# Patient Record
Sex: MALE | Race: WHITE | Employment: OTHER | URBAN - NONMETROPOLITAN AREA
[De-identification: names, ages, dates, MRNs, and addresses within clinical notes are randomized per-mention and may not be internally consistent; named-entity substitution may affect disease eponyms.]

---

## 2018-04-24 ENCOUNTER — HOSPITAL ENCOUNTER (OUTPATIENT)
Age: 83
Discharge: HOME | End: 2018-04-24
Payer: MEDICARE

## 2018-04-24 LAB
ABSOLUTE EOS #: 0.1 K/UL (ref 0–0.4)
ABSOLUTE IMMATURE GRANULOCYTE: NORMAL K/UL (ref 0–0.3)
ABSOLUTE LYMPH #: 1.5 K/UL (ref 1–4.8)
ABSOLUTE MONO #: 0.7 K/UL (ref 0–1)
ANION GAP SERPL CALCULATED.3IONS-SCNC: 13 MMOL/L (ref 9–17)
BASOPHILS # BLD: 0 % (ref 0–2)
BASOPHILS ABSOLUTE: 0 K/UL (ref 0–0.2)
BUN BLDV-MCNC: 26 MG/DL (ref 8–23)
BUN/CREAT BLD: 21 (ref 9–20)
CALCIUM SERPL-MCNC: 9.5 MG/DL (ref 8.6–10.4)
CHLORIDE BLD-SCNC: 100 MMOL/L (ref 98–107)
CO2: 30 MMOL/L (ref 20–31)
CREAT SERPL-MCNC: 1.24 MG/DL (ref 0.7–1.2)
DIFFERENTIAL TYPE: NORMAL
EOSINOPHILS RELATIVE PERCENT: 2 % (ref 0–5)
GFR AFRICAN AMERICAN: >60 ML/MIN
GFR NON-AFRICAN AMERICAN: 55 ML/MIN
GFR SERPL CREATININE-BSD FRML MDRD: ABNORMAL ML/MIN/{1.73_M2}
GFR SERPL CREATININE-BSD FRML MDRD: ABNORMAL ML/MIN/{1.73_M2}
GLUCOSE BLD-MCNC: 105 MG/DL (ref 70–99)
HCT VFR BLD CALC: 42.1 % (ref 41–53)
HEMOGLOBIN: 14 G/DL (ref 13.5–17.5)
IMMATURE GRANULOCYTES: NORMAL %
LYMPHOCYTES # BLD: 19 % (ref 13–44)
MCH RBC QN AUTO: 30.7 PG (ref 26–34)
MCHC RBC AUTO-ENTMCNC: 33.3 G/DL (ref 31–37)
MCV RBC AUTO: 92.3 FL (ref 80–100)
MONOCYTES # BLD: 9 % (ref 5–9)
NRBC AUTOMATED: NORMAL PER 100 WBC
PDW BLD-RTO: 14 % (ref 12.1–15.2)
PLATELET # BLD: 233 K/UL (ref 140–450)
PLATELET ESTIMATE: NORMAL
PMV BLD AUTO: NORMAL FL (ref 6–12)
POTASSIUM SERPL-SCNC: 3.7 MMOL/L (ref 3.7–5.3)
RBC # BLD: 4.56 M/UL (ref 4.5–5.9)
RBC # BLD: NORMAL 10*6/UL
SEG NEUTROPHILS: 70 % (ref 39–75)
SEGMENTED NEUTROPHILS ABSOLUTE COUNT: 5.6 K/UL (ref 2.1–6.5)
SODIUM BLD-SCNC: 143 MMOL/L (ref 135–144)
WBC # BLD: 8 K/UL (ref 3.5–11)
WBC # BLD: NORMAL 10*3/UL

## 2018-04-24 PROCEDURE — 36415 COLL VENOUS BLD VENIPUNCTURE: CPT

## 2018-04-24 PROCEDURE — 80048 BASIC METABOLIC PNL TOTAL CA: CPT

## 2018-04-24 PROCEDURE — 85025 COMPLETE CBC W/AUTO DIFF WBC: CPT

## 2018-06-18 ENCOUNTER — APPOINTMENT (OUTPATIENT)
Dept: GENERAL RADIOLOGY | Age: 83
End: 2018-06-18
Payer: MEDICARE

## 2018-06-18 ENCOUNTER — HOSPITAL ENCOUNTER (EMERGENCY)
Age: 83
Discharge: ANOTHER HOSPITAL | End: 2018-06-18
Attending: FAMILY MEDICINE
Payer: MEDICARE

## 2018-06-18 VITALS — DIASTOLIC BLOOD PRESSURE: 70 MMHG | OXYGEN SATURATION: 92 % | SYSTOLIC BLOOD PRESSURE: 114 MMHG

## 2018-06-18 DIAGNOSIS — L03.116 CELLULITIS OF LEFT FOOT: Primary | ICD-10-CM

## 2018-06-18 DIAGNOSIS — I99.8 ISCHEMIA OF FOOT: ICD-10-CM

## 2018-06-18 LAB
-: NORMAL
ABSOLUTE EOS #: 0.1 K/UL (ref 0–0.4)
ABSOLUTE IMMATURE GRANULOCYTE: ABNORMAL K/UL (ref 0–0.3)
ABSOLUTE LYMPH #: 0.9 K/UL (ref 1–4.8)
ABSOLUTE MONO #: 0.9 K/UL (ref 0–1)
ALBUMIN SERPL-MCNC: 3.2 G/DL (ref 3.5–5.2)
ALBUMIN/GLOBULIN RATIO: ABNORMAL (ref 1–2.5)
ALP BLD-CCNC: 85 U/L (ref 40–129)
ALT SERPL-CCNC: 18 U/L (ref 5–41)
AMORPHOUS: NORMAL
ANION GAP SERPL CALCULATED.3IONS-SCNC: 11 MMOL/L (ref 9–17)
AST SERPL-CCNC: 26 U/L
BACTERIA: NORMAL
BASOPHILS # BLD: 0 % (ref 0–2)
BASOPHILS ABSOLUTE: 0 K/UL (ref 0–0.2)
BILIRUB SERPL-MCNC: 0.34 MG/DL (ref 0.3–1.2)
BILIRUBIN DIRECT: <0.08 MG/DL
BILIRUBIN URINE: NEGATIVE
BILIRUBIN, INDIRECT: ABNORMAL MG/DL (ref 0–1)
BUN BLDV-MCNC: 51 MG/DL (ref 8–23)
BUN/CREAT BLD: 28 (ref 9–20)
CALCIUM SERPL-MCNC: 9.2 MG/DL (ref 8.6–10.4)
CASTS UA: NORMAL /LPF
CHLORIDE BLD-SCNC: 99 MMOL/L (ref 98–107)
CO2: 24 MMOL/L (ref 20–31)
COLOR: YELLOW
COMMENT UA: ABNORMAL
CREAT SERPL-MCNC: 1.79 MG/DL (ref 0.7–1.2)
CRYSTALS, UA: NORMAL /HPF
DIFFERENTIAL TYPE: YES
EKG ATRIAL RATE: 79 BPM
EKG P AXIS: 6 DEGREES
EKG P-R INTERVAL: 232 MS
EKG Q-T INTERVAL: 394 MS
EKG QRS DURATION: 58 MS
EKG QTC CALCULATION (BAZETT): 451 MS
EKG R AXIS: -33 DEGREES
EKG T AXIS: 33 DEGREES
EKG VENTRICULAR RATE: 79 BPM
EOSINOPHILS RELATIVE PERCENT: 2 % (ref 0–5)
EPITHELIAL CELLS UA: NORMAL /HPF
GFR AFRICAN AMERICAN: 44 ML/MIN
GFR NON-AFRICAN AMERICAN: 36 ML/MIN
GFR SERPL CREATININE-BSD FRML MDRD: ABNORMAL ML/MIN/{1.73_M2}
GFR SERPL CREATININE-BSD FRML MDRD: ABNORMAL ML/MIN/{1.73_M2}
GLOBULIN: ABNORMAL G/DL (ref 1.5–3.8)
GLUCOSE BLD-MCNC: 109 MG/DL (ref 70–99)
GLUCOSE URINE: NEGATIVE
HCT VFR BLD CALC: 40.1 % (ref 41–53)
HEMOGLOBIN: 13.3 G/DL (ref 13.5–17.5)
IMMATURE GRANULOCYTES: ABNORMAL %
KETONES, URINE: NEGATIVE
LACTIC ACID: 1.5 MMOL/L (ref 0.5–2.2)
LEUKOCYTE ESTERASE, URINE: ABNORMAL
LYMPHOCYTES # BLD: 12 % (ref 13–44)
MCH RBC QN AUTO: 30.2 PG (ref 26–34)
MCHC RBC AUTO-ENTMCNC: 33.2 G/DL (ref 31–37)
MCV RBC AUTO: 91.1 FL (ref 80–100)
MONOCYTES # BLD: 11 % (ref 5–9)
MUCUS: NORMAL
NITRITE, URINE: NEGATIVE
NRBC AUTOMATED: ABNORMAL PER 100 WBC
OTHER OBSERVATIONS UA: NORMAL
PDW BLD-RTO: 14 % (ref 12.1–15.2)
PH UA: 6 (ref 5–8)
PLATELET # BLD: 237 K/UL (ref 140–450)
PLATELET ESTIMATE: ABNORMAL
PMV BLD AUTO: ABNORMAL FL (ref 6–12)
POTASSIUM SERPL-SCNC: 5.3 MMOL/L (ref 3.7–5.3)
PROTEIN UA: ABNORMAL
RBC # BLD: 4.4 M/UL (ref 4.5–5.9)
RBC # BLD: ABNORMAL 10*6/UL
RBC UA: NORMAL /HPF (ref 0–2)
RENAL EPITHELIAL, UA: NORMAL /HPF
SEG NEUTROPHILS: 75 % (ref 39–75)
SEGMENTED NEUTROPHILS ABSOLUTE COUNT: 6.2 K/UL (ref 2.1–6.5)
SODIUM BLD-SCNC: 134 MMOL/L (ref 135–144)
SPECIFIC GRAVITY UA: 1.01 (ref 1–1.03)
TOTAL PROTEIN: 7.1 G/DL (ref 6.4–8.3)
TRICHOMONAS: NORMAL
TURBIDITY: CLEAR
URINE HGB: NEGATIVE
UROBILINOGEN, URINE: NORMAL
WBC # BLD: 8.2 K/UL (ref 3.5–11)
WBC # BLD: ABNORMAL 10*3/UL
WBC UA: NORMAL /HPF
YEAST: NORMAL

## 2018-06-18 PROCEDURE — 6370000000 HC RX 637 (ALT 250 FOR IP): Performed by: EMERGENCY MEDICINE

## 2018-06-18 PROCEDURE — 81001 URINALYSIS AUTO W/SCOPE: CPT

## 2018-06-18 PROCEDURE — 2580000003 HC RX 258: Performed by: FAMILY MEDICINE

## 2018-06-18 PROCEDURE — 96366 THER/PROPH/DIAG IV INF ADDON: CPT

## 2018-06-18 PROCEDURE — 80076 HEPATIC FUNCTION PANEL: CPT

## 2018-06-18 PROCEDURE — 73620 X-RAY EXAM OF FOOT: CPT

## 2018-06-18 PROCEDURE — 83605 ASSAY OF LACTIC ACID: CPT

## 2018-06-18 PROCEDURE — 96375 TX/PRO/DX INJ NEW DRUG ADDON: CPT

## 2018-06-18 PROCEDURE — 80048 BASIC METABOLIC PNL TOTAL CA: CPT

## 2018-06-18 PROCEDURE — 6360000002 HC RX W HCPCS: Performed by: FAMILY MEDICINE

## 2018-06-18 PROCEDURE — 85025 COMPLETE CBC W/AUTO DIFF WBC: CPT

## 2018-06-18 PROCEDURE — 86403 PARTICLE AGGLUT ANTBDY SCRN: CPT

## 2018-06-18 PROCEDURE — 99284 EMERGENCY DEPT VISIT MOD MDM: CPT

## 2018-06-18 PROCEDURE — 87205 SMEAR GRAM STAIN: CPT

## 2018-06-18 PROCEDURE — 96365 THER/PROPH/DIAG IV INF INIT: CPT

## 2018-06-18 PROCEDURE — 87070 CULTURE OTHR SPECIMN AEROBIC: CPT

## 2018-06-18 PROCEDURE — 87086 URINE CULTURE/COLONY COUNT: CPT

## 2018-06-18 PROCEDURE — 93005 ELECTROCARDIOGRAM TRACING: CPT

## 2018-06-18 RX ORDER — IBUPROFEN 200 MG
400 TABLET ORAL EVERY 6 HOURS PRN
COMMUNITY

## 2018-06-18 RX ORDER — HYDROMORPHONE HCL 110MG/55ML
1 PATIENT CONTROLLED ANALGESIA SYRINGE INTRAVENOUS ONCE
Status: COMPLETED | OUTPATIENT
Start: 2018-06-18 | End: 2018-06-18

## 2018-06-18 RX ORDER — IBUPROFEN 200 MG
400 TABLET ORAL ONCE
Status: COMPLETED | OUTPATIENT
Start: 2018-06-18 | End: 2018-06-18

## 2018-06-18 RX ORDER — ONDANSETRON 2 MG/ML
4 INJECTION INTRAMUSCULAR; INTRAVENOUS ONCE
Status: COMPLETED | OUTPATIENT
Start: 2018-06-18 | End: 2018-06-18

## 2018-06-18 RX ORDER — MORPHINE SULFATE 4 MG/ML
2 INJECTION, SOLUTION INTRAMUSCULAR; INTRAVENOUS ONCE
Status: COMPLETED | OUTPATIENT
Start: 2018-06-18 | End: 2018-06-18

## 2018-06-18 RX ORDER — MAGNESIUM 30 MG
30 TABLET ORAL DAILY
Status: ON HOLD | COMMUNITY
End: 2018-06-23 | Stop reason: CLARIF

## 2018-06-18 RX ADMIN — MORPHINE SULFATE 2 MG: 4 INJECTION INTRAVENOUS at 19:17

## 2018-06-18 RX ADMIN — PIPERACILLIN SODIUM,TAZOBACTAM SODIUM 3.38 G: 3; .375 INJECTION, POWDER, FOR SOLUTION INTRAVENOUS at 19:34

## 2018-06-18 RX ADMIN — IBUPROFEN 400 MG: 200 TABLET, FILM COATED ORAL at 21:47

## 2018-06-18 RX ADMIN — ONDANSETRON 4 MG: 2 INJECTION INTRAMUSCULAR; INTRAVENOUS at 19:34

## 2018-06-18 RX ADMIN — HYDROMORPHONE HYDROCHLORIDE 1 MG: 2 INJECTION INTRAMUSCULAR; INTRAVENOUS; SUBCUTANEOUS at 20:47

## 2018-06-18 ASSESSMENT — PAIN DESCRIPTION - PAIN TYPE
TYPE: ACUTE PAIN

## 2018-06-18 ASSESSMENT — PAIN SCALES - GENERAL
PAINLEVEL_OUTOF10: 8
PAINLEVEL_OUTOF10: 6
PAINLEVEL_OUTOF10: 10
PAINLEVEL_OUTOF10: 10
PAINLEVEL_OUTOF10: 8

## 2018-06-18 ASSESSMENT — PAIN DESCRIPTION - DIRECTION: RADIATING_TOWARDS: KNEE

## 2018-06-18 ASSESSMENT — PAIN DESCRIPTION - FREQUENCY
FREQUENCY: CONTINUOUS
FREQUENCY: CONTINUOUS

## 2018-06-18 ASSESSMENT — PAIN DESCRIPTION - LOCATION: LOCATION: FOOT;LEG

## 2018-06-18 ASSESSMENT — PAIN DESCRIPTION - ORIENTATION
ORIENTATION: LEFT

## 2018-06-18 ASSESSMENT — PAIN DESCRIPTION - DESCRIPTORS
DESCRIPTORS: BURNING
DESCRIPTORS: SHOOTING

## 2018-06-19 LAB
CULTURE: NORMAL
Lab: NORMAL
SPECIMEN DESCRIPTION: NORMAL
STATUS: NORMAL

## 2018-06-20 LAB
CULTURE: ABNORMAL
DIRECT EXAM: ABNORMAL
Lab: ABNORMAL
SPECIMEN DESCRIPTION: ABNORMAL
STATUS: ABNORMAL

## 2018-06-22 ENCOUNTER — HOSPITAL ENCOUNTER (INPATIENT)
Age: 83
LOS: 15 days | Discharge: HOME | DRG: 949 | End: 2018-07-07
Attending: SURGERY | Admitting: SURGERY
Payer: MEDICARE

## 2018-06-22 PROBLEM — Z98.62 S/P PERIPHERAL ARTERY ANGIOPLASTY: Status: ACTIVE | Noted: 2018-06-22

## 2018-06-22 PROBLEM — I99.8 ISCHEMIA OF EXTREMITY: Status: ACTIVE | Noted: 2018-06-22

## 2018-06-22 PROCEDURE — 6370000000 HC RX 637 (ALT 250 FOR IP): Performed by: SURGERY

## 2018-06-22 PROCEDURE — 1200000002 HC SEMI PRIVATE SWING BED

## 2018-06-22 RX ORDER — CIPROFLOXACIN 2 MG/ML
400 INJECTION, SOLUTION INTRAVENOUS EVERY 12 HOURS
Status: DISCONTINUED | OUTPATIENT
Start: 2018-06-22 | End: 2018-07-06

## 2018-06-22 RX ORDER — TAMSULOSIN HYDROCHLORIDE 0.4 MG/1
0.4 CAPSULE ORAL DAILY
Status: DISCONTINUED | OUTPATIENT
Start: 2018-06-23 | End: 2018-06-23

## 2018-06-22 RX ORDER — HYDROCODONE BITARTRATE AND ACETAMINOPHEN 5; 325 MG/1; MG/1
1 TABLET ORAL EVERY 6 HOURS PRN
Status: DISCONTINUED | OUTPATIENT
Start: 2018-06-22 | End: 2018-07-07 | Stop reason: HOSPADM

## 2018-06-22 RX ORDER — DOCUSATE SODIUM 100 MG/1
100 CAPSULE, LIQUID FILLED ORAL DAILY
Status: DISCONTINUED | OUTPATIENT
Start: 2018-06-23 | End: 2018-07-07 | Stop reason: HOSPADM

## 2018-06-22 RX ORDER — CLOPIDOGREL BISULFATE 75 MG/1
75 TABLET ORAL DAILY
Status: DISCONTINUED | OUTPATIENT
Start: 2018-06-23 | End: 2018-07-07 | Stop reason: HOSPADM

## 2018-06-22 RX ADMIN — HYDROCODONE BITARTRATE AND ACETAMINOPHEN 1 TABLET: 5; 325 TABLET ORAL at 23:15

## 2018-06-22 ASSESSMENT — PAIN DESCRIPTION - PROGRESSION: CLINICAL_PROGRESSION: NOT CHANGED

## 2018-06-22 ASSESSMENT — PAIN DESCRIPTION - ONSET: ONSET: ON-GOING

## 2018-06-22 ASSESSMENT — PAIN DESCRIPTION - FREQUENCY: FREQUENCY: INTERMITTENT

## 2018-06-22 ASSESSMENT — PAIN DESCRIPTION - DESCRIPTORS: DESCRIPTORS: BURNING;SHARP

## 2018-06-22 ASSESSMENT — PAIN SCALES - GENERAL
PAINLEVEL_OUTOF10: 5
PAINLEVEL_OUTOF10: 5

## 2018-06-22 ASSESSMENT — PAIN DESCRIPTION - ORIENTATION: ORIENTATION: LEFT;ANTERIOR;POSTERIOR

## 2018-06-22 ASSESSMENT — PAIN DESCRIPTION - LOCATION: LOCATION: LEG

## 2018-06-22 ASSESSMENT — PAIN DESCRIPTION - PAIN TYPE: TYPE: ACUTE PAIN

## 2018-06-22 NOTE — H&P
History and Physical      Santy Shearer is a 80 y.o. male  YOB: 1930      Chief complaint: left leg crisis of combined ischemia and polymicrobial  Infection . Present Illness: Patient presents with resting pain and swollen draining left lower extremity. He is sent to er here for arrangements for vascular intervention and soft tissue management. This is effected through services at [facility] vascular, infectious disease. Patient also in need of pt ot. Cultures return staph aureus and pseudomonas with multiple other gram negatives. Family History  Diabetes No  Heart Disease Yes  Tuberculosis No  Cancer No  Other:        Family History: cardiovascular diseas at an advanced age. Past History  Asthma No  Chronic Bronchitis Yes  Emphysema Yes  Tuberculosis No  Smokes Yes( pipe 70 years)  Head Injury No  Epilepsy No  Kidney Disease No  Liver Disease/Hepatitis No  Yellow Jaundice No  Diabetes No  Thyroid Disease No  Heart Disease Yes  High Blood Pressure Yes  Angina No  Rheumatic Fever No  Cortisone Rx No  Alcohol Excess No  Pregnancy No  Glaucoma No  Loose Teeth Yes and No  Tranquilizers No  Other: peripheral vascular disease. 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 Authorizing Provider   ibuprofen (ADVIL;MOTRIN) 200 MG tablet Take 400 mg by mouth every 6 hours as needed for Pain    Historical Provider, MD   magnesium 30 MG tablet Take 30 mg by mouth daily    Historical Provider, MD   POTASSIUM GLUCONATE PO Take by mouth    Historical Provider, MD   Albuterol Sulfate (PROAIR HFA IN) Inhale into the lungs 2 puffs every 6 hours as needed.     Historical Provider, MD   traMADol [place] [facility]) 50 MG tablet Take 50 mg by mouth every 6 hours as needed for Pain    Historical Provider, MD   hydrOXYzine (VISTARIL) 50 MG capsule Take 50 mg by mouth 3 times daily as needed for Itching    Historical Provider, MD   furosemide (LASIX) 20 MG tablet Take 20 mg by mouth 2 times daily     Historical Provider, MD   tamsulosin (FLOMAX) 0.4 MG capsule Take 0.4 mg by mouth 2 times daily    Historical Provider, MD   lisinopril (PRINIVIL;ZESTRIL) 5 MG tablet Take 5 mg by mouth daily    Historical Provider, MD   , No current facility-administered medications for this encounter. Allergies: Patient has no known allergies. Previous Surgery: percutaneous cath with angioplasty left leg 6/18, t and a, herniorraphy       Physical Examination    Constitutional: well nourished     Neurological: oriented times three cn 2-12 intact  Conductive hearing deficit     Eyes: perrla eom intact conj pink non icteric     Lymphatic wnl     Neck/Ear/Nose/Throat: no carotid bruits no masses slight cervical khyphosis    Respiratory: increased ap diameter chest slow exiration no rales no rhonchi     Cardiovascular: rrr     Abdomen/GI: soft nor masses no tenderness. Musculoskeletal: left lower leg swollen unaboot present     G/U female: na                         G/U male: grossly wnl male age appropriate     Chest/Breasts: negative     Skin/Integumentary: solar keratosis and elastosis *    Psychological: droll sense of humor     Other:           Conclusion: recent invasive vascular arterial procedure left leg for ischemia and polymicrobial infection left lower leg . Multiple comorbidities inclue physical deconditioning unstable gaite copd, essential hypertension cardiomegally and prostatic hypertrophy with urinary outlet obstruction. Planned course of Action: admit to swing bed status, pt ot gait stabilization wound care bacterial control and other measures to treat lungs heart and deal with urinary tract limitations.

## 2018-06-23 LAB
ABSOLUTE EOS #: 0.29 K/UL (ref 0–0.4)
ABSOLUTE IMMATURE GRANULOCYTE: ABNORMAL K/UL (ref 0–0.3)
ABSOLUTE LYMPH #: 1.15 K/UL (ref 1–4.8)
ABSOLUTE MONO #: 0.79 K/UL (ref 0–1)
ANION GAP SERPL CALCULATED.3IONS-SCNC: 9 MMOL/L (ref 9–17)
BASOPHILS # BLD: ABNORMAL % (ref 0–2)
BASOPHILS ABSOLUTE: ABNORMAL K/UL (ref 0–0.2)
BUN BLDV-MCNC: 16 MG/DL (ref 8–23)
BUN/CREAT BLD: 15 (ref 9–20)
CALCIUM SERPL-MCNC: 8.8 MG/DL (ref 8.6–10.4)
CHLORIDE BLD-SCNC: 106 MMOL/L (ref 98–107)
CO2: 24 MMOL/L (ref 20–31)
CREAT SERPL-MCNC: 1.09 MG/DL (ref 0.7–1.2)
EOSINOPHILS RELATIVE PERCENT: 4 % (ref 0–5)
GFR AFRICAN AMERICAN: >60 ML/MIN
GFR NON-AFRICAN AMERICAN: >60 ML/MIN
GFR SERPL CREATININE-BSD FRML MDRD: ABNORMAL ML/MIN/{1.73_M2}
GFR SERPL CREATININE-BSD FRML MDRD: ABNORMAL ML/MIN/{1.73_M2}
GLUCOSE BLD-MCNC: 115 MG/DL (ref 70–99)
HCT VFR BLD CALC: 34.1 % (ref 41–53)
HEMOGLOBIN: 11.4 G/DL (ref 13.5–17.5)
IMMATURE GRANULOCYTES: ABNORMAL %
LYMPHOCYTES # BLD: 16 % (ref 13–44)
MCH RBC QN AUTO: 30.5 PG (ref 26–34)
MCHC RBC AUTO-ENTMCNC: 33.4 G/DL (ref 31–37)
MCV RBC AUTO: 91.2 FL (ref 80–100)
MONOCYTES # BLD: 11 % (ref 5–9)
MORPHOLOGY: ABNORMAL
NRBC AUTOMATED: ABNORMAL PER 100 WBC
PDW BLD-RTO: 14.1 % (ref 12.1–15.2)
PLATELET # BLD: 240 K/UL (ref 140–450)
PLATELET ESTIMATE: ABNORMAL
PMV BLD AUTO: ABNORMAL FL (ref 6–12)
POTASSIUM SERPL-SCNC: 4.4 MMOL/L (ref 3.7–5.3)
RBC # BLD: 3.74 M/UL (ref 4.5–5.9)
RBC # BLD: ABNORMAL 10*6/UL
SEG NEUTROPHILS: 69 % (ref 39–75)
SEGMENTED NEUTROPHILS ABSOLUTE COUNT: 4.97 K/UL (ref 2.1–6.5)
SODIUM BLD-SCNC: 139 MMOL/L (ref 135–144)
WBC # BLD: 7.2 K/UL (ref 3.5–11)
WBC # BLD: ABNORMAL 10*3/UL

## 2018-06-23 PROCEDURE — 80048 BASIC METABOLIC PNL TOTAL CA: CPT

## 2018-06-23 PROCEDURE — 85007 BL SMEAR W/DIFF WBC COUNT: CPT

## 2018-06-23 PROCEDURE — 2580000003 HC RX 258

## 2018-06-23 PROCEDURE — 85027 COMPLETE CBC AUTOMATED: CPT

## 2018-06-23 PROCEDURE — 6370000000 HC RX 637 (ALT 250 FOR IP): Performed by: SURGERY

## 2018-06-23 PROCEDURE — 6360000002 HC RX W HCPCS: Performed by: SURGERY

## 2018-06-23 PROCEDURE — 97163 PT EVAL HIGH COMPLEX 45 MIN: CPT

## 2018-06-23 PROCEDURE — 2500000003 HC RX 250 WO HCPCS

## 2018-06-23 PROCEDURE — 36415 COLL VENOUS BLD VENIPUNCTURE: CPT

## 2018-06-23 PROCEDURE — 2580000003 HC RX 258: Performed by: SURGERY

## 2018-06-23 PROCEDURE — 1200000002 HC SEMI PRIVATE SWING BED

## 2018-06-23 RX ORDER — NEOMYCIN AND POLYMYXIN B SULFATES 40; 200000 MG/ML; [USP'U]/ML
SOLUTION IRRIGATION
Status: COMPLETED
Start: 2018-06-23 | End: 2018-06-23

## 2018-06-23 RX ORDER — BACLOFEN 20 MG
1 TABLET ORAL DAILY
Status: ON HOLD | COMMUNITY
End: 2018-10-19 | Stop reason: CLARIF

## 2018-06-23 RX ORDER — HYDROXYZINE PAMOATE 25 MG/1
25 CAPSULE ORAL 3 TIMES DAILY PRN
Status: DISCONTINUED | OUTPATIENT
Start: 2018-06-23 | End: 2018-07-07 | Stop reason: HOSPADM

## 2018-06-23 RX ORDER — SODIUM CHLORIDE 0.9 % (FLUSH) 0.9 %
10 SYRINGE (ML) INJECTION 2 TIMES DAILY
Status: DISCONTINUED | OUTPATIENT
Start: 2018-06-23 | End: 2018-07-07 | Stop reason: HOSPADM

## 2018-06-23 RX ORDER — HYDROMORPHONE HCL 110MG/55ML
0.5 PATIENT CONTROLLED ANALGESIA SYRINGE INTRAVENOUS ONCE
Status: COMPLETED | OUTPATIENT
Start: 2018-06-23 | End: 2018-06-23

## 2018-06-23 RX ORDER — SODIUM CHLORIDE 0.9 % (FLUSH) 0.9 %
SYRINGE (ML) INJECTION
Status: COMPLETED
Start: 2018-06-23 | End: 2018-06-23

## 2018-06-23 RX ORDER — SODIUM CHLORIDE 0.9 % (FLUSH) 0.9 %
10 SYRINGE (ML) INJECTION PRN
Status: DISCONTINUED | OUTPATIENT
Start: 2018-06-23 | End: 2018-07-07 | Stop reason: HOSPADM

## 2018-06-23 RX ORDER — THIAMINE HCL 100 MG
500 TABLET ORAL DAILY
Status: DISCONTINUED | OUTPATIENT
Start: 2018-06-23 | End: 2018-07-07 | Stop reason: HOSPADM

## 2018-06-23 RX ORDER — TAMSULOSIN HYDROCHLORIDE 0.4 MG/1
0.4 CAPSULE ORAL 2 TIMES DAILY
Status: DISCONTINUED | OUTPATIENT
Start: 2018-06-23 | End: 2018-07-07 | Stop reason: HOSPADM

## 2018-06-23 RX ADMIN — CIPROFLOXACIN 400 MG: 2 INJECTION, SOLUTION INTRAVENOUS at 11:10

## 2018-06-23 RX ADMIN — Medication 500 MG: at 15:29

## 2018-06-23 RX ADMIN — Medication 10 ML: at 02:18

## 2018-06-23 RX ADMIN — HYDROCODONE BITARTRATE AND ACETAMINOPHEN 1 TABLET: 5; 325 TABLET ORAL at 05:49

## 2018-06-23 RX ADMIN — CLOPIDOGREL BISULFATE 75 MG: 75 TABLET ORAL at 08:59

## 2018-06-23 RX ADMIN — TAMSULOSIN HYDROCHLORIDE 0.4 MG: 0.4 CAPSULE ORAL at 08:59

## 2018-06-23 RX ADMIN — HYDROCODONE BITARTRATE AND ACETAMINOPHEN 1 TABLET: 5; 325 TABLET ORAL at 23:10

## 2018-06-23 RX ADMIN — TAMSULOSIN HYDROCHLORIDE 0.4 MG: 0.4 CAPSULE ORAL at 23:10

## 2018-06-23 RX ADMIN — CIPROFLOXACIN 400 MG: 2 INJECTION, SOLUTION INTRAVENOUS at 00:18

## 2018-06-23 RX ADMIN — DOCUSATE SODIUM 100 MG: 100 CAPSULE, LIQUID FILLED ORAL at 08:59

## 2018-06-23 RX ADMIN — HYDROCODONE BITARTRATE AND ACETAMINOPHEN 1 TABLET: 5; 325 TABLET ORAL at 11:12

## 2018-06-23 RX ADMIN — Medication 10 ML: at 20:23

## 2018-06-23 RX ADMIN — HYDROMORPHONE HYDROCHLORIDE 0.5 MG: 2 INJECTION INTRAMUSCULAR; INTRAVENOUS; SUBCUTANEOUS at 02:18

## 2018-06-23 RX ADMIN — CIPROFLOXACIN 400 MG: 2 INJECTION, SOLUTION INTRAVENOUS at 23:10

## 2018-06-23 RX ADMIN — Medication 10 ML: at 23:10

## 2018-06-23 RX ADMIN — NEOMYCIN AND POLYMYXIN B SULFATES 1 ML: 40; 200000 IRRIGANT IRRIGATION at 02:18

## 2018-06-23 RX ADMIN — HYDROXYZINE PAMOATE 25 MG: 25 CAPSULE ORAL at 14:22

## 2018-06-23 ASSESSMENT — PAIN DESCRIPTION - PROGRESSION
CLINICAL_PROGRESSION: NOT CHANGED

## 2018-06-23 ASSESSMENT — PAIN SCALES - GENERAL
PAINLEVEL_OUTOF10: 8
PAINLEVEL_OUTOF10: 4
PAINLEVEL_OUTOF10: 0
PAINLEVEL_OUTOF10: 3

## 2018-06-23 NOTE — PROGRESS NOTES
Subjective: Patient has undergone emergency angioplasty for restoration of circulation to his left lower leg. He has considerable skin loss for a cellulitis of a polymicrobial nature in combination with ischemia. His wound care needs are complex. Objective: chest clear to ascultation slow expiration occ mucoid cough. Card rrr . Dressings taken down to skin level and photographed wounds cleansed and redressed. Tolerance excellent. Assessment: polymicrobial  Soft tissue infection left lower leg with initially ischemic left lower extremity. Plan: continue present regimen  Iv antibiotics and intensive round the clock wound care. All of the patient's questions answered.       Zainab Skaggs MD

## 2018-06-23 NOTE — PROGRESS NOTES
Wound care to left lower leg provided as ordered. Skin remains very dry, flaky with some areas of bleeding. Overall pink/brenden with vascular discoloration. Pt tolerates dressing change fairly well. Remains up in chair.

## 2018-06-23 NOTE — PROGRESS NOTES
assistance  Quality of Gait: uneven stride, favors left leg  Distance: 75 ftx2  Balance  Sitting - Static: Good  Sitting - Dynamic: Good  Standing - Static: Fair  Standing - Dynamic: Poor  Tandem Stance R Le  Tandem Stance L Le  Single Leg Stance R Le  Single Leg Stance L Le    Assessment      Body structures, Functions, Activity limitations: Decreased functional mobility , Decreased strength, Decreased balance  Chart Reviewed: Yes  Assessment: Patient transfered with min assist.  He ambulated 75 ftx 2 with w.walker to and from therapy room. He ambulated up and down 5 stair steps non-alternating feet with CGA and B handrails. He went to toilet  with min assist for transfers. He stood at sink and washed hands with CGA for balance. Patient return to bed at end of session with min assist to lift left leg into bed.   Prognosis: Good           Plan  Times per week: daily  Times per day:  (1-2 a day)  Current Treatment Recommendations: Strengthening, Balance Training, Functional Mobility Training, Transfer Training, Gait Training, Stair training, Neuromuscular Re-education    Goals  Short term goals  Time Frame for Short term goals: 1 week  Short term goal 1: Patient to have increase strength left knee extension 4+/5 to transfer sit to stand modified independent  Short term goal 2: Patient to walk 100 ftx2 with wheeled walker and SBA  Short term goal 3: Patient to have increase strength left hip flexion 4/5 to transfer sit to supine independently    Long term goals  Time Frame for Long term goals : 2 weeks  Long term goal 1: Patient to have improved dynamic balance to good in order to complete ADLs safely  Long term goal 2: Patient to walk with least restrictive device 150 ft x2 modified independent  Long term goal 3: Patient to ambulate up and down 3 stair steps with handrail independently       Time In: 10:10  Time Out: 10:40  Timed Coded Minutes: 0  Total Treatment Time: [address] [place], PT 6/23/2018

## 2018-06-23 NOTE — PROGRESS NOTES
Up to room 270 per cart and EMS. Ambulates to BR with wheeled walker and assist of one. A & O. Denies pain at this time.

## 2018-06-23 NOTE — PROGRESS NOTES
Subjective: Patient reports leg feels much better . He complains of intense itching in his foot and ankle. He has taken vistaril for this in the past and asks for it now. Objective: chest slow expiration clear to ascultation no rales no rhonchi card rrr abdomen soft. Bowels have moved. Dressings left lower leg and foot removed to skin level. Leg cleansed with peroxide and much dead skin is debrided bluntly. Leg cleansed dried and new medicated dampened dressings applied and secured with kerlex  Skin of toes display warm and good capillary filling. Oedema continues to recede. Assessment: polymicrobial cellulitis left lower leg and foot circumferentially, acute arterial ischemia relieved through emergency angioplasty. Multiple co morbidities. Plan: continue present regimen of care. All of the patient's questions answered.   Simone Martin MD

## 2018-06-23 NOTE — PROGRESS NOTES
SWING BED PROGRAM PRE-ADMISSION ASSESSMENT  (TRADITIONAL MEDICARE PATIENTS)  Patient Name: Sukumar Del Real      : 1930  (80 y.o.) Gender: male   Room: [address] [place] [address] MRN: [id number]      Inpatient Admission Date: 2018 Date & Time of Referral: 2018     Referred By: Jewelene Osler, MD Referred from:  [] [facility]    [x] Other: [address]     # of Skilled Care Days Used in Last 60 days: 0 Insurance: []  Medicare                      []  Secondary  Type:     Present Condition/Diagnosis:    arterial vascular insuff  left foot pain      Previous Medical History:   Past Medical History:   Diagnosis Date    Arthritis     Dependent edema     Hypertension     Psoriasis         ADL Performance Last Seven (7) Days (Please Score)   Patients performance over all shifts during last seven (7) days     0 = Independent  No help or oversight  1 = Supervision  Oversight, encouragement or cueing provided  2 = Limited Assist -Highly involved in activity; assistance in guided maneuvering of limbs or other non-weight-bearing assistance  3 = Extensive Assist  Receives physical help in guided maneuvering of limbs or other non-weight bearing assistance  4 = Total Dependence  Full staff performance of activity   7 = Activity occurred only once or twice  8 = Activity did not occur - Activity did not occur or family and/or non- facility staff provided care 100% of the time for that activity over a 7-day period. SCORE   BED MOBILITY  How client moves to and from lying position, turns side to side, and positions body while in bed 3   TRANSFER  How resident moves between surfaces  to/from: bed, chair, wheelchair, standing position (EXCLUDE to/from bath/toilet) 3   TOILET USE  How client uses the toilet room (or commode, bedpan, urinal);transfers on/off toilet, cleanses, changes pad, manages ostomy or catheter, adjusts clothes 3   EATING  How client eats and drinks (regardless of skill).  Includes intake of nourishment by other means (e.g., tube feeding, total parenteral nutrition) 0   Estimated Pre-Admission ADL Value: 9     PATIENT WILL RECEIVE THE FOLLOWING SKILLED SERVICES AS A SWING BED PATIENT:       REHABILITATION (PT/OT/SP)    [] ULTRA HIGH 720 or more minutes minimum per week of at least two (2) disciplines  1st for at least five (5) days and 2nd for at least three (3) days   [] VERY HIGH 500 or more minutes minimum per week of at least one (1) discipline for at least five (5) days   [] HIGH 325 or more minutes minimum per week of at least one (1) discipline for at least five (5) days   [] MEDIUM 150 or more minutes minimum per week at least five (5) days of any combination with three (3) therapies   [] LOW Restorative nursing at least six (6) days, two (2) activities, or therapies for at least three (3) days at least forty-five (45) minute per week minimum services. EXTENSIVE SERVICES   [] Tracheostomy Care   [] Ventilator/Respirator   [] Infection Isolation   SPECIAL CARE HIGH   [] MS with ADL greater than or equal to 10  [] Quadriplegic with ADL greater than or equal to 5  [] emphysema/COPD and shortness of breath when lying flat  [] Fever w/at least one (1) of the following: [] dehydration [] pneumonia [] vomiting [] weight loss  [] feeding tube  [] Septicemia  [] Coma (not awake & completely dependent in ADL)  [] Diabetes and injections seven (7) days and Dr. order change two (2) or more days.   [] Parenteral/IV feeding  [] respiratory therapy for seven (7) days   SPECIAL CARE LOW:   [] Respiratory Therapy  [] Ulcers (2+sites all stages) w/treatment  [] Multiple Sclerosis  [] Cerebral Palsy  [] Parkinsons Disease  [] Oxygen Therapy  [] Extensive care services w/ADL less than 6  [] Fever w/at least one (1) of the following: [] dehydration [] pneumonia [] vomiting [] weight loss  [] Foot Infection or open lesions on the foot with treatment  [] tube-feeding  calories greater than or equal to 51% or tube feeding with total calories greater than or equal to 26% and fluid parental or enteral intake of greater than or equal to 50 ml per day   CLINICALLY COMPLEX   CURRENTLY:  [] Pneumonia  [] Hemiplegics with ADL with ADL Sum greater than or equal to 10  [] IV medications delivered post admission in the SNF  [x] Surgical wounds or open lesions w/treatment      EVALUATORS SIGNATURE:Brenda Sherman DATE: 6/22/2018       [x] ACCEPTED FOR ADMISSION ON:  06/22/2018                              ELOS:  [x] 1-2wks  [] 2-3wks  [] 3-4wks   [] ADMISSION DENIED BASED ON:    [] [address] [facility] Jose COORDINATOR

## 2018-06-24 PROCEDURE — 97110 THERAPEUTIC EXERCISES: CPT

## 2018-06-24 PROCEDURE — 2580000003 HC RX 258: Performed by: SURGERY

## 2018-06-24 PROCEDURE — 97116 GAIT TRAINING THERAPY: CPT

## 2018-06-24 PROCEDURE — 6370000000 HC RX 637 (ALT 250 FOR IP): Performed by: SURGERY

## 2018-06-24 PROCEDURE — 1200000002 HC SEMI PRIVATE SWING BED

## 2018-06-24 PROCEDURE — 6360000002 HC RX W HCPCS: Performed by: SURGERY

## 2018-06-24 RX ADMIN — Medication 10 ML: at 20:30

## 2018-06-24 RX ADMIN — DOCUSATE SODIUM 100 MG: 100 CAPSULE, LIQUID FILLED ORAL at 09:00

## 2018-06-24 RX ADMIN — HYDROXYZINE PAMOATE 25 MG: 25 CAPSULE ORAL at 09:00

## 2018-06-24 RX ADMIN — Medication 500 MG: at 09:01

## 2018-06-24 RX ADMIN — TAMSULOSIN HYDROCHLORIDE 0.4 MG: 0.4 CAPSULE ORAL at 20:30

## 2018-06-24 RX ADMIN — CLOPIDOGREL BISULFATE 75 MG: 75 TABLET ORAL at 09:00

## 2018-06-24 RX ADMIN — CIPROFLOXACIN 400 MG: 2 INJECTION, SOLUTION INTRAVENOUS at 11:12

## 2018-06-24 RX ADMIN — Medication 10 ML: at 11:12

## 2018-06-24 RX ADMIN — HYDROCODONE BITARTRATE AND ACETAMINOPHEN 1 TABLET: 5; 325 TABLET ORAL at 15:07

## 2018-06-24 RX ADMIN — Medication 10 ML: at 09:00

## 2018-06-24 RX ADMIN — TAMSULOSIN HYDROCHLORIDE 0.4 MG: 0.4 CAPSULE ORAL at 09:00

## 2018-06-24 RX ADMIN — Medication 10 ML: at 23:29

## 2018-06-24 RX ADMIN — CIPROFLOXACIN 400 MG: 2 INJECTION, SOLUTION INTRAVENOUS at 23:29

## 2018-06-24 RX ADMIN — HYDROCODONE BITARTRATE AND ACETAMINOPHEN 1 TABLET: 5; 325 TABLET ORAL at 09:00

## 2018-06-24 RX ADMIN — HYDROCODONE BITARTRATE AND ACETAMINOPHEN 1 TABLET: 5; 325 TABLET ORAL at 23:29

## 2018-06-24 ASSESSMENT — PAIN DESCRIPTION - PROGRESSION
CLINICAL_PROGRESSION: GRADUALLY IMPROVING

## 2018-06-24 ASSESSMENT — PAIN DESCRIPTION - PAIN TYPE
TYPE: ACUTE PAIN
TYPE: ACUTE PAIN

## 2018-06-24 ASSESSMENT — PAIN SCALES - GENERAL
PAINLEVEL_OUTOF10: 0
PAINLEVEL_OUTOF10: 2
PAINLEVEL_OUTOF10: 4
PAINLEVEL_OUTOF10: 0
PAINLEVEL_OUTOF10: 4
PAINLEVEL_OUTOF10: 6
PAINLEVEL_OUTOF10: 0

## 2018-06-24 ASSESSMENT — PAIN DESCRIPTION - ORIENTATION: ORIENTATION: LEFT

## 2018-06-24 ASSESSMENT — PAIN DESCRIPTION - ONSET: ONSET: ON-GOING

## 2018-06-24 ASSESSMENT — PAIN DESCRIPTION - LOCATION
LOCATION: LEG
LOCATION: LEG

## 2018-06-24 NOTE — PROGRESS NOTES
Subjective: Patient's daughter and son in law are at the bedside. Patient reports he is doing ok so far. The have questions all of which are answered. Objective: chest slow expiration no rales no rhonchi card rrr abdomen soft he says he does not feel full and is emptying ok. Toes left foot warm with rapid capillary fill. Debridement continues  With each dressing change. Oedema less. Patient denies rest pain in the leg. Assessment: polymicrobial infection soft tissues left leg during arertial ischemia due to stenosis of supf. Fem art and trifurcation in popliteal fossa. Plan: continue present regimen. Patient responding . All of their questions answered.       Simone Martin MD

## 2018-06-24 NOTE — PROGRESS NOTES
Wound care to LLE completed as ordered. Remains with dry flaky skin but less than yesterday. Few areas of bleeding but less than yesterday. Area on upper outer shin remains tender.

## 2018-06-24 NOTE — PROGRESS NOTES
Dressing change completed as ordered; pt tolerated well. Denies further needs at present. Call light and bedside table within reach.

## 2018-06-25 PROBLEM — E44.1 MILD MALNUTRITION (HCC): Status: ACTIVE | Noted: 2018-06-25

## 2018-06-25 PROCEDURE — 6370000000 HC RX 637 (ALT 250 FOR IP): Performed by: SURGERY

## 2018-06-25 PROCEDURE — 1200000002 HC SEMI PRIVATE SWING BED

## 2018-06-25 PROCEDURE — 94761 N-INVAS EAR/PLS OXIMETRY MLT: CPT

## 2018-06-25 PROCEDURE — 6360000002 HC RX W HCPCS: Performed by: SURGERY

## 2018-06-25 PROCEDURE — 2580000003 HC RX 258: Performed by: SURGERY

## 2018-06-25 PROCEDURE — 97530 THERAPEUTIC ACTIVITIES: CPT

## 2018-06-25 PROCEDURE — 97110 THERAPEUTIC EXERCISES: CPT

## 2018-06-25 PROCEDURE — 97116 GAIT TRAINING THERAPY: CPT

## 2018-06-25 PROCEDURE — 2500000003 HC RX 250 WO HCPCS: Performed by: SURGERY

## 2018-06-25 PROCEDURE — 97166 OT EVAL MOD COMPLEX 45 MIN: CPT

## 2018-06-25 RX ORDER — NEOMYCIN AND POLYMYXIN B SULFATES 40; 200000 MG/ML; [USP'U]/ML
SOLUTION IRRIGATION EVERY 6 HOURS SCHEDULED
Status: DISCONTINUED | OUTPATIENT
Start: 2018-06-25 | End: 2018-06-25 | Stop reason: SDUPTHER

## 2018-06-25 RX ADMIN — CIPROFLOXACIN 400 MG: 2 INJECTION, SOLUTION INTRAVENOUS at 22:51

## 2018-06-25 RX ADMIN — Medication 10 ML: at 00:37

## 2018-06-25 RX ADMIN — NEOMYCIN AND POLYMYXIN B SULFATES: 40; 200000 IRRIGANT IRRIGATION at 13:00

## 2018-06-25 RX ADMIN — NEOMYCIN AND POLYMYXIN B SULFATES: 40; 200000 IRRIGANT IRRIGATION at 19:00

## 2018-06-25 RX ADMIN — CIPROFLOXACIN 400 MG: 2 INJECTION, SOLUTION INTRAVENOUS at 11:29

## 2018-06-25 RX ADMIN — DOCUSATE SODIUM 100 MG: 100 CAPSULE, LIQUID FILLED ORAL at 10:04

## 2018-06-25 RX ADMIN — Medication 10 ML: at 10:04

## 2018-06-25 RX ADMIN — TAMSULOSIN HYDROCHLORIDE 0.4 MG: 0.4 CAPSULE ORAL at 21:25

## 2018-06-25 RX ADMIN — CLOPIDOGREL BISULFATE 75 MG: 75 TABLET ORAL at 10:04

## 2018-06-25 RX ADMIN — HYDROCODONE BITARTRATE AND ACETAMINOPHEN 1 TABLET: 5; 325 TABLET ORAL at 11:29

## 2018-06-25 RX ADMIN — Medication 500 MG: at 14:57

## 2018-06-25 RX ADMIN — TAMSULOSIN HYDROCHLORIDE 0.4 MG: 0.4 CAPSULE ORAL at 10:04

## 2018-06-25 RX ADMIN — HYDROCODONE BITARTRATE AND ACETAMINOPHEN 1 TABLET: 5; 325 TABLET ORAL at 22:51

## 2018-06-25 RX ADMIN — Medication 10 ML: at 21:24

## 2018-06-25 RX ADMIN — HYDROXYZINE PAMOATE 25 MG: 25 CAPSULE ORAL at 14:57

## 2018-06-25 ASSESSMENT — PAIN SCALES - GENERAL
PAINLEVEL_OUTOF10: 0
PAINLEVEL_OUTOF10: 0
PAINLEVEL_OUTOF10: 6
PAINLEVEL_OUTOF10: 6
PAINLEVEL_OUTOF10: 0

## 2018-06-25 ASSESSMENT — PAIN DESCRIPTION - PROGRESSION

## 2018-06-25 NOTE — PROGRESS NOTES
Phone: [place]  Date: 2018  Fax: [phone]      Physical Therapy    Daily Note    Patient Name: Dominique Briseno      : 1930  (80 y.o.)  MRN: [id number]     [x] Patient requires additional Physical Therapy    [] Anticipate Physical Therapy Discharge Soon     Assessment        Supine to Sit: Supervision    Sit to Stand: Stand by assistance  Stand to sit: Stand by assistance  Bed to Chair: Minimal assistance             Ambulation 1  Surface: level tile  Device: Rolling Walker  Assistance: Contact guard assistance, Stand by assistance  Quality of Gait: uneven stride, favors left leg  Distance: 75 ft x1        Stairs  # Steps : 2 (x2 laps)  Stairs Height: 6\"  Rails: Bilateral  Assistance: Contact guard assistance, Stand by assistance    Assessment: Patient in bed upon entry to room. Supine to sit is supervision. Ambulates with wheele walker to therapy room. Completes exercise per doc flowsheet. Able to stand and complete activity for ~10 minutes. No LOB noted during standing activity. Remains in therapy room following to work with ANISH.              Time In:1454  Time Out: 1526  Timed Coded Minutes: 32  Total Treatment Time:32    Exercises:  See Flowsheets    Plan  Cont Per Plan Of Care    Goals  Short Term Goals  Time Frame for Short term goals: 1 week  Short term goal 1: Patient to have increase strength left knee extension 4+/5 to transfer sit to stand modified independent  Short term goal 2: Patient to walk 100 ftx2 with wheeled walker and SBA  Short term goal 3: Patient to have increase strength left hip flexion 4/5 to transfer sit to supine independently          Long Term Goals  Time Frame for Long term goals : 2 weeks  Long term goal 1: Patient to have improved dynamic balance to good in order to complete ADLs safely  Long term goal 2: Patient to walk with least restrictive device 150 ft x2 modified independent  Long term goal 3: Patient to ambulate up and down 3 stair

## 2018-06-25 NOTE — PROGRESS NOTES
Phone: [place]  Date: 2018  Fax: [phone]      Physical Therapy    Daily Note    Patient Name: Isabell Jesus      : 1930  (80 y.o.)  MRN: [id number]     [x] Patient requires additional Physical Therapy    [] Anticipate Physical Therapy Discharge Soon     Assessment           Sit to Supine: Minimal assistance  Scooting: Minimal assistance    Sit to Stand: Minimal Assistance, Contact guard assistance  Stand to sit: Minimal Assistance, Contact guard assistance             Ambulation 1  Surface: level tile  Device: Rolling Walker  Assistance: Contact guard assistance  Quality of Gait: uneven stride, favors left leg  Distance: 75 ftx2        Stairs  # Steps : 2 (x2 laps)  Stairs Height: 6\"  Rails: Bilateral  Assistance: Contact guard assistance, Stand by assistance    Assessment: Patient in chair upon arrival to room. Sit to stand from chair is SBA/CGA. Ambulates with wheeled walker into bathroom. On/off commode with SBA/CGA. Requires some assistance to make sure his bottom is clean. Good balance to wash his hands  He is then able to ambulate to therapy room. No LOB noted with gait. Completes exercise per doc flowsheet. Up/down 6\" steps with  B handrails and CGA/SBA. When finished, he is able to return to room to lie down. Sit to supine is mod I.  Nurse in room to work with patient. Call light in reach.      Safety Devices  Type of devices: Call light within reach, Gait belt, Left in bed, Nurse notified          Time In:   Time Out: 1131  Timed Coded Minutes:32  Total Treatment Time: 32    Exercises:  See Flowsheets    Plan  Cont Per Plan Of Care    Goals  Short Term Goals  Time Frame for Short term goals: 1 week  Short term goal 1: Patient to have increase strength left knee extension 4+/5 to transfer sit to stand modified independent  Short term goal 2: Patient to walk 100 ftx2 with wheeled walker and SBA  Short term goal 3: Patient to have increase strength left

## 2018-06-25 NOTE — PROGRESS NOTES
Dressing change completed to LLE as ordered, pt tolerated well. Pt medicated with PRN Norco 1 tab as ordered; see eMAR as ordered. Pt takes sips of water and denies any discomfort in his bladder, relates he is voiding without issue. Denies further needs, call light and bedside table within reach.

## 2018-06-25 NOTE — PROGRESS NOTES
[facility]  [facility]  Date: 2018  Patient Name: Del Vieira        MRN: [id number]    : 1930  (80 y.o.)  Gender: male   Referring Practitioner: Dr. Kera Maldonado   Diagnosis: cellulitis L foot, arterial stenosis s/p angioplasty   Additional Pertinent Hx: Patient is admitted on 18 to swingbed program from [facility] due to cellulitis with polymicrobial infection left lower leg with critical arterial stenosis sp angioplasty.     Past Medical History:   Diagnosis Date    Arthritis     Dependent edema     Hypertension     Psoriasis      Past Surgical History:   Procedure Laterality Date    HERNIA REPAIR      TONSILLECTOMY         Restrictions/Precautions: Fall Risk        Subjective  Subjective: Patient is seated in bedside chair upon arrival.   Pain Level: 6  Pain Location: Leg  Pain Orientation: Left  Orientation  Overall Orientation Status: Within Normal Limits  Vision  Vision: Within Functional Limits  Hearing  Hearing: Within functional limits  Social/Functional History  Lives With: Daughter  Type of Home: House  Home Layout: One level  Home Access: Stairs to enter with rails  Entrance Stairs - Number of Steps: 3  Entrance Stairs - Rails: Left  Bathroom Shower/Tub: Tub/Shower unit  Bathroom Toilet: Standard  Bathroom Equipment: Toilet raiser  ADL Assistance: Independent  Homemaking Assistance: Independent  Homemaking Responsibilities: Yes  Meal Prep Responsibility: Primary (states he does his own cooking)  Laundry Responsibility: Primary  Cleaning Responsibility: Primary (states he does most of the cooking at home )  Ambulation Assistance: Independent (no device)  Transfer Assistance: Independent  Active : No  Prior Function  Lives With: Daughter  ADL Assistance: Independent  Homemaking Assistance: Independent  Ambulation Assistance: Independent (no device)  Transfer Assistance: Independent    Objective                     Gross LUE Strength: Mehul White RUE Strength: WFL  ADL  Feeding: Independent  Grooming: Independent  UE Bathing: Setup  LE Bathing: Minimal assistance  UE Dressing: Setup  LE Dressing: Minimal assistance  Toileting: Contact guard assistance           Balance  Sitting Balance: Independent  Standing Balance: Contact guard assistance   Standing Balance  Sit to stand: Minimal assistance  Stand to sit: Minimal assistance             Assessment: Patient is seated in bedside chair upon arrival.  Demonstrated good balance with ambulation. Noted mild SOB during evaluation with all activity. Patient states he is more fatigued than usual at this time. Reported mild pain in foot with ambulation. Patient states nursing has been assisting with bathing and dressing since entering the hospital.  Will progres with ADL and IADL activities while patient is in the swingbed program.      Goals  Short term goals  Time Frame for Short term goals: 1 week (7-2-18)  Short term goal 1: Patient to tolerate static standing x 5 minutes in order to complete self care activities. Short term goal 2: Patient to complete LB bathing and dressing with set up only   Short term goal 3: Patient to complete toileting task and hygiene with supervision. Long term goals  Time Frame for Long term goals : 2 weeks (7-9-18)  Long term goal 1: Patient to complete UB/LB bathing independently. Long term goal 2: Patient to complete UB/LB dressing independently. Long term goal 3: Patient to tolerate static stnading x7 minutes in order to complete laundry tasks. Long term goal 4: Patient to complete all toileting tasks and hygiene independently. Long term goal 5: Patient to demonstrate independence with simple meal prep activity in order to return home independently.      Plan       Times per week: 5x  Times per day: Daily (1-2x day)            Time In: 1020  Time Out: 1045  Timed Coded Minutes: 0  Total Treatment Time: [place] 21, OTR/L  6/25/2018

## 2018-06-25 NOTE — PROGRESS NOTES
Dressing on left leg changed. Several spots of bleeding noted after removing old dressing. Leg irrigated with H2O2 then pressure applied to multiple areas of bleeding. Xerofoam dressing cut and applied to all bleeding areas.  dampened gauze applied to leg, then wrapped in kerlix and net stocking. Pt has some pain with dressing change.

## 2018-06-25 NOTE — PROGRESS NOTES
Long term goal 4: Patient to complete all toileting tasks and hygiene independently. Long term goal 5: Patient to demonstrate independence with simple meal prep activity in order to return home independently.          Time In: 1526  Time Out: 1558  Timed Coded Minutes: 32  Total Treatment Time: [place], CAT/L    Date: 6/25/2018

## 2018-06-26 PROCEDURE — 97110 THERAPEUTIC EXERCISES: CPT

## 2018-06-26 PROCEDURE — 6360000002 HC RX W HCPCS: Performed by: SURGERY

## 2018-06-26 PROCEDURE — 2500000003 HC RX 250 WO HCPCS: Performed by: SURGERY

## 2018-06-26 PROCEDURE — 97116 GAIT TRAINING THERAPY: CPT

## 2018-06-26 PROCEDURE — 94761 N-INVAS EAR/PLS OXIMETRY MLT: CPT

## 2018-06-26 PROCEDURE — 97530 THERAPEUTIC ACTIVITIES: CPT

## 2018-06-26 PROCEDURE — 1200000002 HC SEMI PRIVATE SWING BED

## 2018-06-26 PROCEDURE — 6370000000 HC RX 637 (ALT 250 FOR IP): Performed by: SURGERY

## 2018-06-26 PROCEDURE — 2580000003 HC RX 258: Performed by: SURGERY

## 2018-06-26 RX ORDER — FUROSEMIDE 20 MG/1
20 TABLET ORAL ONCE
Status: COMPLETED | OUTPATIENT
Start: 2018-06-26 | End: 2018-06-26

## 2018-06-26 RX ADMIN — HYDROCODONE BITARTRATE AND ACETAMINOPHEN 1 TABLET: 5; 325 TABLET ORAL at 11:15

## 2018-06-26 RX ADMIN — HYDROCODONE BITARTRATE AND ACETAMINOPHEN 1 TABLET: 5; 325 TABLET ORAL at 05:11

## 2018-06-26 RX ADMIN — CIPROFLOXACIN 400 MG: 2 INJECTION, SOLUTION INTRAVENOUS at 22:15

## 2018-06-26 RX ADMIN — Medication 10 ML: at 11:09

## 2018-06-26 RX ADMIN — Medication 500 MG: at 09:21

## 2018-06-26 RX ADMIN — HYDROXYZINE PAMOATE 25 MG: 25 CAPSULE ORAL at 05:11

## 2018-06-26 RX ADMIN — NEOMYCIN AND POLYMYXIN B SULFATES: 40; 200000 IRRIGANT IRRIGATION at 18:13

## 2018-06-26 RX ADMIN — FUROSEMIDE 20 MG: 20 TABLET ORAL at 12:27

## 2018-06-26 RX ADMIN — DOCUSATE SODIUM 100 MG: 100 CAPSULE, LIQUID FILLED ORAL at 09:21

## 2018-06-26 RX ADMIN — TAMSULOSIN HYDROCHLORIDE 0.4 MG: 0.4 CAPSULE ORAL at 09:21

## 2018-06-26 RX ADMIN — NEOMYCIN AND POLYMYXIN B SULFATES: 40; 200000 IRRIGANT IRRIGATION at 06:20

## 2018-06-26 RX ADMIN — HYDROXYZINE PAMOATE 25 MG: 25 CAPSULE ORAL at 18:13

## 2018-06-26 RX ADMIN — NEOMYCIN AND POLYMYXIN B SULFATES: 40; 200000 IRRIGANT IRRIGATION at 11:18

## 2018-06-26 RX ADMIN — NEOMYCIN AND POLYMYXIN B SULFATES: 40; 200000 IRRIGANT IRRIGATION at 00:10

## 2018-06-26 RX ADMIN — Medication 10 ML: at 22:15

## 2018-06-26 RX ADMIN — TAMSULOSIN HYDROCHLORIDE 0.4 MG: 0.4 CAPSULE ORAL at 20:49

## 2018-06-26 RX ADMIN — HYDROCODONE BITARTRATE AND ACETAMINOPHEN 1 TABLET: 5; 325 TABLET ORAL at 18:13

## 2018-06-26 RX ADMIN — Medication 10 ML: at 09:21

## 2018-06-26 RX ADMIN — CLOPIDOGREL BISULFATE 75 MG: 75 TABLET ORAL at 09:21

## 2018-06-26 RX ADMIN — CIPROFLOXACIN 400 MG: 2 INJECTION, SOLUTION INTRAVENOUS at 11:08

## 2018-06-26 ASSESSMENT — PAIN DESCRIPTION - PROGRESSION
CLINICAL_PROGRESSION: GRADUALLY IMPROVING
CLINICAL_PROGRESSION: NOT CHANGED
CLINICAL_PROGRESSION: GRADUALLY IMPROVING
CLINICAL_PROGRESSION: GRADUALLY IMPROVING

## 2018-06-26 ASSESSMENT — PAIN SCALES - GENERAL
PAINLEVEL_OUTOF10: 0
PAINLEVEL_OUTOF10: 4
PAINLEVEL_OUTOF10: 6
PAINLEVEL_OUTOF10: 4
PAINLEVEL_OUTOF10: 3
PAINLEVEL_OUTOF10: 4
PAINLEVEL_OUTOF10: 6
PAINLEVEL_OUTOF10: 3

## 2018-06-26 ASSESSMENT — PAIN DESCRIPTION - ONSET
ONSET: ON-GOING
ONSET: ON-GOING

## 2018-06-26 ASSESSMENT — PAIN DESCRIPTION - ORIENTATION
ORIENTATION: LEFT
ORIENTATION: LEFT

## 2018-06-26 ASSESSMENT — PAIN DESCRIPTION - FREQUENCY
FREQUENCY: INTERMITTENT
FREQUENCY: INTERMITTENT

## 2018-06-26 ASSESSMENT — PAIN DESCRIPTION - LOCATION
LOCATION: LEG
LOCATION: LEG

## 2018-06-26 ASSESSMENT — PAIN DESCRIPTION - DESCRIPTORS
DESCRIPTORS: ITCHING
DESCRIPTORS: BURNING

## 2018-06-26 ASSESSMENT — PAIN DESCRIPTION - PAIN TYPE
TYPE: ACUTE PAIN
TYPE: ACUTE PAIN

## 2018-06-26 NOTE — PROGRESS NOTES
[facility]  Occupational Therapy  Daily Note  Date: 2018  Patient Name: Santy Shearer        MRN: [id number]    : 1930  (80 y.o.)    Subjective: In bed upon arrival    Objective  ADL     Feeding: Independent  Grooming: Independent  UE Bathing: Setup  LE Bathing: Minimal assistance  UE Dressing: Setup  LE Dressing: Minimal assistance  Toileting: Contact guard assistance  Toilet Transfer: Contact guard assistance           Sit to Supine: Contact guard assistance     Sit to stand: Contact guard assistance  Stand to sit: Contact guard assistance                     Balance  Sitting Balance: Independent  Standing Balance: Contact guard assistance  Standing Balance  Time: ~10 min  Activity: UE task, ring tree  Sit to stand: Contact guard assistance  Stand to sit: Contact guard assistance    Assessment  Assessment: Pt in bed upon arrival. Pt & RN state pt has already completed ADL session this am. Pt agreeable to working w/ OTR in therapy room. Completes bed moblity Mod I and ambulates to therapy room w/ FWW & CGA. Completes REJI UB exercises to increase UB strength & endurance, good tolerance noted as pt required less rest breaks and no SOB. Concluded w/ kitchen item retrieval task, requires min verbal reminders to use walker properly when navigating kitchen area. Returns to room w/ FWW & CGA and returns to supine in bed. Call light in reach and all needs met. Prognosis: Good  Discharge Recommendations: Continue to assess pending progress                Exercises:  See Flowsheets    Goals  Short Term Goals  Time Frame for Short term goals: 1 week (7218)  Short term goal 1: Patient to tolerate static standing x 5 minutes in order to complete self care activities. Short term goal 2: Patient to complete LB bathing and dressing with set up only   Short term goal 3: Patient to complete toileting task and hygiene with supervision.          Long Term Goals  Time Frame for Long term goals : 2 weeks (7-9-18)  Long term goal 1: Patient to complete UB/LB bathing independently. Long term goal 2: Patient to complete UB/LB dressing independently. Long term goal 3: Patient to tolerate static stnading x7 minutes in order to complete laundry tasks. Long term goal 4: Patient to complete all toileting tasks and hygiene independently. Long term goal 5: Patient to demonstrate independence with simple meal prep activity in order to return home independently.      Timed Code Treatment Minutes: 33 Minutes     Time In: 1020  Time Out: 0976  Timed Coded Minutes: 33  Total Treatment Time: [address]   MOT OTR/L  Date: 6/26/2018

## 2018-06-26 NOTE — PROGRESS NOTES
Phone: [place]  Date: 2018  Fax: [phone]      Physical Therapy    Daily Note    Patient Name: Luisito Elaine      : 1930  (80 y.o.)  MRN: [id number]     [x] Patient requires additional Physical Therapy    [] Anticipate Physical Therapy Discharge Soon     Assessment        Sit to Supine: Minimal assistance    Sit to Stand: Stand by assistance  Stand to sit: Stand by assistance  Bed to Chair: Minimal assistance             Ambulation 1  Surface: level tile  Device: Rolling Walker  Assistance: Stand by assistance, Supervision  Quality of Gait: uneven stride, favors left leg  Distance: 75 ftx2        Stairs  # Steps : 5 (x2 laps)  Stairs Height: 6\" (4\")  Rails: Bilateral  Assistance: Contact guard assistance, Stand by assistance    Assessment: Patient in bathroom upon arrival finishing with his bath. Requests need from PTA to wash his back. When finished with this, he ambulates with wheeled walker to therapy room. No LOB noted with gait. Completes exercise per do flowsheet. Up/down steps with B handrails and SBA. Returns to room following to lie down. Sit to supine is supervision/mod I. Call light in reach.      Safety Devices  Type of devices: Call light within reach, Gait belt, Left in bed          Time In: 0935  Time Out: 1010  Timed Coded Minutes:35  Total Treatment Time:35    Exercises:  See Flowsheets    Plan  Cont Per Plan Of Care    Goals  Short Term Goals  Time Frame for Short term goals: 1 week  Short term goal 1: Patient to have increase strength left knee extension 4+/5 to transfer sit to stand modified independent  Short term goal 2: Patient to walk 100 ftx2 with wheeled walker and SBA  Short term goal 3: Patient to have increase strength left hip flexion 4/5 to transfer sit to supine independently          Long Term Goals  Time Frame for Long term goals : 2 weeks  Long term goal 1: Patient to have improved dynamic balance to good in order to complete ADLs

## 2018-06-27 PROCEDURE — 1200000002 HC SEMI PRIVATE SWING BED

## 2018-06-27 PROCEDURE — 2580000003 HC RX 258: Performed by: SURGERY

## 2018-06-27 PROCEDURE — 97535 SELF CARE MNGMENT TRAINING: CPT

## 2018-06-27 PROCEDURE — 6360000002 HC RX W HCPCS: Performed by: SURGERY

## 2018-06-27 PROCEDURE — 97116 GAIT TRAINING THERAPY: CPT

## 2018-06-27 PROCEDURE — 6370000000 HC RX 637 (ALT 250 FOR IP): Performed by: SURGERY

## 2018-06-27 PROCEDURE — 2500000003 HC RX 250 WO HCPCS: Performed by: SURGERY

## 2018-06-27 PROCEDURE — 51798 US URINE CAPACITY MEASURE: CPT

## 2018-06-27 PROCEDURE — 97530 THERAPEUTIC ACTIVITIES: CPT

## 2018-06-27 PROCEDURE — 97110 THERAPEUTIC EXERCISES: CPT

## 2018-06-27 RX ADMIN — HYDROXYZINE PAMOATE 25 MG: 25 CAPSULE ORAL at 00:57

## 2018-06-27 RX ADMIN — TAMSULOSIN HYDROCHLORIDE 0.4 MG: 0.4 CAPSULE ORAL at 22:30

## 2018-06-27 RX ADMIN — CLOPIDOGREL BISULFATE 75 MG: 75 TABLET ORAL at 07:58

## 2018-06-27 RX ADMIN — NEOMYCIN AND POLYMYXIN B SULFATES: 40; 200000 IRRIGANT IRRIGATION at 18:24

## 2018-06-27 RX ADMIN — Medication 10 ML: at 22:30

## 2018-06-27 RX ADMIN — Medication 500 MG: at 11:08

## 2018-06-27 RX ADMIN — HYDROCODONE BITARTRATE AND ACETAMINOPHEN 1 TABLET: 5; 325 TABLET ORAL at 23:10

## 2018-06-27 RX ADMIN — NEOMYCIN AND POLYMYXIN B SULFATES: 40; 200000 IRRIGANT IRRIGATION at 11:45

## 2018-06-27 RX ADMIN — DOCUSATE SODIUM 100 MG: 100 CAPSULE, LIQUID FILLED ORAL at 07:58

## 2018-06-27 RX ADMIN — CIPROFLOXACIN 400 MG: 2 INJECTION, SOLUTION INTRAVENOUS at 11:10

## 2018-06-27 RX ADMIN — Medication 10 ML: at 11:09

## 2018-06-27 RX ADMIN — NEOMYCIN AND POLYMYXIN B SULFATES: 40; 200000 IRRIGANT IRRIGATION at 00:57

## 2018-06-27 RX ADMIN — HYDROXYZINE PAMOATE 25 MG: 25 CAPSULE ORAL at 23:12

## 2018-06-27 RX ADMIN — CIPROFLOXACIN 400 MG: 2 INJECTION, SOLUTION INTRAVENOUS at 22:31

## 2018-06-27 RX ADMIN — HYDROCODONE BITARTRATE AND ACETAMINOPHEN 1 TABLET: 5; 325 TABLET ORAL at 15:09

## 2018-06-27 RX ADMIN — HYDROCODONE BITARTRATE AND ACETAMINOPHEN 1 TABLET: 5; 325 TABLET ORAL at 07:58

## 2018-06-27 RX ADMIN — NEOMYCIN AND POLYMYXIN B SULFATES: 40; 200000 IRRIGANT IRRIGATION at 06:40

## 2018-06-27 RX ADMIN — TAMSULOSIN HYDROCHLORIDE 0.4 MG: 0.4 CAPSULE ORAL at 07:58

## 2018-06-27 ASSESSMENT — PAIN SCALES - GENERAL
PAINLEVEL_OUTOF10: 5
PAINLEVEL_OUTOF10: 4
PAINLEVEL_OUTOF10: 5

## 2018-06-27 ASSESSMENT — PAIN DESCRIPTION - LOCATION: LOCATION: HEAD

## 2018-06-27 ASSESSMENT — PAIN DESCRIPTION - ONSET: ONSET: ON-GOING

## 2018-06-27 ASSESSMENT — PAIN DESCRIPTION - PAIN TYPE: TYPE: ACUTE PAIN

## 2018-06-27 NOTE — PROGRESS NOTES
HOSP GENERAL [facility]  Swing Bed Interdisciplinary Care Plan Conference Report   Recertification for Continued Skilled Care. Patients name:Alden Justin  Date of Conference: 6/27/2018    The Following Information was discussed and agreed upon with the patient and/or Caregivers as listed below.     Names of Team Members and Caregivers present for meeting:  :   Nursing:   Therapy: Squire Prader, PT; Opal Short, L/OTR  Dietician: GRACIE Dunbar  Activities: Yara Allen  Pastoral Care: Sr Josey Wei  Caregivers:    [] Spouse  [] Child/Children [] Significant Other   [] Other:     Nutrition:  Current Body Weight:   Wt Readings from Last 1 Encounters:   06/22/18 213 lb 6.4 oz (96.8 kg)     Weight Change: Weight Change: 5.6% loss,  recently, and intentional r/t fluid retention decreases  Current Diet order:DIET GENERAL;  Dietary Nutrition Supplements: Frozen Oral Supplement  Intakes: %  Diet Education Provided: Pt encouraged to eat protein foods    Spiritual:  Muslim needs met: [x] Yes  [] No  [] N/A  Notified Vicente Louise or Brijesh of admission: [] Yes  [] No  [x] N/A  Patient added to Communion List: [] Yes  [] No  [x] N/A  Any other concerns or comments:     Occupational Therapy:  Current ADL Status: ADL  Feeding: Independent  Grooming: Independent  UE Bathing: Setup  LE Bathing: Setup, Stand by assistance  UE Dressing: Setup  LE Dressing: Contact guard assistance (for donning underwear)  Toileting: Contact guard assistance  Safety: Good  Recommendations for adaptive equipment:   [] Long handled sponge   [] Long Handled Shoe Horn  [] Extended Tub Bench    Physical Therapy:  Transfers:   Transfers  Sit to Stand: Stand by assistance, Supervision  Stand to sit: Stand by assistance, Supervision  Bed to Chair: Minimal assistance  Mobility/Ambulation:   Ambulation 1  Surface: level tile  Device: Rolling Walker  Assistance: Supervision, Modified Independent  Quality of Gait: steady  Distance: 75 ftx2  Comments:

## 2018-06-27 NOTE — PROGRESS NOTES
[facility]  [facility]upational Therapy  Daily Note  Date: 2018  Patient Name: Luisito Elaine        MRN: [id number]    : 1930  (80 y.o.)    Subjective: Pt in chair upon arrival    Objective  ADL     Feeding: Independent  Grooming: Independent  UE Bathing: Setup  LE Bathing: Setup, Stand by assistance  UE Dressing: Setup  LE Dressing: Contact guard assistance (for donning underwear)  Toileting: Contact guard assistance  Toilet Transfer: Contact guard assistance           Sit to Supine: Stand by assistance     Sit to stand: Stand by assistance  Stand to sit: Stand by assistance                     Balance  Sitting Balance: Independent  Standing Balance: Stand by assistance  Standing Balance  Time: ~10 min  Activity: UE task, ring tree  Sit to stand: Stand by assistance  Stand to sit: Stand by assistance    Assessment  Assessment: Pt in chair upon arrival and agreeable to sponge bath sink side this am. Pt completes majority of bathing tasks while standing, only sits to complete washing LEs. Completes bathing/dressing as documented above. Pt does require some assist for cleanliness. Pt agreeable to donning underwear this date after much encouragement. Does so w/ s/u only & no assist from therapist. completes STS transfers this date w/ SBA. Completes oral hygiene at sink w/ SBA. Returns to bed w/ SBA for bed mobility and CGA for ambulation w/ FWW. Remains in bed w/ call light in reach and all needs met. Prognosis: Good  Discharge Recommendations: Continue to assess pending progress         Goals  Short Term Goals  Time Frame for Short term goals: 1 week (7-2-18)  Short term goal 1: Patient to tolerate static standing x 5 minutes in order to complete self care activities. Short term goal 2: Patient to complete LB bathing and dressing with set up only   Short term goal 3: Patient to complete toileting task and hygiene with supervision.          Long Term Goals  Time Frame for Long term goals : 2 weeks

## 2018-06-27 NOTE — PROGRESS NOTES
Dr. Concetta Serrano in and he changes dressing of left leg. Large amount of old dead skin noted and removed per Dr. Concetta Serrano. Lower left leg remains slightly reddened with open areas.  irrigant replaced on leg then wrapped in kerlix and net webbing. Pt keeps urinal in place, states that he feels like he is emptying his bladder fully.

## 2018-06-27 NOTE — PROGRESS NOTES
Phone: [place]  Date: 2018  Fax: [phone]      Physical Therapy    Daily Note    Patient Name: Jeanie Hampton      : 1930  (80 y.o.)  MRN: [id number]     [x] Patient requires additional Physical Therapy    [] Anticipate Physical Therapy Discharge Soon     Assessment        Supine to Sit: Supervision    Sit to Stand: Supervision, Modified independent  Stand to sit: Supervision, Modified independent             Ambulation 1  Surface: level tile  Device: Rolling Walker  Assistance: Modified Independent  Quality of Gait: steady  Distance: 75 ft x1  Comments: patient room to therapy room        Stairs  # Steps : 5 (x2 laps)  Stairs Height: 4\" (and 6\")  Rails: Bilateral  Assistance: Stand by assistance, Supervision    Assessment: Patient in bed upon arrival to room. Supine to sit is supervision/mod I. Sit to stand from bed is mod I.  Ambulates with wheeled walker to therapy room and complete exercise per doc flowsheet. Up/down steps with B handrails and supervision. No LOB noted with gait. Remains in therapy room following to work with ANISH.      Safety Devices  Type of devices: Call light within reach, Gait belt, Left in chair, Nurse notified          Time In: 1331  Time Out: 1416  Timed Coded Minutes:45  Total Treatment Time: 45    Exercises:  See Flowsheets    Plan  Cont Per Plan Of Care    Goals  Short Term Goals  Time Frame for Short term goals: 1 week  Short term goal 1: Patient to have increase strength left knee extension 4+/5 to transfer sit to stand modified independent-MET  Short term goal 2: Patient to walk 100 ftx2 with wheeled walker and SBA-MET  Short term goal 3: Patient to have increase strength left hip flexion 4/5 to transfer sit to supine independently-MET          Long Term Goals  Time Frame for Long term goals : 2 weeks  Long term goal 1: Patient to have improved dynamic balance to good in order to complete ADLs safely  Long term goal 2: Patient to walk with least restrictive device 150 ft x2 modified independent  Long term goal 3: Patient to ambulate up and down 3 stair steps with handrail independently          [address] Number: PTA    Date: 6/27/2018

## 2018-06-27 NOTE — PROGRESS NOTES
Nutrition Assessment    Type and Reason for Visit: Reassess    Nutrition Recommendations:   1. Continue current diet. 2. Continue current supplements. 3. Recommendation in place for addition of MVI with minerals and vitamin C.   4. Encourage protein foods for wound healing. Nutrition Assessment:  · Subjective Assessment: OT therapist Danelle Dewey states Pt c/o of \"burping up ensure. \" Pt would like to try the magic cup-vanilla. States he takes a MVI with minerals at home. Pt encouraged to eat protein foods. He wants to be home by [birth year]. · Nutrition-Focused Physical Findings: orbital and temporal wasting  · Wound Type: Open Wounds  · Current Nutrition Therapies:  · Oral Diet Orders: General   · Oral Diet intake: %  · Oral Nutrition Supplement (ONS) Orders: Standard High Calorie Oral Supplement  · ONS intake: %  · Anthropometric Measures:  · Ht: 5' 9\" (175.3 cm)   · Current Body Wt: 213 lb 6.4 oz (96.8 kg)  · Admission Body Wt: 213 lb 6.4 oz (96.8 kg)  · Usual Body Wt: 226 lb (102.5 kg)  · % Weight Change: 5.6% loss,  recently, and intentional r/t fluid retention decreases  · Ideal Body Wt: 160 lb (72.6 kg), % Ideal Body 133%  · Adjusted Body Wt: 173 lb 11.6 oz (78.8 kg), body weight adjusted for Obesity  · BMI Classification: BMI 30.0 - 34.9 Obese Class I  Wt Readings from Last 3 Encounters:   06/22/18 213 lb 6.4 oz (96.8 kg)   08/25/16 198 lb (89.8 kg)     No results for input(s): NA, K, CL, CO2, BUN, CREATININE, GLUCOSE, ALT, ALB, ALKPHOS, GFR in the last 72 hours. Invalid input(s): CA,  AST,  BILITOT, OSMO   Lab Results   Component Value Date    LABALBU 3.2 06/18/2018      Estimated Intake vs Estimated Needs: Intake Meets Needs    Nutrition Risk Level: Moderate, High  PO is good of meals and supplements. MVI with minerals and vitamin C have not been added to medication regimen at this time. No new weight. F/u as needed.    Nutrition Interventions:   Continue current diet, Modify current diet (d/c ensure, start magic cup-vanilla)  Continued Inpatient Monitoring, Coordination of Care, Education Not Indicated    Nutrition Evaluation:   · Evaluation: Progressing toward goals   · Goals: PO>75% meals and supplements    · Monitoring: Meal Intake, Supplement Intake, Pertinent Labs, Weight, Ascites/Edema, Wound Healing      Electronically signed by Camille Willis RD, LD on 6/27/18 at 11:50 AM    Contact Number: [id number]

## 2018-06-28 PROCEDURE — 97110 THERAPEUTIC EXERCISES: CPT

## 2018-06-28 PROCEDURE — 2500000003 HC RX 250 WO HCPCS: Performed by: SURGERY

## 2018-06-28 PROCEDURE — 6360000002 HC RX W HCPCS: Performed by: SURGERY

## 2018-06-28 PROCEDURE — 2580000003 HC RX 258: Performed by: SURGERY

## 2018-06-28 PROCEDURE — 6370000000 HC RX 637 (ALT 250 FOR IP): Performed by: SURGERY

## 2018-06-28 PROCEDURE — 97530 THERAPEUTIC ACTIVITIES: CPT

## 2018-06-28 PROCEDURE — 97116 GAIT TRAINING THERAPY: CPT

## 2018-06-28 PROCEDURE — 97535 SELF CARE MNGMENT TRAINING: CPT

## 2018-06-28 PROCEDURE — 1200000002 HC SEMI PRIVATE SWING BED

## 2018-06-28 RX ORDER — FUROSEMIDE 20 MG/1
20 TABLET ORAL ONCE
Status: COMPLETED | OUTPATIENT
Start: 2018-06-28 | End: 2018-06-28

## 2018-06-28 RX ADMIN — FUROSEMIDE 20 MG: 20 TABLET ORAL at 07:00

## 2018-06-28 RX ADMIN — NEOMYCIN AND POLYMYXIN B SULFATES: 40; 200000 IRRIGANT IRRIGATION at 06:47

## 2018-06-28 RX ADMIN — CIPROFLOXACIN 400 MG: 2 INJECTION, SOLUTION INTRAVENOUS at 23:25

## 2018-06-28 RX ADMIN — NEOMYCIN AND POLYMYXIN B SULFATES: 40; 200000 IRRIGANT IRRIGATION at 12:28

## 2018-06-28 RX ADMIN — HYDROCODONE BITARTRATE AND ACETAMINOPHEN 1 TABLET: 5; 325 TABLET ORAL at 23:25

## 2018-06-28 RX ADMIN — DOCUSATE SODIUM 100 MG: 100 CAPSULE, LIQUID FILLED ORAL at 08:53

## 2018-06-28 RX ADMIN — HYDROXYZINE PAMOATE 25 MG: 25 CAPSULE ORAL at 07:00

## 2018-06-28 RX ADMIN — TAMSULOSIN HYDROCHLORIDE 0.4 MG: 0.4 CAPSULE ORAL at 21:17

## 2018-06-28 RX ADMIN — Medication 500 MG: at 08:57

## 2018-06-28 RX ADMIN — TAMSULOSIN HYDROCHLORIDE 0.4 MG: 0.4 CAPSULE ORAL at 08:53

## 2018-06-28 RX ADMIN — Medication 10 ML: at 08:54

## 2018-06-28 RX ADMIN — HYDROXYZINE PAMOATE 25 MG: 25 CAPSULE ORAL at 23:26

## 2018-06-28 RX ADMIN — HYDROCODONE BITARTRATE AND ACETAMINOPHEN 1 TABLET: 5; 325 TABLET ORAL at 15:26

## 2018-06-28 RX ADMIN — Medication 10 ML: at 21:17

## 2018-06-28 RX ADMIN — CLOPIDOGREL BISULFATE 75 MG: 75 TABLET ORAL at 08:53

## 2018-06-28 RX ADMIN — HYDROXYZINE PAMOATE 25 MG: 25 CAPSULE ORAL at 15:26

## 2018-06-28 RX ADMIN — Medication 10 ML: at 11:30

## 2018-06-28 RX ADMIN — CIPROFLOXACIN 400 MG: 2 INJECTION, SOLUTION INTRAVENOUS at 11:30

## 2018-06-28 RX ADMIN — Medication 10 ML: at 23:26

## 2018-06-28 ASSESSMENT — PAIN SCALES - GENERAL
PAINLEVEL_OUTOF10: 5
PAINLEVEL_OUTOF10: 6
PAINLEVEL_OUTOF10: 3
PAINLEVEL_OUTOF10: 0
PAINLEVEL_OUTOF10: 3

## 2018-06-28 NOTE — PROGRESS NOTES
[facility]  Occupational Therapy  Daily Note  Date: 2018  Patient Name: Geeta Saavedra        MRN: [id number]    : 1930  (80 y.o.)    Subjective: Pt in bed upon arrival    Assessment  Assessment: Pt in bed upon arrival sleeping. Pt is incontinent of urine, agreeable to transfer to BR. Pt completes supine to sit w/ SBA and ambulates into BR to toilet w/ FWW & SBA. Pt has BM and requires Min A for cleanliness for buttocks. Stands sink side to wash private area d/t urine incontinence. Agreeable to don depends this afternoon, does so w/ s/u only. Ambulates to therapy room w/ FWW & CGA, completes REJI UB exercises w/ good tolerance this afternoon. Remains in therapy room w/ PTA . Prognosis: Good  Discharge Recommendations: Continue to assess pending progress         Goals  Short Term Goals  Time Frame for Short term goals: 1 week (18)  Short term goal 1: Patient to tolerate static standing x 5 minutes in order to complete self care activities. -MET  Short term goal 2: Patient to complete LB bathing and dressing with set up only   Short term goal 3: Patient to complete toileting task and hygiene with supervision. Long Term Goals  Time Frame for Long term goals : 2 weeks (18)  Long term goal 1: Patient to complete UB/LB bathing independently. Long term goal 2: Patient to complete UB/LB dressing independently. Long term goal 3: Patient to tolerate static stnading x7 minutes in order to complete laundry tasks. -MET  Long term goal 4: Patient to complete all toileting tasks and hygiene independently. Long term goal 5: Patient to demonstrate independence with simple meal prep activity in order to return home independently.         Time In: 1405  Time Out: 1447  Timed Coded Minutes: 42  Total Treatment Time: [address] [place]   MOT, OTR/L  Date: 2018

## 2018-06-28 NOTE — PROGRESS NOTES
[facility]  Occupational Therapy  Daily Note  Date: 2018  Patient Name: Elza Marie        MRN: [id number]    : 1930  (80 y.o.)    Subjective: Pt in therapy room w/ PTA upon arrival    Objective  ADL     Feeding: Independent  Grooming: Independent  UE Bathing: Setup  LE Bathing: Setup, Stand by assistance  UE Dressing: Setup  LE Dressing: Contact guard assistance (for donning underwear)  Toileting: Contact guard assistance  Toilet Transfer: Contact guard assistance           Sit to Supine: Stand by assistance     Sit to stand: Stand by assistance  Stand to sit: Stand by assistance                     Balance  Sitting Balance: Independent  Standing Balance: Stand by assistance  Standing Balance  Time: ~10 min  Activity: UE task, ring tree  Sit to stand: Stand by assistance  Stand to sit: Stand by assistance    Assessment  Assessment: Pt in therapy room w/ PTA upon arrival. Agreeable to OT tx. Completes REJI UB exercises 2x15 w/ 2# wts, demo's fair activity tolerance but does demo min SOB w/ activity. Requires short rest breaks. Completes LB dressing task, pt demo's to OTR  donning/doffing R sock. Pt demo's ability to don/doff sock independently w/o assist for OTR or use of AE. Pt does not demo donning/doffing L sock d/t wound dressing. Concluded w/ static standing activity x 7 min 53 seconds to improve standing tolerance needed to complete meal prep tasks & laundry tasks. Pt stood w/o LOB and w/ SBA, no increase in SOB and no rest breaks. Requests to return to room as pt is fatigued & states L lower leg is beginning to ache. Returns to room via [address] [place] [address], remains in chair w/ call light in reach and all needs met. Prognosis: Good  Discharge Recommendations: Continue to assess pending progress    Goals  Short Term Goals  Time Frame for Short term goals: 1 week (7-2-18)  Short term goal 1: Patient to tolerate static standing x 5 minutes in order to complete self care activities.  -MET  Short term goal 2: Patient to complete LB bathing and dressing with set up only   Short term goal 3: Patient to complete toileting task and hygiene with supervision. Long Term Goals  Time Frame for Long term goals : 2 weeks (7-9-18)  Long term goal 1: Patient to complete UB/LB bathing independently. Long term goal 2: Patient to complete UB/LB dressing independently. Long term goal 3: Patient to tolerate static stnading x7 minutes in order to complete laundry tasks. -MET  Long term goal 4: Patient to complete all toileting tasks and hygiene independently. Long term goal 5: Patient to demonstrate independence with simple meal prep activity in order to return home independently.      Time In: 1005  Time Out: 1042  Timed Coded Minutes: 37  Total Treatment Time: JULIET Atwood/KENYON  Date: 6/28/2018

## 2018-06-28 NOTE — PROGRESS NOTES
Patient awake in bed. Alert and oriented. Eating breakfast.  Denies pain at this time. Does complain of itching in left leg. Vistaril given at 0700. Agrees to getting up to chair after breakfast.  Gait steady with walker. Call light in reach. Denies needs.

## 2018-06-28 NOTE — PROGRESS NOTES
Phone: [place]  Date: 2018  Fax: [phone]      Physical Therapy    Daily Note    Patient Name: Radha Alcaraz      : 1930  (80 y.o.)  MRN: [id number]     [x] Patient requires additional Physical Therapy    [] Anticipate Physical Therapy Discharge Soon     Assessment       Sit to Stand: Supervision, Modified independent  Stand to sit: Supervision, Modified independent  Bed to Chair: Minimal assistance             Ambulation 1  Surface: level tile  Device: Rolling Walker  Assistance: Modified Independent  Quality of Gait: steady  Distance: 75 ft x1  Comments: patient room to therapy room        Stairs  # Steps : 5 (x2 laps)  Stairs Height: 4\" (and 6\")  Rails: Bilateral  Assistance: Supervision, Modified independent     Assessment: Patient in chair upon entry to room. Sit to stand from chair is supervision/mod I.  Ambulates with wheeled walker to therapy room without LOB. Completes exercise per doc flowsheet. Up/down steps with B handrails and supervision/mod I.  Remains in therapy room following to work with OT.      Safety Devices  Type of devices: Call light within reach, Gait belt, Left in chair, Nurse notified          Time In: 926  Time Out: 1005    Timed Coded Minutes: 39  Total Treatment Time: 39      Exercises:  See Flowsheets    Plan  Cont Per Plan Of Care    Goals  Short Term Goals  Time Frame for Short term goals: 1 week  Short term goal 1: Patient to have increase strength left knee extension 4+/5 to transfer sit to stand modified independent-MET  Short term goal 2: Patient to walk 100 ftx2 with wheeled walker and SBA-MET  Short term goal 3: Patient to have increase strength left hip flexion 4/5 to transfer sit to supine independently-MET          Long Term Goals  Time Frame for Long term goals : 2 weeks  Long term goal 1: Patient to have improved dynamic balance to good in order to complete ADLs safely  Long term goal 2: Patient to walk with least restrictive device 150 ft x2 modified independent  Long term goal 3: Patient to ambulate up and down 3 stair steps with handrail independently          [address] Number: PTA    Date: 6/28/2018

## 2018-06-28 NOTE — CONSULTS
Smokeless tobacco: Not on file    Alcohol use No    Drug use: Unknown    Sexual activity: Not on file     Other Topics Concern    Not on file     Social History Narrative    No narrative on file       Family History:    No family history on file. REVIEW OF SYSTEMS:  Constitutional: Negative for fever, chills and unexpected weight change. Respiratory: Negative for shortness of breath and wheezing. Cardiovascular: Negative for chest pain and palpitations. Gastrointestinal: Positive for constipation. Negative for nausea or vomiting. Endocrine: Negative for polydipsia and polyuria. Genitourinary: Positive for frequency, nocturia, dysuria, difficulty with urination. Negative for gross hematuria or flank pain. Musculoskeletal: Negative for myalgias and joint swelling. Skin: Positive for rash and wound. Neurological: Negative for dizziness and headaches. Hematological: Negative for adenopathy. Does not bruise/bleed easily. PHYSICAL EXAM:  Patient Vitals for the past 24 hrs:   BP Temp Temp src Pulse Resp SpO2 Weight   06/29/18 0945 104/63 98.3 °F (36.8 °C) Oral 78 18 97 % -   06/29/18 0630 - - - - - - 208 lb 8.9 oz (94.6 kg)   06/29/18 0615 123/75 98 °F (36.7 °C) Oral 82 18 97 % -   06/29/18 0015 119/78 97.9 °F (36.6 °C) Oral 79 20 98 % -   06/28/18 2140 123/74 98 °F (36.7 °C) Oral 88 22 97 % -   06/28/18 1545 128/86 97.8 °F (36.6 °C) Oral 95 20 99 % -     Constitutional: Patient resting comfortably, in no acute distress. Neuro: Alert and oriented to person place and time. Cranial nerves grossly intact. Psych: Mood and affect normal.  Skin: Warm, dry, non-diaphoretic  HEENT: normocephalic, atraumatic  Lymphatics: No palpable lymphadenopathy  Lungs: Respiratory effort normal, unlabored  Cardiovascular:  Normal peripheral pulses  Abdomen: Soft, non-tender, non-distended with no organomegaly or palpable masses. : No CVA tenderness bilat.  Bladder non-tender and not RADHA Posey - CNP on 6/29/2018 at 12:50 PM

## 2018-06-28 NOTE — PROGRESS NOTES
Pod # 8      Subjective: Patient complains of itching . Pain is not so much a problem he reports. He denies chest pains and denies shortness of breath. Objective: chest clear slow exp. Card rrr I do dressing change and debridement. Wounds cleansed dried and new fresh medicated gauze applied. Patient tolerates well. Area beneath xeroform gauze develops florid exudate in 24 hours. Will therefore stop xeroform. Assessment: polymicrobial infection left lower leg with superimposed arterial ischemia. Plan: continue present regimen. All questions answered.     Sawyer Baca MD

## 2018-06-29 PROBLEM — R33.9 URINARY RETENTION: Status: ACTIVE | Noted: 2018-06-29

## 2018-06-29 PROBLEM — R35.0 FREQUENCY OF MICTURITION: Status: ACTIVE | Noted: 2018-06-29

## 2018-06-29 PROBLEM — R30.0 DYSURIA: Status: ACTIVE | Noted: 2018-06-29

## 2018-06-29 PROBLEM — N40.1 BPH WITH OBSTRUCTION/LOWER URINARY TRACT SYMPTOMS: Status: ACTIVE | Noted: 2018-06-29

## 2018-06-29 PROBLEM — N13.8 BPH WITH OBSTRUCTION/LOWER URINARY TRACT SYMPTOMS: Status: ACTIVE | Noted: 2018-06-29

## 2018-06-29 PROCEDURE — 2580000003 HC RX 258: Performed by: SURGERY

## 2018-06-29 PROCEDURE — 97116 GAIT TRAINING THERAPY: CPT

## 2018-06-29 PROCEDURE — 6360000002 HC RX W HCPCS: Performed by: SURGERY

## 2018-06-29 PROCEDURE — 2500000003 HC RX 250 WO HCPCS: Performed by: SURGERY

## 2018-06-29 PROCEDURE — 6370000000 HC RX 637 (ALT 250 FOR IP): Performed by: SURGERY

## 2018-06-29 PROCEDURE — 97530 THERAPEUTIC ACTIVITIES: CPT

## 2018-06-29 PROCEDURE — 97110 THERAPEUTIC EXERCISES: CPT

## 2018-06-29 PROCEDURE — 1200000002 HC SEMI PRIVATE SWING BED

## 2018-06-29 RX ADMIN — NEOMYCIN AND POLYMYXIN B SULFATES: 40; 200000 IRRIGANT IRRIGATION at 18:13

## 2018-06-29 RX ADMIN — CIPROFLOXACIN 400 MG: 2 INJECTION, SOLUTION INTRAVENOUS at 12:42

## 2018-06-29 RX ADMIN — NEOMYCIN AND POLYMYXIN B SULFATES: 40; 200000 IRRIGANT IRRIGATION at 05:33

## 2018-06-29 RX ADMIN — NEOMYCIN AND POLYMYXIN B SULFATES: 40; 200000 IRRIGANT IRRIGATION at 23:15

## 2018-06-29 RX ADMIN — Medication 500 MG: at 07:48

## 2018-06-29 RX ADMIN — HYDROXYZINE PAMOATE 25 MG: 25 CAPSULE ORAL at 18:33

## 2018-06-29 RX ADMIN — TAMSULOSIN HYDROCHLORIDE 0.4 MG: 0.4 CAPSULE ORAL at 07:46

## 2018-06-29 RX ADMIN — NEOMYCIN AND POLYMYXIN B SULFATES: 40; 200000 IRRIGANT IRRIGATION at 13:59

## 2018-06-29 RX ADMIN — Medication 10 ML: at 21:16

## 2018-06-29 RX ADMIN — NEOMYCIN AND POLYMYXIN B SULFATES: 40; 200000 IRRIGANT IRRIGATION at 01:00

## 2018-06-29 RX ADMIN — Medication 10 ML: at 21:19

## 2018-06-29 RX ADMIN — DOCUSATE SODIUM 100 MG: 100 CAPSULE, LIQUID FILLED ORAL at 07:47

## 2018-06-29 RX ADMIN — CLOPIDOGREL BISULFATE 75 MG: 75 TABLET ORAL at 07:46

## 2018-06-29 RX ADMIN — HYDROXYZINE PAMOATE 25 MG: 25 CAPSULE ORAL at 07:46

## 2018-06-29 RX ADMIN — HYDROCODONE BITARTRATE AND ACETAMINOPHEN 1 TABLET: 5; 325 TABLET ORAL at 05:32

## 2018-06-29 RX ADMIN — HYDROCODONE BITARTRATE AND ACETAMINOPHEN 1 TABLET: 5; 325 TABLET ORAL at 23:15

## 2018-06-29 RX ADMIN — TAMSULOSIN HYDROCHLORIDE 0.4 MG: 0.4 CAPSULE ORAL at 21:16

## 2018-06-29 RX ADMIN — CIPROFLOXACIN 400 MG: 2 INJECTION, SOLUTION INTRAVENOUS at 23:15

## 2018-06-29 RX ADMIN — Medication 10 ML: at 07:49

## 2018-06-29 ASSESSMENT — PAIN SCALES - GENERAL
PAINLEVEL_OUTOF10: 6
PAINLEVEL_OUTOF10: 0
PAINLEVEL_OUTOF10: 4
PAINLEVEL_OUTOF10: 0

## 2018-06-29 NOTE — PROGRESS NOTES
Phone: [place]  Date: 2018  Fax: [phone]      Physical Therapy    Daily Note    Patient Name: Radha Alcaraz      : 1930  (80 y.o.)  MRN: [id number]     [x] Patient requires additional Physical Therapy    [] Anticipate Physical Therapy Discharge Soon     Assessment          Sit to Stand: Supervision, Modified independent  Stand to sit: Supervision, Modified independent  Bed to Chair: Minimal assistance             Ambulation 1  Surface: level tile  Device: Rolling Walker  Assistance: Modified Independent  Quality of Gait: steady  Distance: 75 ft x1  Comments: patient room to therapy room        Stairs  # Steps : 5 (x2 laps)  Stairs Height: 4\" (and 6\")  Rails: Bilateral  Assistance: Modified independent     Assessment: Patinet in chair visiting with his son. Sit to stand from chair is mod I.  Ambulates with wheeled walker to therapy room. No LOB noted with gait. Completes exercise and steps per doc flowsheet. No LOB noted during standing activities. Remainsin therapy room to work with OT following session.   Son present for full session,     Safety Devices  Type of devices: Call light within reach, Gait belt, Left in bed, Nurse notified          Time CL:7643  Time ICC:8404  Timed Coded Minutes: 38  Total Treatment Time: 38    Exercises:  See Flowsheets    Plan  Cont Per Plan Of Care    Goals  Short Term Goals  Time Frame for Short term goals: 1 week  Short term goal 1: Patient to have increase strength left knee extension 4+/5 to transfer sit to stand modified independent-MET  Short term goal 2: Patient to walk 100 ftx2 with wheeled walker and SBA-MET  Short term goal 3: Patient to have increase strength left hip flexion 4/5 to transfer sit to supine independently-MET          Long Term Goals  Time Frame for Long term goals : 2 weeks  Long term goal 1: Patient to have improved dynamic balance to good in order to complete ADLs safely  Long term goal 2: Patient to walk with least restrictive device 150 ft x2 modified independent  Long term goal 3: Patient to ambulate up and down 3 stair steps with handrail independently          [address] Number: PTA    Date: 6/29/2018

## 2018-06-29 NOTE — PROGRESS NOTES
Pod #9      Subjective: Patient reports occasionally anxious and vistaril helps him a lot in that matter. He reports itching of toes and forefoot. He denies chest pains denies shortness of breath. Objective: chest slow expiration no rales no rhonchi card rrr. Abdomen soft normal bowel sounds. No calf compression tenderness. I do the dressing change. All dressings taken down to the skin level. Wounds completely cleansed and debrided. Photographs taken. New medicated dressings applied. Assessment: polymicrobial infection left lower leg with superimposed arteria ischemia. Plan: all of the patients questions answered. Tomorrow am one dose lasix  Continue current round the clock intensive wound care.       Ginger Bradley MD

## 2018-06-29 NOTE — PROGRESS NOTES
[facility]  Occupational Therapy  Daily Note  Date: 2018  Patient Name: Vianca Mckeon        MRN: [id number]    : 1930  (80 y.o.)    Subjective: Pt in bed upon arrival    Objective           Sit to Supine: Stand by assistance     Sit to stand: Stand by assistance  Stand to sit: Stand by assistance          Assessment  Assessment: Patient is in treatment room upon arrival.  Tolerates exercises well, increased weights and reps without difficulty. Noted mild SOB with standing activitiy. Patient ambulated back to room with supervision completing bed mobility with supervision as well. Plan to D/C patient soon as he is progressing well with OT. Prognosis: Good  Discharge Recommendations: Continue to assess pending progress                Exercises:  See Flowsheets    Goals  Short Term Goals  Time Frame for Short term goals: 1 week (18)  Short term goal 1: Patient to tolerate static standing x 5 minutes in order to complete self care activities. -MET  Short term goal 2: Patient to complete LB bathing and dressing with set up only   Short term goal 3: Patient to complete toileting task and hygiene with supervision. Long Term Goals  Time Frame for Long term goals : 2 weeks (18)  Long term goal 1: Patient to complete UB/LB bathing independently. Long term goal 2: Patient to complete UB/LB dressing independently. Long term goal 3: Patient to tolerate static stnading x7 minutes in order to complete laundry tasks. -MET  Long term goal 4: Patient to complete all toileting tasks and hygiene independently. Long term goal 5: Patient to demonstrate independence with simple meal prep activity in order to return home independently.      Timed Code Treatment Minutes: 41 Minutes     Time In: 5402  Time Out: 1016  Timed Coded Minutes: 41  Total Treatment Time: Danial 18  OTR/L  Date: 2018

## 2018-06-30 PROCEDURE — 6370000000 HC RX 637 (ALT 250 FOR IP): Performed by: SURGERY

## 2018-06-30 PROCEDURE — 97110 THERAPEUTIC EXERCISES: CPT

## 2018-06-30 PROCEDURE — 97116 GAIT TRAINING THERAPY: CPT

## 2018-06-30 PROCEDURE — 2500000003 HC RX 250 WO HCPCS: Performed by: SURGERY

## 2018-06-30 PROCEDURE — 1200000002 HC SEMI PRIVATE SWING BED

## 2018-06-30 PROCEDURE — 97535 SELF CARE MNGMENT TRAINING: CPT

## 2018-06-30 PROCEDURE — 2580000003 HC RX 258: Performed by: SURGERY

## 2018-06-30 PROCEDURE — 6360000002 HC RX W HCPCS: Performed by: SURGERY

## 2018-06-30 RX ORDER — FUROSEMIDE 20 MG/1
20 TABLET ORAL ONCE
Status: COMPLETED | OUTPATIENT
Start: 2018-06-30 | End: 2018-06-30

## 2018-06-30 RX ORDER — NEOMYCIN AND POLYMYXIN B SULFATES 40; 200000 MG/ML; [USP'U]/ML
SOLUTION IRRIGATION ONCE
Status: COMPLETED | OUTPATIENT
Start: 2018-06-30 | End: 2018-06-30

## 2018-06-30 RX ADMIN — Medication 10 ML: at 22:00

## 2018-06-30 RX ADMIN — CIPROFLOXACIN 400 MG: 2 INJECTION, SOLUTION INTRAVENOUS at 22:00

## 2018-06-30 RX ADMIN — DOCUSATE SODIUM 100 MG: 100 CAPSULE, LIQUID FILLED ORAL at 10:22

## 2018-06-30 RX ADMIN — CLOPIDOGREL BISULFATE 75 MG: 75 TABLET ORAL at 10:22

## 2018-06-30 RX ADMIN — NEOMYCIN AND POLYMYXIN B SULFATES: 40; 200000 IRRIGANT IRRIGATION at 06:42

## 2018-06-30 RX ADMIN — Medication 10 ML: at 11:56

## 2018-06-30 RX ADMIN — TAMSULOSIN HYDROCHLORIDE 0.4 MG: 0.4 CAPSULE ORAL at 10:22

## 2018-06-30 RX ADMIN — NEOMYCIN AND POLYMYXIN B SULFATES: 40; 200000 IRRIGANT IRRIGATION at 18:27

## 2018-06-30 RX ADMIN — HYDROXYZINE PAMOATE 25 MG: 25 CAPSULE ORAL at 00:41

## 2018-06-30 RX ADMIN — MICONAZOLE NITRATE: 2 POWDER TOPICAL at 11:36

## 2018-06-30 RX ADMIN — NEOMYCIN AND POLYMYXIN B SULFATES: 40; 200000 IRRIGANT IRRIGATION at 11:36

## 2018-06-30 RX ADMIN — MICONAZOLE NITRATE: 2 POWDER TOPICAL at 22:01

## 2018-06-30 RX ADMIN — TAMSULOSIN HYDROCHLORIDE 0.4 MG: 0.4 CAPSULE ORAL at 21:59

## 2018-06-30 RX ADMIN — HYDROXYZINE PAMOATE 25 MG: 25 CAPSULE ORAL at 21:59

## 2018-06-30 RX ADMIN — NEOMYCIN AND POLYMYXIN B SULFATES: 40; 200000 IRRIGANT IRRIGATION at 23:34

## 2018-06-30 RX ADMIN — HYDROXYZINE PAMOATE 25 MG: 25 CAPSULE ORAL at 11:56

## 2018-06-30 RX ADMIN — Medication 10 ML: at 10:22

## 2018-06-30 RX ADMIN — Medication 500 MG: at 10:25

## 2018-06-30 RX ADMIN — CIPROFLOXACIN 400 MG: 2 INJECTION, SOLUTION INTRAVENOUS at 10:25

## 2018-06-30 RX ADMIN — FUROSEMIDE 20 MG: 20 TABLET ORAL at 11:36

## 2018-06-30 RX ADMIN — HYDROCODONE BITARTRATE AND ACETAMINOPHEN 1 TABLET: 5; 325 TABLET ORAL at 22:00

## 2018-06-30 ASSESSMENT — PAIN SCALES - GENERAL
PAINLEVEL_OUTOF10: 5
PAINLEVEL_OUTOF10: 0
PAINLEVEL_OUTOF10: 0
PAINLEVEL_OUTOF10: 4

## 2018-06-30 NOTE — PROGRESS NOTES
[facility]  Occupational Therapy  Daily Note  Date: 2018  Patient Name: Shankar Ba        MRN: [id number]    : 1930  (80 y.o.)    Subjective: Patient is supine in bed upon arrival.     Objective  ADL     Feeding: Independent  Grooming: Independent  UE Bathing: Setup  LE Bathing: Setup, Stand by assistance  UE Dressing: Setup     Supine to Sit: Supervision  Sit to Supine: Supervision     Sit to stand: Stand by assistance  Stand to sit: Stand by assistance       Assessment  Assessment: Patient completed seated/standing sponge bath this am with set up only. Patient demonstrated good balance and safety awareness with ADL session. Ambulated to treatment room with supervison and use of w. walker. Patient completes BUE exercises with good tolerance. Short rest break between each exercise. Patient reports he is fatigued this am due to not sleeping well the previous night. Patient remains in treatment room to continue with PT. Prognosis: Good  Discharge Recommendations: Continue to assess pending progress                Exercises:  See Flowsheets    Goals  Short Term Goals  Time Frame for Short term goals: 1 week (18)  Short term goal 1: Patient to tolerate static standing x 5 minutes in order to complete self care activities. -MET  Short term goal 2: Patient to complete LB bathing and dressing with set up only - MET   Short term goal 3: Patient to complete toileting task and hygiene with supervision. - MET        Long Term Goals  Time Frame for Long term goals : 2 weeks (18)  Long term goal 1: Patient to complete UB/LB bathing independently. Long term goal 2: Patient to complete UB/LB dressing independently. Long term goal 3: Patient to tolerate static stnading x7 minutes in order to complete laundry tasks. -MET  Long term goal 4: Patient to complete all toileting tasks and hygiene independently.    Long term goal 5: Patient to demonstrate independence with simple meal prep activity in order to return home independently.      Timed Code Treatment Minutes: 44 Minutes     Time In: 0845  Time Out: 4106  Timed Coded Minutes: 44  Total Treatment Time: [address]   OTR/L Date: 6/30/2018

## 2018-06-30 NOTE — PROGRESS NOTES
Pod#10        Subjective: Patient denies severe pain denies chest pain. He reports good bowel movement. He reports still able to void. Objective: chest clear slow expiration no rales no rhonchi. Card. rrr. Abdomen soft. Mild oedema ankles. Today is lasix day. Small amount of crural candida. Afebrile vs stable. Ativan managed anxiety last evening. Capillary filling in toes less than 2 seconds and sensation and warmth present both legs. Assessment: polymicrobial infection and cellulitis left lower leg superimposed on ischemic left lower leg (arterial) now sp angioplasty. Plan: nystatin powder crural area  Lasix  Continue round the clock  Intensive wound care. Photographs tomorrow. All of the patients questions answered.         Geraldine Dao MD

## 2018-06-30 NOTE — PROGRESS NOTES
Nurse encourages pt to get up in recliner. Pt refuses and states \"I don't really like that chair. It's about as good as sitting on a park bench. \" States he is comfortable in bed. No additional needs. Call light in reach.

## 2018-06-30 NOTE — PROGRESS NOTES
goals : 2 weeks  Long term goal 1: Patient to have improved dynamic balance to good in order to complete ADLs safely  Long term goal 2: Patient to walk with least restrictive device 150 ft x2 modified independent  Long term goal 3: Patient to ambulate up and down 3 stair steps with handrail independently          [address] Number: PTA    Date: 6/30/2018

## 2018-07-01 PROCEDURE — 97110 THERAPEUTIC EXERCISES: CPT

## 2018-07-01 PROCEDURE — 1200000002 HC SEMI PRIVATE SWING BED

## 2018-07-01 PROCEDURE — 2500000003 HC RX 250 WO HCPCS: Performed by: SURGERY

## 2018-07-01 PROCEDURE — 97116 GAIT TRAINING THERAPY: CPT

## 2018-07-01 PROCEDURE — 97535 SELF CARE MNGMENT TRAINING: CPT

## 2018-07-01 PROCEDURE — 6360000002 HC RX W HCPCS: Performed by: SURGERY

## 2018-07-01 PROCEDURE — 6370000000 HC RX 637 (ALT 250 FOR IP): Performed by: SURGERY

## 2018-07-01 PROCEDURE — 2580000003 HC RX 258: Performed by: SURGERY

## 2018-07-01 RX ORDER — HYDROMORPHONE HCL 110MG/55ML
0.5 PATIENT CONTROLLED ANALGESIA SYRINGE INTRAVENOUS ONCE
Status: COMPLETED | OUTPATIENT
Start: 2018-07-01 | End: 2018-07-01

## 2018-07-01 RX ORDER — FUROSEMIDE 20 MG/1
20 TABLET ORAL ONCE
Status: COMPLETED | OUTPATIENT
Start: 2018-07-01 | End: 2018-07-01

## 2018-07-01 RX ADMIN — HYDROXYZINE PAMOATE 25 MG: 25 CAPSULE ORAL at 20:21

## 2018-07-01 RX ADMIN — HYDROCODONE BITARTRATE AND ACETAMINOPHEN 1 TABLET: 5; 325 TABLET ORAL at 06:22

## 2018-07-01 RX ADMIN — TAMSULOSIN HYDROCHLORIDE 0.4 MG: 0.4 CAPSULE ORAL at 08:56

## 2018-07-01 RX ADMIN — HYDROMORPHONE HYDROCHLORIDE 0.5 MG: 2 INJECTION INTRAMUSCULAR; INTRAVENOUS; SUBCUTANEOUS at 23:29

## 2018-07-01 RX ADMIN — FUROSEMIDE 20 MG: 20 TABLET ORAL at 18:02

## 2018-07-01 RX ADMIN — NEOMYCIN AND POLYMYXIN B SULFATES: 40; 200000 IRRIGANT IRRIGATION at 06:22

## 2018-07-01 RX ADMIN — HYDROCODONE BITARTRATE AND ACETAMINOPHEN 1 TABLET: 5; 325 TABLET ORAL at 18:02

## 2018-07-01 RX ADMIN — Medication 500 MG: at 11:22

## 2018-07-01 RX ADMIN — CIPROFLOXACIN 400 MG: 2 INJECTION, SOLUTION INTRAVENOUS at 22:12

## 2018-07-01 RX ADMIN — NEOMYCIN AND POLYMYXIN B SULFATES: 40; 200000 IRRIGANT IRRIGATION at 15:00

## 2018-07-01 RX ADMIN — DOCUSATE SODIUM 100 MG: 100 CAPSULE, LIQUID FILLED ORAL at 08:56

## 2018-07-01 RX ADMIN — TAMSULOSIN HYDROCHLORIDE 0.4 MG: 0.4 CAPSULE ORAL at 20:21

## 2018-07-01 RX ADMIN — Medication: at 23:30

## 2018-07-01 RX ADMIN — CIPROFLOXACIN 400 MG: 2 INJECTION, SOLUTION INTRAVENOUS at 11:21

## 2018-07-01 RX ADMIN — HYDROXYZINE PAMOATE 25 MG: 25 CAPSULE ORAL at 06:22

## 2018-07-01 RX ADMIN — MICONAZOLE NITRATE: 2 POWDER TOPICAL at 20:22

## 2018-07-01 RX ADMIN — MICONAZOLE NITRATE: 2 POWDER TOPICAL at 08:58

## 2018-07-01 RX ADMIN — Medication 10 ML: at 11:22

## 2018-07-01 RX ADMIN — CLOPIDOGREL BISULFATE 75 MG: 75 TABLET ORAL at 08:56

## 2018-07-01 RX ADMIN — Medication 10 ML: at 08:56

## 2018-07-01 RX ADMIN — Medication 500 MG: at 08:57

## 2018-07-01 RX ADMIN — NEOMYCIN AND POLYMYXIN B SULFATES: 40; 200000 IRRIGANT IRRIGATION at 23:30

## 2018-07-01 RX ADMIN — Medication 10 ML: at 20:23

## 2018-07-01 ASSESSMENT — PAIN SCALES - GENERAL
PAINLEVEL_OUTOF10: 2
PAINLEVEL_OUTOF10: 0
PAINLEVEL_OUTOF10: 0
PAINLEVEL_OUTOF10: 2
PAINLEVEL_OUTOF10: 5
PAINLEVEL_OUTOF10: 5
PAINLEVEL_OUTOF10: 8

## 2018-07-01 NOTE — PROGRESS NOTES
During physical therapy session pt c/o legs feeling \"crampy\" during exercises. States when this occurs at home he normally takes an additional magnesium pill.  Nurse to speak with Dr. Neva Saunders regarding pt request.

## 2018-07-01 NOTE — PROGRESS NOTES
Dr. Gerhard Bagley calls in for update on pt. Update on leg cramps and verbal order to administer extra dose of magnesium. See MAR. Dr. Gerhard Bagley instructs nurse to hold the 1200 dressing change to LLE for when he rounds.

## 2018-07-01 NOTE — PROGRESS NOTES
Phone: [place]  Date: 2018  Fax: [phone]      Physical Therapy    Daily Note    Patient Name: Jani Krabbe      : 1930  (80 y.o.)  MRN: [id number]     [] Patient requires additional Physical Therapy    [x] Anticipate Physical Therapy Discharge Soon     Assessment    Sit to Stand: Modified independent  Stand to sit: Modified independent  Bed to Chair: Modified independent             Ambulation 1  Surface: level tile  Device: Rolling Walker  Assistance: Modified Independent  Quality of Gait: steady  Distance: 75 ft x1  Comments: therapy room to patient room        Stairs  # Steps : 5 (x2 laps)  Stairs Height: 4\" (and 6\")  Rails: Bilateral  Assistance: Modified independent   Comment: ---    Assessment: Patient in therapy room after working [employer] OT. Agrees to stay and work with PTA following. Completes seated exercise per doc flowsheet. Able to ascend steps 4\" and 6\" in height with foot over foot pattern. Descends steps of same height with step to pattern. He has no difficulty today ascending 6' steps due to knee pain as he did yesterday. Session shortened slightly due to patient needing to have a BM. Returns to room and into bathroom to sit on commode. Call light in reach and nurse notified.      Safety Devices  Type of devices: Call light within reach, Gait belt, Nurse notified          Time In: 0935  Time Out: 1016  Timed Coded Minutes: 41  Total Treatment Time: 41  Exercises:  See Flowsheets    Plan  Cont Per Plan Of Care    Goals  Short Term Goals  Time Frame for Short term goals: 1 week  Short term goal 1: Patient to have increase strength left knee extension 4+/5 to transfer sit to stand modified independent-MET  Short term goal 2: Patient to walk 100 ftx2 with wheeled walker and SBA-MET  Short term goal 3: Patient to have increase strength left hip flexion 4/5 to transfer sit to supine independently-MET          Long Term Goals  Time Frame for Long term goals : 2 weeks  Long term goal 1: Patient to have improved dynamic balance to good in order to complete ADLs safely  Long term goal 2: Patient to walk with least restrictive device 150 ft x2 modified independent  Long term goal 3: Patient to ambulate up and down 3 stair steps with handrail independently          [address] Number: PTA    Date: 7/1/2018

## 2018-07-01 NOTE — PROGRESS NOTES
[facility]  [facility] Therapy  Daily Note  Date: 2018  Patient Name: Anabela Hernandez        MRN: [id number]    : 1930  (80 y.o.)    Subjective: Patient is supine in bed upon arrival.     Objective  ADL     Feeding: Independent  Grooming: Independent  UE Bathing: Setup  LE Bathing: Setup  UE Dressing: Setup  LE Dressing: Setup     Supine to Sit: Supervision  Sit to Supine: Supervision     Sit to stand: Stand by assistance  Stand to sit: Stand by assistance       Assessment  Assessment: Tolerated treatment session well on this date. Completed standing sponge bath with set up only, demonstrated good balance/tolerance. Ambulates to treatment room to continue with exercies. Noted slight SOB with exercises, required short rest breaks between each exercise. Remains in treatment room to continue with PT. Prognosis: Good  Discharge Recommendations: Continue to assess pending progress                Exercises:  See Flowsheets    Goals  Short Term Goals  Time Frame for Short term goals: 1 week (18)  Short term goal 1: Patient to tolerate static standing x 5 minutes in order to complete self care activities. -MET  Short term goal 2: Patient to complete LB bathing and dressing with set up only - MET  Short term goal 3: Patient to complete toileting task and hygiene with supervision. - MET        Long Term Goals  Time Frame for Long term goals : 2 weeks (-)  Long term goal 1: Patient to complete UB/LB bathing independently. Long term goal 2: Patient to complete UB/LB dressing independently. Long term goal 3: Patient to tolerate static stnading x7 minutes in order to complete laundry tasks. -MET  Long term goal 4: Patient to complete all toileting tasks and hygiene independently. Long term goal 5: Patient to demonstrate independence with simple meal prep activity in order to return home independently.      Timed Code Treatment Minutes: 40 Minutes     Time In: 9249  Time Out: 0935  Timed

## 2018-07-01 NOTE — PROGRESS NOTES
Pod #11          Subjective: Patient reports increased itching in toes and bottom of feet. Pain, however , he reports is slightly less. Reports good bowel movement this day and no serious worsening of voiding. Objective: Chest slow expiration no rales no rhonchi card rrr weight up slightly from yesterday. I change dressing and do wound care. Dressings taken down to skin level and wound debrided bluntly of debris and dead skin. Interval photographs taken. Wounds plantar surface foot and between toes and along sides up to two finger breads up on all sides from plantar surface treated with corn huskers lotion. Rest of leg treated with medicated soaked gauze held in place by Kerlix then flex net. Patient eating well and bowels have worked. Denies chest pains denies shortness of breath. Rash in groin reduced somewhat. Assessment: polymicrobial cellulitis left lower leg with circumferential partial thickness skin loss and ischemia left lower leg with PTCA intervention. Plan: all of the patient's questions answered. Will continue successful round the clock intensive wound care.           Jose Nobles MD

## 2018-07-02 PROCEDURE — 97535 SELF CARE MNGMENT TRAINING: CPT

## 2018-07-02 PROCEDURE — 97110 THERAPEUTIC EXERCISES: CPT

## 2018-07-02 PROCEDURE — 2500000003 HC RX 250 WO HCPCS: Performed by: SURGERY

## 2018-07-02 PROCEDURE — 6370000000 HC RX 637 (ALT 250 FOR IP): Performed by: SURGERY

## 2018-07-02 PROCEDURE — 2580000003 HC RX 258: Performed by: SURGERY

## 2018-07-02 PROCEDURE — 1200000002 HC SEMI PRIVATE SWING BED

## 2018-07-02 PROCEDURE — 6360000002 HC RX W HCPCS: Performed by: SURGERY

## 2018-07-02 PROCEDURE — 97530 THERAPEUTIC ACTIVITIES: CPT

## 2018-07-02 RX ADMIN — Medication 500 MG: at 11:47

## 2018-07-02 RX ADMIN — NEOMYCIN AND POLYMYXIN B SULFATES: 40; 200000 IRRIGANT IRRIGATION at 11:44

## 2018-07-02 RX ADMIN — Medication 10 ML: at 08:39

## 2018-07-02 RX ADMIN — HYDROXYZINE PAMOATE 25 MG: 25 CAPSULE ORAL at 11:43

## 2018-07-02 RX ADMIN — CIPROFLOXACIN 400 MG: 2 INJECTION, SOLUTION INTRAVENOUS at 23:30

## 2018-07-02 RX ADMIN — DOCUSATE SODIUM 100 MG: 100 CAPSULE, LIQUID FILLED ORAL at 08:39

## 2018-07-02 RX ADMIN — HYDROXYZINE PAMOATE 25 MG: 25 CAPSULE ORAL at 19:53

## 2018-07-02 RX ADMIN — MICONAZOLE NITRATE: 2 POWDER TOPICAL at 08:39

## 2018-07-02 RX ADMIN — Medication: at 17:47

## 2018-07-02 RX ADMIN — TAMSULOSIN HYDROCHLORIDE 0.4 MG: 0.4 CAPSULE ORAL at 08:39

## 2018-07-02 RX ADMIN — CLOPIDOGREL BISULFATE 75 MG: 75 TABLET ORAL at 08:39

## 2018-07-02 RX ADMIN — CIPROFLOXACIN 400 MG: 2 INJECTION, SOLUTION INTRAVENOUS at 11:43

## 2018-07-02 RX ADMIN — Medication: at 11:44

## 2018-07-02 RX ADMIN — NEOMYCIN AND POLYMYXIN B SULFATES: 40; 200000 IRRIGANT IRRIGATION at 23:46

## 2018-07-02 RX ADMIN — Medication: at 23:46

## 2018-07-02 RX ADMIN — Medication 10 ML: at 21:51

## 2018-07-02 RX ADMIN — HYDROCODONE BITARTRATE AND ACETAMINOPHEN 1 TABLET: 5; 325 TABLET ORAL at 19:54

## 2018-07-02 RX ADMIN — TAMSULOSIN HYDROCHLORIDE 0.4 MG: 0.4 CAPSULE ORAL at 21:51

## 2018-07-02 RX ADMIN — HYDROXYZINE PAMOATE 25 MG: 25 CAPSULE ORAL at 07:44

## 2018-07-02 RX ADMIN — MICONAZOLE NITRATE: 2 POWDER TOPICAL at 21:52

## 2018-07-02 RX ADMIN — Medication: at 06:46

## 2018-07-02 RX ADMIN — NEOMYCIN AND POLYMYXIN B SULFATES: 40; 200000 IRRIGANT IRRIGATION at 06:46

## 2018-07-02 RX ADMIN — Medication 10 ML: at 11:44

## 2018-07-02 RX ADMIN — NEOMYCIN AND POLYMYXIN B SULFATES: 40; 200000 IRRIGANT IRRIGATION at 17:47

## 2018-07-02 ASSESSMENT — PAIN SCALES - GENERAL
PAINLEVEL_OUTOF10: 0
PAINLEVEL_OUTOF10: 5

## 2018-07-02 NOTE — PROGRESS NOTES
Dr. Quique Anderson notified of 8/10 pain to LLE unrelieved by norco. Pedal pulses present. Positive cap refill. States he will be in to evaluate patient himself.

## 2018-07-02 NOTE — PROGRESS NOTES
Pod #12          Subjective: Patient reports pain is way down today. His only complaint is itching. He notes vistaril is good at relieving his itching. He has not showered since admission and we will arrange for that. He reports he has a walker at home. Objective: chest slow expiration no rales no rhonchi card rrr abdomen soft normal bowel sounds no peritoneal signs. Bowels are working no diarrhea. I do dressing change. Dressings taken down to skin level. Leg debrided and cleansed. Foot covered with corn huskers lotion. Leg is dressed with antibiotic dampened gauze. New Kerlix placed and fishnet placed. Capillary filling in toes brisk at less than 2 seconds and all of toes and foot and lower leg is warm. Patient tolerance excellent. Assessment: polymicrobial cellulitis with multiple areas of extensive skin breakdown (staph aureus, proteus, pseudomonas) superimposed on arterial ischemic leg now sp angioplasty. Plan: continue pt ot  Continue currently successful intensive round the clock wound care. All of the patient's questions answered.     Gerda Tello MD

## 2018-07-02 NOTE — PROGRESS NOTES
goal 3: Patient to have increase strength left hip flexion 4/5 to transfer sit to supine independently-MET          Long Term Goals  Time Frame for Long term goals : 2 weeks  Long term goal 1: Patient to have improved dynamic balance to good in order to complete ADLs safely  Long term goal 2: Patient to walk with least restrictive device 150 ft x2 modified independent  Long term goal 3: Patient to ambulate up and down 3 stair steps with handrail independently          [address] Number: PTA    Date: 7/2/2018

## 2018-07-02 NOTE — PROGRESS NOTES
Subjective: Called to see patient for 8 out of ten pain in left leg. Objective: Dressings taken down to skin level. Point of pain is mid anterior-anterolateral shin. Doppler examination with external speaker on reveals arterial signals dp and pt and popliteal and in femoral adductor canal.  Capillary filling in toes is less than 2 seconds and toes are all warm. Patient says\" this is not like when before I went to [place],\" in answer to my question as to if this is like when you went to [facility]. Dressing I taken down was the dressing I put on. It has dried and replacement medicinally dampened dressing soothes some of the pain. Good venous souffle on testing with respiratory inhibition and venous augmentation to compression of calf. Assessment: pain in mid anterior and anterolateral shin excepting oral pain med. Plan: one time dose of parenteral med.

## 2018-07-03 PROCEDURE — 97530 THERAPEUTIC ACTIVITIES: CPT

## 2018-07-03 PROCEDURE — 6370000000 HC RX 637 (ALT 250 FOR IP): Performed by: SURGERY

## 2018-07-03 PROCEDURE — 97116 GAIT TRAINING THERAPY: CPT

## 2018-07-03 PROCEDURE — 97110 THERAPEUTIC EXERCISES: CPT

## 2018-07-03 PROCEDURE — 1200000002 HC SEMI PRIVATE SWING BED

## 2018-07-03 PROCEDURE — 2580000003 HC RX 258: Performed by: SURGERY

## 2018-07-03 PROCEDURE — 97535 SELF CARE MNGMENT TRAINING: CPT

## 2018-07-03 PROCEDURE — 6360000002 HC RX W HCPCS: Performed by: SURGERY

## 2018-07-03 PROCEDURE — 2500000003 HC RX 250 WO HCPCS: Performed by: SURGERY

## 2018-07-03 RX ADMIN — DOCUSATE SODIUM 100 MG: 100 CAPSULE, LIQUID FILLED ORAL at 08:41

## 2018-07-03 RX ADMIN — Medication: at 17:49

## 2018-07-03 RX ADMIN — Medication 10 ML: at 11:20

## 2018-07-03 RX ADMIN — CLOPIDOGREL BISULFATE 75 MG: 75 TABLET ORAL at 08:41

## 2018-07-03 RX ADMIN — HYDROXYZINE PAMOATE 25 MG: 25 CAPSULE ORAL at 12:35

## 2018-07-03 RX ADMIN — NEOMYCIN AND POLYMYXIN B SULFATES: 40; 200000 IRRIGANT IRRIGATION at 23:35

## 2018-07-03 RX ADMIN — TAMSULOSIN HYDROCHLORIDE 0.4 MG: 0.4 CAPSULE ORAL at 20:06

## 2018-07-03 RX ADMIN — HYDROCODONE BITARTRATE AND ACETAMINOPHEN 1 TABLET: 5; 325 TABLET ORAL at 04:16

## 2018-07-03 RX ADMIN — Medication 500 MG: at 08:43

## 2018-07-03 RX ADMIN — HYDROCODONE BITARTRATE AND ACETAMINOPHEN 1 TABLET: 5; 325 TABLET ORAL at 14:43

## 2018-07-03 RX ADMIN — Medication 10 ML: at 20:06

## 2018-07-03 RX ADMIN — Medication: at 23:35

## 2018-07-03 RX ADMIN — CIPROFLOXACIN 400 MG: 2 INJECTION, SOLUTION INTRAVENOUS at 23:35

## 2018-07-03 RX ADMIN — NEOMYCIN AND POLYMYXIN B SULFATES: 40; 200000 IRRIGANT IRRIGATION at 12:00

## 2018-07-03 RX ADMIN — HYDROCODONE BITARTRATE AND ACETAMINOPHEN 1 TABLET: 5; 325 TABLET ORAL at 22:12

## 2018-07-03 RX ADMIN — TAMSULOSIN HYDROCHLORIDE 0.4 MG: 0.4 CAPSULE ORAL at 08:41

## 2018-07-03 RX ADMIN — Medication: at 05:57

## 2018-07-03 RX ADMIN — NEOMYCIN AND POLYMYXIN B SULFATES: 40; 200000 IRRIGANT IRRIGATION at 05:57

## 2018-07-03 RX ADMIN — MICONAZOLE NITRATE: 2 POWDER TOPICAL at 20:11

## 2018-07-03 RX ADMIN — HYDROXYZINE PAMOATE 25 MG: 25 CAPSULE ORAL at 04:16

## 2018-07-03 RX ADMIN — Medication: at 12:00

## 2018-07-03 RX ADMIN — NEOMYCIN AND POLYMYXIN B SULFATES: 40; 200000 IRRIGANT IRRIGATION at 17:50

## 2018-07-03 RX ADMIN — Medication 10 ML: at 12:35

## 2018-07-03 RX ADMIN — MICONAZOLE NITRATE: 2 POWDER TOPICAL at 08:42

## 2018-07-03 RX ADMIN — CIPROFLOXACIN 400 MG: 2 INJECTION, SOLUTION INTRAVENOUS at 11:20

## 2018-07-03 ASSESSMENT — PAIN DESCRIPTION - PAIN TYPE: TYPE: CHRONIC PAIN

## 2018-07-03 ASSESSMENT — PAIN DESCRIPTION - ORIENTATION: ORIENTATION: LEFT

## 2018-07-03 ASSESSMENT — PAIN DESCRIPTION - LOCATION: LOCATION: LEG

## 2018-07-03 ASSESSMENT — PAIN SCALES - GENERAL
PAINLEVEL_OUTOF10: 5
PAINLEVEL_OUTOF10: 3
PAINLEVEL_OUTOF10: 2
PAINLEVEL_OUTOF10: 4
PAINLEVEL_OUTOF10: 0
PAINLEVEL_OUTOF10: 5
PAINLEVEL_OUTOF10: 2

## 2018-07-03 NOTE — PROGRESS NOTES
HOSP GENERAL [facility]  Swing Bed Interdisciplinary Care Plan Conference Report   Recertification for Continued Skilled Care. Patients name:Alden Justin  Date of Conference: 7/3/2018    The Following Information was discussed and agreed upon with the patient and/or Caregivers as listed below.     Names of Team Members and Caregivers present for meeting:  : AUGIE Moses  Nursing:   Therapy: , PT; KENYON John/kelsea OTR  Dietician:   Activities: Eliazar Baum  Pastoral Care: Sr Raphael Lozada  Caregivers:    [] Spouse  [] Child/Children [] Significant Other   [] Other:     Nutrition:  Current Body Weight:   Wt Readings from Last 1 Encounters:   07/03/18 213 lb 11.2 oz (96.9 kg)     Weight Change: Stable  Current Diet order:DIET GENERAL;  Dietary Nutrition Supplements: Frozen Oral Supplement  Intakes: %  Diet Education Provided: none    Spiritual:  Orthodoxy needs met: [x] Yes  [] No  [] N/A  Notified Jaime Sevilla or Brijesh of admission: [] Yes  [] No  [x] N/A  Patient added to Communion List: [] Yes  [] No  [x] N/A  Any other concerns or comments:     Occupational Therapy:  Current ADL Status: ADL  Feeding: Independent  Grooming: Independent  UE Bathing: Setup  LE Bathing: Setup  UE Dressing: Setup  LE Dressing: Setup  Toileting: Modified independent   Safety: Good  Recommendations for adaptive equipment:   [] Long handled sponge   [] Long Handled Shoe [place]  [] Extended Tub Bench    Physical Therapy:  Transfers:   Transfers  Sit to Stand: Modified independent  Stand to sit: Modified independent  Bed to Chair: Modified independent  Mobility/Ambulation:   Ambulation 1  Surface: level tile  Device: Rolling Walker  Assistance: Modified Independent  Quality of Gait: steady  Distance: 75 ft x1  Comments: therapy room to patient room  Equipment:   [x] Rolling Walker   [] Straight [place]  [] Standard Walker  Safety: Good    Speech Therapy:     Respiratory Therapy:     Nursing:  Skin/Wound/Incisions:  Skin Color/Condition  Skin Color: Appropriate for ethnicity  Skin Condition/Temp: Warm, Dry  Skin Integrity  Skin Integrity: Other (Comment) (wound)  Location: LLE  Preventative Dressing: Yes  Date Applied: 07/03/18  Assessed this shift?: Yes  Skin Fold Management: Yes  Dressing Site:  (LLE)  Treatment: Pharmaceutical  Multiple Skin Integrity Sites: Yes  Skin Integrity Site 2  Skin Integrity Location 2: Bruising  Location 2: right hip/thigh  Skin Integrity Site 3  Skin Integrity Location 3: Redness (dry, flaky)   Location 3: BLE     Pain Control: norco  New Medications since admission to Swing Bed:   Anticoagulants:     Activities: Activity as tolerated, reads, computer, vistors    :  Plan for Discharge: Possible D/C home at the end of the week. Follow Up/Services needed: possible home care for wound care    Continued skilled needs: PT/OT IV meds, wound care  Skilled Services are for the ongoing condition for which the individual received inpatient care in a hospital.    Physician signature certifies patient continued need for SNF inpatient care.

## 2018-07-03 NOTE — PROGRESS NOTES
Patient remains awake, states he's \"just anxious\" and that he wants his \"pipe and Vivian Floor. \" Chews entire pack of gum in two hours. This nurse offers to contact Dr. Bobo Cobos d/t ongoing anxiety , patient declines.

## 2018-07-03 NOTE — PROGRESS NOTES
Phone: [place]  Date: 7/3/2018  Fax: [phone]      Physical Therapy    Daily Note    Patient Name: Sukumar Del Real      : 1930  (80 y.o.)  MRN: [id number]     [x] Patient requires additional Physical Therapy    [] Anticipate Physical Therapy Discharge Soon     Assessment    Sit to Stand: Modified independent  Stand to sit: Modified independent  Bed to Chair: Modified independent             Ambulation 1  Surface: level tile  Device: Rolling Walker  Assistance: Modified Independent  Quality of Gait: steady  Distance: 75 ft x2  Comments: patient room to therapy room back to patient room        Stairs  # Steps : 5 (x2 laps)  Stairs Height: 4\" (and 6\")  Rails: Bilateral  Assistance: Modified independent   Comment: ---    Assessment: Patient in chair upon arrival.  Sit to stand from chair is mod I.  Ambulates to therapy  room with wheeled walker without LOB. Completes exercise in sitting and in standing to work on impoving strength and balance. Up/down steps with B handrails and supervision/mod I. He is able to ascend steps with foot over foot pattern but when he descends steps he does so with foot to foot pattern. Returns to room following session to sit up in chair. Call light in reach.      Safety Devices  Type of devices: Call light within reach, Left in chair, Nurse notified          Time In: 923  Time Out: 1003  Timed Coded Minutes: 40  Total Treatment Time: 40    Exercises:  See Flowsheets    Plan  Cont Per Plan Of Care    Goals  Short Term Goals  Time Frame for Short term goals: 1 week  Short term goal 1: Patient to have increase strength left knee extension 4+/5 to transfer sit to stand modified independent-MET  Short term goal 2: Patient to walk 100 ftx2 with wheeled walker and SBA-MET  Short term goal 3: Patient to have increase strength left hip flexion 4/5 to transfer sit to supine independently-MET          Long Term Goals  Time Frame for Long term goals : 2 weeks  Long term goal 1: Patient to have improved dynamic balance to good in order to complete ADLs safely  Long term goal 2: Patient to walk with least restrictive device 150 ft x2 modified independent  Long term goal 3: Patient to ambulate up and down 3 stair steps with handrail independently          [address] Number: PTA    Date: 7/3/2018

## 2018-07-03 NOTE — PROGRESS NOTES
HOSP GENERAL [facility]  Occupational Therapy  Daily Note  Date: 7/3/2018  Patient Name: Anthony Singh        MRN: [id number]    : 1930  (80 y.o.)    Subjective: Pt in chair upon arrival      Assessment  Assessment: Pt in chair upon arrival. Agreeable to OT this afternoon. Ambulates to nurses station kitchen to retrieve items for meal preparation ([employer] sandwich). Pt retrieves items w/ Mod I and good walker safety, returns to therapy room and makes self sandwich w/ Mod I. Washes dishes Mod I. Completes REJI UB exercises & activity w/o SOB and good tolerance. Completes standing activivty w/o LOB and good safety awareness. Returns to room w/ FWW & Mod I, sit in chair & remains there w/ call light in reach and all needs met. Anticipate d/c OT soon. Prognosis: Good  Discharge Recommendations: Continue to assess pending progress      Goals  Short Term Goals  Time Frame for Short term goals: 1 week (18)  Short term goal 1: Patient to tolerate static standing x 5 minutes in order to complete self care activities. -MET  Short term goal 2: Patient to complete LB bathing and dressing with set up only - MET  Short term goal 3: Patient to complete toileting task and hygiene with supervision. - MET        Long Term Goals  Time Frame for Long term goals : 2 weeks (18)  Long term goal 1: Patient to complete UB/LB bathing independently. Long term goal 2: Patient to complete UB/LB dressing independently. Long term goal 3: Patient to tolerate static stnading x7 minutes in order to complete laundry tasks. -MET  Long term goal 4: Patient to complete all toileting tasks and hygiene independently. - MET  Long term goal 5: Patient to demonstrate independence with simple meal prep activity in order to return home independently.  -MET       Time In: 2730  Time Out: 1350  Timed Coded Minutes: 51  Total Treatment Time: [address] [facility]   JOSEMANUEL, OTR/L  Date: 7/3/2018

## 2018-07-03 NOTE — PROGRESS NOTES
Nutrition Assessment    Type and Reason for Visit: Reassess    Nutrition Recommendations:   1. Continue current diet. 2. Continue magic cups with meals. Nutrition Assessment:  · Subjective Assessment: None. · Nutrition-Focused Physical Findings: orbital and temporal wasting  · Wound Type: Open Wounds  · Current Nutrition Therapies:  · Oral Diet Orders: General   · Oral Diet intake: %  · Oral Nutrition Supplement (ONS) Orders: Frozen Oral Supplement (magic cup TID with meals)  · ONS intake: %  · Anthropometric Measures:  · Ht: 5' 9\" (175.3 cm)   · Current Body Wt: 213 lb 11.2 oz (96.9 kg)  · Admission Body Wt: 213 lb 6.4 oz (96.8 kg)  · Usual Body Wt: 226 lb (102.5 kg)  · % Weight Change: 5.6% loss,  recently, and intentional r/t fluid retention decreases  · Ideal Body Wt: 160 lb (72.6 kg), % Ideal Body 133%  · Adjusted Body Wt: 173 lb 11.6 oz (78.8 kg), body weight adjusted for Obesity  · BMI Classification: BMI 30.0 - 34.9 Obese Class I  Wt Readings from Last 3 Encounters:   07/03/18 213 lb 11.2 oz (96.9 kg)   08/25/16 198 lb (89.8 kg)      Estimated Intake vs Estimated Needs: Intake Meets Needs    Nutrition Risk Level: Moderate, High  Pt with good intakes of supplements and meals. No new labs. Weights have been stable, range from 208-213 since 6/22. + 2 edema R/L LE. + BM yesterday. Pt with cellulitis wound noted. No MVI with minerals added yet as previously recommended. SBU meeting held today without writer present. F/u as needed.    Nutrition Interventions:   Continue current diet, Continue current ONS  Continued Inpatient Monitoring, Coordination of Care, Education Not Indicated    Nutrition Evaluation:   · Evaluation: Progressing toward goals   · Goals: PO>75% meals and supplements    · Monitoring: Meal Intake, Supplement Intake, Pertinent Labs, Weight, Ascites/Edema, Wound Healing    Electronically signed by Odilia Fine RD, PATRICIA on 7/3/18 at 11:14 AM    Contact Number: [id number]

## 2018-07-03 NOTE — PROGRESS NOTES
Long term goal 2: Patient to complete UB/LB dressing independently. Long term goal 3: Patient to tolerate static stnading x7 minutes in order to complete laundry tasks. -MET  Long term goal 4: Patient to complete all toileting tasks and hygiene independently. - MET  Long term goal 5: Patient to demonstrate independence with simple meal prep activity in order to return home independently.       Time In: 1030  Time Out: 1120  Timed Coded Minutes: 50  Total Treatment Time: [address]   MOT, OTR/L  Date: 7/3/2018

## 2018-07-04 PROCEDURE — 97116 GAIT TRAINING THERAPY: CPT

## 2018-07-04 PROCEDURE — 6370000000 HC RX 637 (ALT 250 FOR IP): Performed by: SURGERY

## 2018-07-04 PROCEDURE — 1200000002 HC SEMI PRIVATE SWING BED

## 2018-07-04 PROCEDURE — 6360000002 HC RX W HCPCS: Performed by: SURGERY

## 2018-07-04 PROCEDURE — 2580000003 HC RX 258: Performed by: SURGERY

## 2018-07-04 PROCEDURE — 2500000003 HC RX 250 WO HCPCS: Performed by: SURGERY

## 2018-07-04 PROCEDURE — 97110 THERAPEUTIC EXERCISES: CPT

## 2018-07-04 RX ORDER — NEOMYCIN AND POLYMYXIN B SULFATES 40; 200000 MG/ML; [USP'U]/ML
SOLUTION IRRIGATION
Status: DISPENSED
Start: 2018-07-04 | End: 2018-07-05

## 2018-07-04 RX ADMIN — NEOMYCIN AND POLYMYXIN B SULFATES: 40; 200000 IRRIGANT IRRIGATION at 12:00

## 2018-07-04 RX ADMIN — Medication: at 12:00

## 2018-07-04 RX ADMIN — HYDROCODONE BITARTRATE AND ACETAMINOPHEN 1 TABLET: 5; 325 TABLET ORAL at 21:45

## 2018-07-04 RX ADMIN — CLOPIDOGREL BISULFATE 75 MG: 75 TABLET ORAL at 09:43

## 2018-07-04 RX ADMIN — Medication: at 06:29

## 2018-07-04 RX ADMIN — Medication 10 ML: at 09:44

## 2018-07-04 RX ADMIN — HYDROXYZINE PAMOATE 25 MG: 25 CAPSULE ORAL at 06:55

## 2018-07-04 RX ADMIN — MICONAZOLE NITRATE: 2 POWDER TOPICAL at 20:08

## 2018-07-04 RX ADMIN — NEOMYCIN AND POLYMYXIN B SULFATES: 40; 200000 IRRIGANT IRRIGATION at 06:29

## 2018-07-04 RX ADMIN — TAMSULOSIN HYDROCHLORIDE 0.4 MG: 0.4 CAPSULE ORAL at 09:43

## 2018-07-04 RX ADMIN — Medication 500 MG: at 09:46

## 2018-07-04 RX ADMIN — Medication 10 ML: at 20:08

## 2018-07-04 RX ADMIN — Medication: at 18:34

## 2018-07-04 RX ADMIN — NEOMYCIN AND POLYMYXIN B SULFATES: 40; 200000 IRRIGANT IRRIGATION at 18:34

## 2018-07-04 RX ADMIN — CIPROFLOXACIN 400 MG: 2 INJECTION, SOLUTION INTRAVENOUS at 11:24

## 2018-07-04 RX ADMIN — NEOMYCIN AND POLYMYXIN B SULFATES: 40; 200000 IRRIGANT IRRIGATION at 22:55

## 2018-07-04 RX ADMIN — Medication: at 23:06

## 2018-07-04 RX ADMIN — Medication 10 ML: at 23:59

## 2018-07-04 RX ADMIN — DOCUSATE SODIUM 100 MG: 100 CAPSULE, LIQUID FILLED ORAL at 09:43

## 2018-07-04 RX ADMIN — MICONAZOLE NITRATE: 2 POWDER TOPICAL at 09:46

## 2018-07-04 RX ADMIN — CIPROFLOXACIN 400 MG: 2 INJECTION, SOLUTION INTRAVENOUS at 22:55

## 2018-07-04 RX ADMIN — HYDROXYZINE PAMOATE 25 MG: 25 CAPSULE ORAL at 18:36

## 2018-07-04 RX ADMIN — HYDROCODONE BITARTRATE AND ACETAMINOPHEN 1 TABLET: 5; 325 TABLET ORAL at 06:43

## 2018-07-04 RX ADMIN — TAMSULOSIN HYDROCHLORIDE 0.4 MG: 0.4 CAPSULE ORAL at 20:09

## 2018-07-04 ASSESSMENT — PAIN SCALES - GENERAL
PAINLEVEL_OUTOF10: 2
PAINLEVEL_OUTOF10: 2
PAINLEVEL_OUTOF10: 5
PAINLEVEL_OUTOF10: 4
PAINLEVEL_OUTOF10: 0
PAINLEVEL_OUTOF10: 0

## 2018-07-04 NOTE — PROGRESS NOTES
Phone: [place]  Date: 2018  Fax: [phone]      Physical Therapy    Daily Note    Patient Name: Serg November      : 1930  (80 y.o.)  MRN: [id number]     [] Patient requires additional Physical Therapy    [] Anticipate Physical Therapy Discharge Soon     Assessment  Bridging: Supervision     Supine to Sit: Supervision  Sit to Supine: Modified independent  Scooting: Supervision    Sit to Stand: Modified independent  Stand to sit: Modified independent  Bed to Chair: Modified independent             Ambulation 1  Surface: level tile  Device: Rolling Walker  Assistance: Modified Independent  Quality of Gait: steady  Distance: 75 ft x2  Comments: patient room to therapy room back to patient room        Stairs  # Steps : 5 (x2 laps)  Stairs Height: 4\" (and 6\")  Rails: Bilateral  Assistance: Modified independent   Comment: ---    Assessment: Patient transfering sit to stand modified independent. Worked on gait training to build endurance, patient was able to walk 150 ft x2 with w.walker modified independent. Patient completed ex per Doc flow to build balance and strength. Gait training up and down stairs with one handrail only, requires SBA, patient had LOB x1 and reported he got dizzy jsut for a minute due to turning around to fast.  Plan to work toward independence on stairs with one handrail, as patient reports only one handrail at home.      Safety Devices  Type of devices: Call light within reach, Left in bed, Nurse notified          Time In: 10:16  Time Out: 10:56  Timed Coded Minutes: 40  Total Treatment Time: 40    Exercises:  See Flowsheets    Plan  Cont Per Plan Of Care    Goals  Short Term Goals  Time Frame for Short term goals: 1 week  Short term goal 1: Patient to have increase strength left knee extension 4+/5 to transfer sit to stand modified independent-MET  Short term goal 2: Patient to walk 100 ftx2 with wheeled walker and SBA-MET  Short term goal 3: Patient to

## 2018-07-04 NOTE — PLAN OF CARE
Problem: DAILY CARE  Goal: Daily care needs are met  Outcome: Met This Shift
Problem: PAIN  Goal: Patient's pain/discomfort is manageable  Outcome: Met This Shift  Takes norco for pain before dressing changes which is managing his pain
Problem: PAIN  Goal: Patient's pain/discomfort is manageable  Outcome: Ongoing  Dr. David Malagon orders pain medication after initial wound care.
Problem: PAIN  Goal: Patient's pain/discomfort is manageable  Outcome: Ongoing  Pt relates that pain is less than when was first admitted.
Problem: PAIN  Goal: Patient's pain/discomfort is manageable  Outcome: Ongoing  Pt takes pain medication prior to wound care.
Problem: PAIN  Goal: Patient's pain/discomfort is manageable  Outcome: Ongoing  Takes pain medication prior to dressing change.
Problem: Pain:  Goal: Control of acute pain  Control of acute pain   Outcome: Met This Shift      Problem: SAFETY  Goal: Free from accidental physical injury  Outcome: Met This Shift      Problem: DAILY CARE  Goal: Daily care needs are met  Outcome: Met This Shift
Problem: SAFETY  Goal: Free from accidental physical injury  Outcome: Met This Shift
Problem: SAFETY  Goal: Free from accidental physical injury  Outcome: Met This Shift      Problem: DAILY CARE  Goal: Daily care needs are met  Outcome: Met This Shift      Problem: PAIN  Goal: Patient's pain/discomfort is manageable  Outcome: Met This Shift  Will continue to monitor.
3: Patient to have increase strength left hip flexion 4/5 to transfer sit to supine independently    Long term goals  Time Frame for Long term goals : 2 weeks  Long term goal 1: Patient to have improved dynamic balance to good in order to complete ADLs safely  Long term goal 2: Patient to walk with least restrictive device 150 ft x2 modified independent  Long term goal 3: Patient to ambulate up and down 3 stair steps with handrail independently    Rolly Benson, PT   Date: 6/23/2018
tolerate static standing x 5 minutes in order to complete self care activities. Short term goal 2: Patient to complete LB bathing and dressing with set up only   Short term goal 3: Patient to complete toileting task and hygiene with supervision. Long term goals  Time Frame for Long term goals : 2 weeks (7-9-18)  Long term goal 1: Patient to complete UB/LB bathing independently. Long term goal 2: Patient to complete UB/LB dressing independently. Long term goal 3: Patient to tolerate static stnading x7 minutes in order to complete laundry tasks. Long term goal 4: Patient to complete all toileting tasks and hygiene independently. Long term goal 5: Patient to demonstrate independence with simple meal prep activity in order to return home independently.        Indira Beltrán, OTR/L                    Date: 6/25/2018

## 2018-07-04 NOTE — PROGRESS NOTES
Patient resting quietly with eyes closed. Respirations even and unlabored. Call light within reach and bed in lowest locked position.

## 2018-07-05 PROCEDURE — 97116 GAIT TRAINING THERAPY: CPT

## 2018-07-05 PROCEDURE — 2580000003 HC RX 258: Performed by: SURGERY

## 2018-07-05 PROCEDURE — 97110 THERAPEUTIC EXERCISES: CPT

## 2018-07-05 PROCEDURE — 6370000000 HC RX 637 (ALT 250 FOR IP): Performed by: SURGERY

## 2018-07-05 PROCEDURE — 2500000003 HC RX 250 WO HCPCS: Performed by: SURGERY

## 2018-07-05 PROCEDURE — 6360000002 HC RX W HCPCS: Performed by: SURGERY

## 2018-07-05 PROCEDURE — 1200000002 HC SEMI PRIVATE SWING BED

## 2018-07-05 PROCEDURE — 97530 THERAPEUTIC ACTIVITIES: CPT

## 2018-07-05 RX ADMIN — NEOMYCIN AND POLYMYXIN B SULFATES: 40; 200000 IRRIGANT IRRIGATION at 11:52

## 2018-07-05 RX ADMIN — Medication 10 ML: at 22:52

## 2018-07-05 RX ADMIN — NEOMYCIN AND POLYMYXIN B SULFATES: 40; 200000 IRRIGANT IRRIGATION at 18:57

## 2018-07-05 RX ADMIN — TAMSULOSIN HYDROCHLORIDE 0.4 MG: 0.4 CAPSULE ORAL at 22:52

## 2018-07-05 RX ADMIN — TAMSULOSIN HYDROCHLORIDE 0.4 MG: 0.4 CAPSULE ORAL at 08:34

## 2018-07-05 RX ADMIN — Medication: at 18:57

## 2018-07-05 RX ADMIN — Medication 10 ML: at 11:53

## 2018-07-05 RX ADMIN — CIPROFLOXACIN 400 MG: 2 INJECTION, SOLUTION INTRAVENOUS at 22:52

## 2018-07-05 RX ADMIN — Medication 500 MG: at 10:51

## 2018-07-05 RX ADMIN — HYDROXYZINE PAMOATE 25 MG: 25 CAPSULE ORAL at 22:52

## 2018-07-05 RX ADMIN — MICONAZOLE NITRATE: 2 POWDER TOPICAL at 22:53

## 2018-07-05 RX ADMIN — CIPROFLOXACIN 400 MG: 2 INJECTION, SOLUTION INTRAVENOUS at 10:50

## 2018-07-05 RX ADMIN — MICONAZOLE NITRATE: 2 POWDER TOPICAL at 08:35

## 2018-07-05 RX ADMIN — CLOPIDOGREL BISULFATE 75 MG: 75 TABLET ORAL at 08:35

## 2018-07-05 RX ADMIN — Medication: at 22:54

## 2018-07-05 RX ADMIN — Medication: at 11:52

## 2018-07-05 RX ADMIN — HYDROCODONE BITARTRATE AND ACETAMINOPHEN 1 TABLET: 5; 325 TABLET ORAL at 16:19

## 2018-07-05 RX ADMIN — NEOMYCIN AND POLYMYXIN B SULFATES: 40; 200000 IRRIGANT IRRIGATION at 05:52

## 2018-07-05 RX ADMIN — NEOMYCIN AND POLYMYXIN B SULFATES: 40; 200000 IRRIGANT IRRIGATION at 22:53

## 2018-07-05 RX ADMIN — HYDROXYZINE PAMOATE 25 MG: 25 CAPSULE ORAL at 11:52

## 2018-07-05 RX ADMIN — HYDROXYZINE PAMOATE 25 MG: 25 CAPSULE ORAL at 04:10

## 2018-07-05 RX ADMIN — HYDROCODONE BITARTRATE AND ACETAMINOPHEN 1 TABLET: 5; 325 TABLET ORAL at 22:52

## 2018-07-05 RX ADMIN — Medication: at 06:01

## 2018-07-05 ASSESSMENT — PAIN SCALES - GENERAL
PAINLEVEL_OUTOF10: 0
PAINLEVEL_OUTOF10: 5
PAINLEVEL_OUTOF10: 2
PAINLEVEL_OUTOF10: 5
PAINLEVEL_OUTOF10: 5

## 2018-07-05 ASSESSMENT — PAIN DESCRIPTION - PROGRESSION: CLINICAL_PROGRESSION: GRADUALLY IMPROVING

## 2018-07-05 ASSESSMENT — PAIN DESCRIPTION - PAIN TYPE: TYPE: CHRONIC PAIN

## 2018-07-05 ASSESSMENT — PAIN DESCRIPTION - LOCATION: LOCATION: HIP

## 2018-07-05 NOTE — PROGRESS NOTES
[facility]  Occupational Therapy  Daily Note  Date: 2018  Patient Name: Johnathan Gant        MRN: [id number]    : 1930  (80 y.o.)    Subjective: Pt supine in bed upon arrival.    Objective  ADL     Toileting: Modified independent         Sit to stand: Modified independent  Stand to sit: Modified independent        Balance  Sitting Balance: Independent  Standing Balance: Supervision  Standing Balance  Time: 10 minutes  Activity: B UE bike (unsupported)  Sit to stand: Modified independent  Stand to sit: Modified independent    Assessment  Assessment: Pt in bed upon arrival. Declines ADL session this morning, requests to complete therapeutic exercises instead. Engages in B UE strengthening/endurance task with arm bike in standing position x10 min. Pt tolerates well with no LOB and supervision only from therapist. Pt requires to use BR during tx session; ambulates back to room with supervision and completes toileting tasks with modified independence. Returns to therapy room to continue session. Engages in additional exercise with 7# ball with minimal SOB noted. Able to complete dynamic standing balance with unilateral support with supervision only. Returns to room and to bed per request. Pt to be independent in room with FWW and use of call light as needed at this time per discussion with OTR. Prognosis: Good  Discharge Recommendations: Continue to assess pending progress       Exercises:  See Flowsheets    Goals  Short Term Goals  Time Frame for Short term goals: 1 week (18)  Short term goal 1: Patient to tolerate static standing x 5 minutes in order to complete self care activities.  -MET  Short term goal 2: Patient to complete LB bathing and dressing with set up only - MET  Short term goal 3: Patient to complete toileting task and hygiene with supervision. - MET        Long Term Goals  Time Frame for Long term goals : 2 weeks (18)  Long term goal 1: Patient to complete UB/LB bathing

## 2018-07-05 NOTE — PROGRESS NOTES
Pt up to the bathroom with SBA and walker, gait is steady. Has small brown, formed bowel movement. Returns to be without difficulty and requests and is medicated with PRN Vistaril for c/o itching. Pt denies further needs, call light and bedside table within reach.

## 2018-07-05 NOTE — PROGRESS NOTES
Patient to have increase strength left hip flexion 4/5 to transfer sit to supine independently-MET          Long Term Goals  Time Frame for Long term goals : 2 weeks  Long term goal 1: Patient to have improved dynamic balance to good in order to complete ADLs safely  Long term goal 2: Patient to walk with least restrictive device 150 ft x2 modified independent-Met  Long term goal 3: Patient to ambulate up and down 3 stair steps with handrail independently          [address] Number: [id number]    Date: 7/5/2018

## 2018-07-05 NOTE — PROGRESS NOTES
irrigant completed as ordered; pt tolerated well. Pt denies needs at present, call light and bedside table within reach.

## 2018-07-05 NOTE — PROGRESS NOTES
Up to the bathroom with SBA and walker; voids and passing flatus. Returns to bed and assists with repositioning himself for comfort. Call light and bedside table within reach.

## 2018-07-05 NOTE — PROGRESS NOTES
Pt c/o pain in his left foot and states it is primarily near his toes; skin is dry and intact, leg is elevated on a pillow and pt is medicated with PRN Norco 1 tab.

## 2018-07-05 NOTE — PROGRESS NOTES
Wound to lower left leg healing and much less redness and scaly flaky skin noted. WOund care protocol care completed at 1200 and 1800 today. Tolerates well.

## 2018-07-06 PROCEDURE — 97110 THERAPEUTIC EXERCISES: CPT

## 2018-07-06 PROCEDURE — 2500000003 HC RX 250 WO HCPCS: Performed by: SURGERY

## 2018-07-06 PROCEDURE — 1200000002 HC SEMI PRIVATE SWING BED

## 2018-07-06 PROCEDURE — 6370000000 HC RX 637 (ALT 250 FOR IP): Performed by: SURGERY

## 2018-07-06 PROCEDURE — 97535 SELF CARE MNGMENT TRAINING: CPT

## 2018-07-06 PROCEDURE — 97530 THERAPEUTIC ACTIVITIES: CPT

## 2018-07-06 PROCEDURE — 6360000002 HC RX W HCPCS: Performed by: SURGERY

## 2018-07-06 PROCEDURE — 2580000003 HC RX 258: Performed by: SURGERY

## 2018-07-06 RX ORDER — CIPROFLOXACIN 500 MG/1
500 TABLET, FILM COATED ORAL EVERY 12 HOURS SCHEDULED
Status: DISCONTINUED | OUTPATIENT
Start: 2018-07-06 | End: 2018-07-07 | Stop reason: HOSPADM

## 2018-07-06 RX ADMIN — NEOMYCIN AND POLYMYXIN B SULFATES: 40; 200000 IRRIGANT IRRIGATION at 12:46

## 2018-07-06 RX ADMIN — Medication: at 06:09

## 2018-07-06 RX ADMIN — TAMSULOSIN HYDROCHLORIDE 0.4 MG: 0.4 CAPSULE ORAL at 09:16

## 2018-07-06 RX ADMIN — MICONAZOLE NITRATE: 2 POWDER TOPICAL at 20:17

## 2018-07-06 RX ADMIN — MICONAZOLE NITRATE: 2 POWDER TOPICAL at 09:16

## 2018-07-06 RX ADMIN — Medication: at 12:46

## 2018-07-06 RX ADMIN — Medication: at 18:00

## 2018-07-06 RX ADMIN — NEOMYCIN AND POLYMYXIN B SULFATES: 40; 200000 IRRIGANT IRRIGATION at 19:54

## 2018-07-06 RX ADMIN — Medication 500 MG: at 09:17

## 2018-07-06 RX ADMIN — CIPROFLOXACIN HYDROCHLORIDE 500 MG: 500 TABLET, FILM COATED ORAL at 20:17

## 2018-07-06 RX ADMIN — Medication 10 ML: at 12:46

## 2018-07-06 RX ADMIN — TAMSULOSIN HYDROCHLORIDE 0.4 MG: 0.4 CAPSULE ORAL at 20:17

## 2018-07-06 RX ADMIN — NEOMYCIN AND POLYMYXIN B SULFATES: 40; 200000 IRRIGANT IRRIGATION at 06:09

## 2018-07-06 RX ADMIN — CLOPIDOGREL BISULFATE 75 MG: 75 TABLET ORAL at 09:16

## 2018-07-06 RX ADMIN — HYDROCODONE BITARTRATE AND ACETAMINOPHEN 1 TABLET: 5; 325 TABLET ORAL at 21:42

## 2018-07-06 RX ADMIN — Medication 10 ML: at 09:16

## 2018-07-06 ASSESSMENT — PAIN SCALES - GENERAL
PAINLEVEL_OUTOF10: 0
PAINLEVEL_OUTOF10: 0
PAINLEVEL_OUTOF10: 5

## 2018-07-06 NOTE — PROGRESS NOTES
Phone: [place]  Date: 2018  Fax: [phone]      Physical Therapy    Daily Note    Patient Name: Tamara Gordon      : 1930  (80 y.o.)  MRN: [id number]     [] Patient requires additional Physical Therapy    [x] Anticipate Physical Therapy Discharge Soon     Assessment  Bridging: Supervision     Supine to Sit: Supervision  Sit to Supine: Modified independent  Scooting: Supervision    Sit to Stand: Independent  Stand to sit: Independent  Bed to Chair: Modified independent             Ambulation 1  Surface: level tile  Device: No Device  Assistance: Modified Independent, Independent  Quality of Gait: steady  Distance: 75 ft x2  Comments: patient room to therapy room back to patient room        Stairs  # Steps : 5 (x2 laps)  Stairs Height: 4\" (and 6\")  Rails: Left ascending  Assistance: Modified independent , Supervision  Comment: ---    Assessment: Patient found ambulating his his room without device independently. Ambulates to thearpy room without device and is steady and safe. Performs exercise and balance activites as stated above without difficulty. Up/down steps with L ascending rail with supervision/mod I. Notes incease in pain in his R knee with ascending 6\" steps. No LOB noted during balance activites. Returns to room again without device and with mod I.  Sits up in chair following to eat lunch. Patient plans to discharge home later tomorrow afternoon.      Safety Devices  Type of devices: Call light within reach, Left in chair          Time In: 1050  Time Out: 1132  Timed Coded Minutes: 42  Total Treatment Time:42    Exercises:  See [address] Per Plan Of Care    Goals  Short Term Goals  Time Frame for Short term goals: 1 week  Short term goal 1: Patient to have increase strength left knee extension 4+/5 to transfer sit to stand modified independent-MET  Short term goal 2: Patient to walk 100 ftx2 with wheeled walker and SBA-MET  Short term goal 3: Patient to have increase strength left hip flexion 4/5 to transfer sit to supine independently-MET          Long Term Goals  Time Frame for Long term goals : 2 weeks  Long term goal 1: Patient to have improved dynamic balance to good in order to complete ADLs safely-MET  Long term goal 2: Patient to walk with least restrictive device 150 ft x2 modified independent-Met  Long term goal 3: Patient to ambulate up and down 3 stair steps with handrail independently-MET          [facility] Therapy License Number: [id number]    Date: 7/6/2018

## 2018-07-06 NOTE — PROGRESS NOTES
-MET       Time In: 1300  Time Out: 1350  Timed Coded Minutes: 50  Total Treatment Time: 50    Denisa ABERNATHY OTR/L  Date: 7/6/2018

## 2018-07-06 NOTE — PROGRESS NOTES
HOSP GENERAL [facility]  Occupational Therapy  Daily Note  Date: 2018  Patient Name: Moustapha Peñaloza        MRN: [id number]    : 1930  (80 y.o.)    Subjective: Pt supine in bed upon arrival.    Objective  ADL     Feeding: Independent  Grooming: Independent  UE Bathing: Setup, Modified independent   LE Bathing: Setup, Modified independent   UE Dressing: Setup, Modified independent   LE Dressing: Setup, Modified independent   Toileting: Modified independent   Toilet Transfer: Modified independent        Supine to Sit: Independent  Sit to Supine: Supervision     Sit to stand: Modified independent  Stand to sit: Modified independent                 Shower Transfers: Supervision   Balance  Sitting Balance: Independent  Standing Balance: Modified independent   Standing Balance  Time: 10 minutes  Activity: B UE bike (unsupported)  Sit to stand: Modified independent  Stand to sit: Modified independent    Assessment  Assessment: Pt supine in bed upon arrival. Completes all functional mobility & transfers w/ Mod I this am. Completes full shower this am & completes UB/LB bathing & dressing as documented above. Returns to sit in chair Mod I w/ FWW and remains there w/ call light in reach and all needs met. Prognosis: Good  Discharge Recommendations: Continue to assess pending progress       Goals  Short Term Goals  Time Frame for Short term goals: 1 week (18)  Short term goal 1: Patient to tolerate static standing x 5 minutes in order to complete self care activities. -MET  Short term goal 2: Patient to complete LB bathing and dressing with set up only - MET  Short term goal 3: Patient to complete toileting task and hygiene with supervision. - MET        Long Term Goals  Time Frame for Long term goals : 2 weeks (18)  Long term goal 1: Patient to complete UB/LB bathing independently. Long term goal 2: Patient to complete UB/LB dressing independently.    Long term goal 3: Patient to tolerate static stnading x7

## 2018-07-07 VITALS
WEIGHT: 211.2 LBS | DIASTOLIC BLOOD PRESSURE: 64 MMHG | BODY MASS INDEX: 31.28 KG/M2 | OXYGEN SATURATION: 97 % | TEMPERATURE: 98 F | SYSTOLIC BLOOD PRESSURE: 104 MMHG | RESPIRATION RATE: 18 BRPM | HEART RATE: 77 BPM | HEIGHT: 69 IN

## 2018-07-07 PROCEDURE — 6370000000 HC RX 637 (ALT 250 FOR IP): Performed by: SURGERY

## 2018-07-07 PROCEDURE — 2580000003 HC RX 258: Performed by: SURGERY

## 2018-07-07 PROCEDURE — 2500000003 HC RX 250 WO HCPCS: Performed by: SURGERY

## 2018-07-07 PROCEDURE — 97110 THERAPEUTIC EXERCISES: CPT

## 2018-07-07 RX ORDER — CLOPIDOGREL BISULFATE 75 MG/1
75 TABLET ORAL DAILY
Qty: 90 TABLET | Refills: 0 | Status: SHIPPED | OUTPATIENT
Start: 2018-07-08 | End: 2019-02-14 | Stop reason: SDUPTHER

## 2018-07-07 RX ORDER — FUROSEMIDE 20 MG/1
20 TABLET ORAL DAILY
Qty: 60 TABLET | Refills: 3 | Status: SHIPPED | OUTPATIENT
Start: 2018-07-07 | End: 2018-07-07

## 2018-07-07 RX ORDER — THIAMINE HCL 100 MG
500 TABLET ORAL DAILY
Qty: 30 TABLET | Refills: 5 | Status: SHIPPED | OUTPATIENT
Start: 2018-07-08 | End: 2018-07-07 | Stop reason: HOSPADM

## 2018-07-07 RX ORDER — FUROSEMIDE 20 MG/1
20 TABLET ORAL EVERY OTHER DAY
Qty: 60 TABLET | Refills: 3 | Status: SHIPPED | OUTPATIENT
Start: 2018-07-07 | End: 2019-06-10 | Stop reason: ALTCHOICE

## 2018-07-07 RX ORDER — ASPIRIN 81 MG/1
81 TABLET ORAL DAILY
Qty: 30 TABLET | Refills: 3 | Status: ON HOLD | OUTPATIENT
Start: 2018-07-07 | End: 2018-10-19 | Stop reason: ALTCHOICE

## 2018-07-07 RX ADMIN — TAMSULOSIN HYDROCHLORIDE 0.4 MG: 0.4 CAPSULE ORAL at 09:39

## 2018-07-07 RX ADMIN — HYDROXYZINE PAMOATE 25 MG: 25 CAPSULE ORAL at 01:01

## 2018-07-07 RX ADMIN — Medication: at 12:01

## 2018-07-07 RX ADMIN — CIPROFLOXACIN HYDROCHLORIDE 500 MG: 500 TABLET, FILM COATED ORAL at 09:30

## 2018-07-07 RX ADMIN — NEOMYCIN AND POLYMYXIN B SULFATES: 40; 200000 IRRIGANT IRRIGATION at 05:54

## 2018-07-07 RX ADMIN — Medication: at 05:55

## 2018-07-07 RX ADMIN — CLOPIDOGREL BISULFATE 75 MG: 75 TABLET ORAL at 09:39

## 2018-07-07 RX ADMIN — NEOMYCIN AND POLYMYXIN B SULFATES: 40; 200000 IRRIGANT IRRIGATION at 00:58

## 2018-07-07 RX ADMIN — MICONAZOLE NITRATE: 2 POWDER TOPICAL at 09:41

## 2018-07-07 RX ADMIN — Medication: at 00:58

## 2018-07-07 RX ADMIN — Medication 500 MG: at 09:40

## 2018-07-07 RX ADMIN — NEOMYCIN AND POLYMYXIN B SULFATES: 40; 200000 IRRIGANT IRRIGATION at 11:58

## 2018-07-07 RX ADMIN — HYDROCODONE BITARTRATE AND ACETAMINOPHEN 1 TABLET: 5; 325 TABLET ORAL at 13:09

## 2018-07-07 ASSESSMENT — PAIN DESCRIPTION - PAIN TYPE: TYPE: CHRONIC PAIN

## 2018-07-07 ASSESSMENT — PAIN SCALES - GENERAL
PAINLEVEL_OUTOF10: 5
PAINLEVEL_OUTOF10: 2
PAINLEVEL_OUTOF10: 0
PAINLEVEL_OUTOF10: 0

## 2018-07-07 ASSESSMENT — PAIN DESCRIPTION - LOCATION: LOCATION: HIP

## 2018-07-07 NOTE — DISCHARGE INSTR - MEDS
Regular diet  Take baby aspirin 81 mg per day  See Dr. Gwendolyn Dgeroot in his office for\" wound check\"  Walking with walker is encourage to stimulate circulation. Weigh yourself at home immediately on arrival and write this down. Then every morning. Bring this list to office with all of your medications each visit.

## 2018-07-07 NOTE — PROGRESS NOTES
Discharge instructions given to pt and dgtr with special attention to medications, wound care, weight log. All questions answered and dgtr verbalizes understanding. Given supplies for next couple dressing changes. Explained need to refrigerate  irrigant.

## 2018-07-07 NOTE — PROGRESS NOTES
Patient reports he has been able to nod off and is feeling better this at this time. Vital signs completed and charted. Pt up to the bathroom at this time and is encouraged to use call light in bathroom or room for assistance as needed; pt verbalizes understanding.

## 2018-09-19 NOTE — DISCHARGE SUMMARY
extensive treatment of the left lower extremity with  4-times-a-day Wound Care service. I performed daily wound care to the  extremity circumferentially from the lower portion of kneecap down on the  left leg. He receives parenteral antibiotic and oral antibiotic treatment  because of the polymicrobial nature of the infection and the extensive  partial-thickness skin loss shows signs of recovery with control of the  infection and extensive care of the leg and debridement of tissue debris. The patient has attempts at controlling the bilateral lower extremity  edema, which is particularly severe on ischemic left leg, but we are  somewhat hamstrung in this by the fact that the patient has prostatic  hypertrophy and urinary outlet obstruction. Consultation is obtained with  the urological services. We cannot use large bolus therapy for Lasix to  reduce the extremities so we have to rely more on body positioning and  lower doses of Lasix depending on the patient's ability to evacuate the  urine generated by such treatment and we monitor these with scans of his  bladder, we initiate Flomax therapy and this helps to a great degree. The  leg has sufficient recovery to allow for the patient to undergo physical  therapy, which he then undergoes and due to his age, emphysema and  sedentary lifestyle, he is slow to make gains but has stabilization of his  gait and reduction in falling hazard. Family members have been instructed  on the wound care and he is therefore discharged to his home to continue  his Flomax on a b.i.d. basis, his lisinopril to control his cardiomegaly  and he will be continued on clopidogrel and he will receive Lasix every  other day, which his urinary condition allows him to handle without  developing of bladder distention. He will be seen in the office of Dr. Byron Joy following discharge for followup evaluation and wound care. All  of his questions are answered and those of his family.   We

## 2018-10-19 ENCOUNTER — APPOINTMENT (OUTPATIENT)
Dept: GENERAL RADIOLOGY | Age: 83
DRG: 603 | End: 2018-10-19
Attending: SURGERY
Payer: MEDICARE

## 2018-10-19 ENCOUNTER — HOSPITAL ENCOUNTER (INPATIENT)
Age: 83
LOS: 3 days | Discharge: HOME | DRG: 603 | End: 2018-10-22
Attending: SURGERY | Admitting: SURGERY
Payer: MEDICARE

## 2018-10-19 PROBLEM — L03.116 CELLULITIS OF LEFT LEG: Status: ACTIVE | Noted: 2018-10-19

## 2018-10-19 PROBLEM — E44.0 MODERATE MALNUTRITION (HCC): Status: ACTIVE | Noted: 2018-06-25

## 2018-10-19 PROBLEM — L03.116 LEFT LEG CELLULITIS: Status: ACTIVE | Noted: 2018-10-19

## 2018-10-19 PROCEDURE — 2500000003 HC RX 250 WO HCPCS: Performed by: SURGERY

## 2018-10-19 PROCEDURE — 6360000002 HC RX W HCPCS: Performed by: SURGERY

## 2018-10-19 PROCEDURE — 87070 CULTURE OTHR SPECIMN AEROBIC: CPT

## 2018-10-19 PROCEDURE — 71045 X-RAY EXAM CHEST 1 VIEW: CPT

## 2018-10-19 PROCEDURE — 6370000000 HC RX 637 (ALT 250 FOR IP): Performed by: SURGERY

## 2018-10-19 PROCEDURE — 87205 SMEAR GRAM STAIN: CPT

## 2018-10-19 PROCEDURE — 1200000000 HC SEMI PRIVATE

## 2018-10-19 PROCEDURE — 2580000003 HC RX 258: Performed by: SURGERY

## 2018-10-19 RX ORDER — TAMSULOSIN HYDROCHLORIDE 0.4 MG/1
0.4 CAPSULE ORAL 2 TIMES DAILY
Status: DISCONTINUED | OUTPATIENT
Start: 2018-10-19 | End: 2018-10-22 | Stop reason: HOSPADM

## 2018-10-19 RX ORDER — CHOLECALCIFEROL (VITAMIN D3) 125 MCG
5 CAPSULE ORAL DAILY PRN
COMMUNITY

## 2018-10-19 RX ORDER — IBUPROFEN 200 MG
400 TABLET ORAL EVERY 6 HOURS PRN
Status: DISCONTINUED | OUTPATIENT
Start: 2018-10-19 | End: 2018-10-22 | Stop reason: HOSPADM

## 2018-10-19 RX ORDER — ATORVASTATIN CALCIUM 20 MG/1
40 TABLET, FILM COATED ORAL NIGHTLY
Status: DISCONTINUED | OUTPATIENT
Start: 2018-10-19 | End: 2018-10-22 | Stop reason: HOSPADM

## 2018-10-19 RX ORDER — NEOMYCIN AND POLYMYXIN B SULFATES 40; 200000 MG/ML; [USP'U]/ML
SOLUTION IRRIGATION ONCE
Status: DISCONTINUED | OUTPATIENT
Start: 2018-10-19 | End: 2018-10-19

## 2018-10-19 RX ORDER — FUROSEMIDE 20 MG/1
20 TABLET ORAL EVERY OTHER DAY
Status: DISCONTINUED | OUTPATIENT
Start: 2018-10-20 | End: 2018-10-22 | Stop reason: HOSPADM

## 2018-10-19 RX ORDER — CIPROFLOXACIN 2 MG/ML
400 INJECTION, SOLUTION INTRAVENOUS 2 TIMES DAILY
Status: DISCONTINUED | OUTPATIENT
Start: 2018-10-19 | End: 2018-10-22 | Stop reason: HOSPADM

## 2018-10-19 RX ORDER — CHOLECALCIFEROL (VITAMIN D3) 125 MCG
5 CAPSULE ORAL NIGHTLY PRN
Status: DISCONTINUED | OUTPATIENT
Start: 2018-10-19 | End: 2018-10-22 | Stop reason: HOSPADM

## 2018-10-19 RX ORDER — ATORVASTATIN CALCIUM 40 MG/1
40 TABLET, FILM COATED ORAL NIGHTLY
COMMUNITY

## 2018-10-19 RX ORDER — CALCIUM CARBONATE 260MG(650)
100 TABLET,CHEWABLE ORAL DAILY
COMMUNITY

## 2018-10-19 RX ORDER — SODIUM CHLORIDE 0.9 % (FLUSH) 0.9 %
10 SYRINGE (ML) INJECTION PRN
Status: DISCONTINUED | OUTPATIENT
Start: 2018-10-19 | End: 2018-10-22 | Stop reason: HOSPADM

## 2018-10-19 RX ORDER — PARSLEY 450 MG
570 CAPSULE ORAL DAILY
Status: DISCONTINUED | OUTPATIENT
Start: 2018-10-20 | End: 2018-10-22 | Stop reason: HOSPADM

## 2018-10-19 RX ORDER — HYDROXYZINE 50 MG/1
50 TABLET, FILM COATED ORAL EVERY 6 HOURS PRN
Status: DISCONTINUED | OUTPATIENT
Start: 2018-10-19 | End: 2018-10-19 | Stop reason: CLARIF

## 2018-10-19 RX ORDER — NEOMYCIN AND POLYMYXIN B SULFATES 40; 200000 MG/ML; [USP'U]/ML
SOLUTION IRRIGATION EVERY 6 HOURS SCHEDULED
Status: DISCONTINUED | OUTPATIENT
Start: 2018-10-19 | End: 2018-10-22 | Stop reason: HOSPADM

## 2018-10-19 RX ORDER — BOSWELLIA SERRATA EXTRACT 70 %
1 POWDER (GRAM) MISCELLANEOUS DAILY
Status: DISCONTINUED | OUTPATIENT
Start: 2018-10-20 | End: 2018-10-22 | Stop reason: HOSPADM

## 2018-10-19 RX ORDER — LISINOPRIL 5 MG/1
5 TABLET ORAL DAILY
Status: DISCONTINUED | OUTPATIENT
Start: 2018-10-20 | End: 2018-10-22 | Stop reason: HOSPADM

## 2018-10-19 RX ORDER — CEPHALEXIN 250 MG/1
250 CAPSULE ORAL 4 TIMES DAILY
Status: ON HOLD | COMMUNITY
End: 2018-10-22 | Stop reason: HOSPADM

## 2018-10-19 RX ORDER — PANTOPRAZOLE SODIUM 40 MG/1
40 TABLET, DELAYED RELEASE ORAL
Status: DISCONTINUED | OUTPATIENT
Start: 2018-10-19 | End: 2018-10-22 | Stop reason: HOSPADM

## 2018-10-19 RX ORDER — CALCIUM CARBONATE 260MG(650)
100 TABLET,CHEWABLE ORAL DAILY
Status: DISCONTINUED | OUTPATIENT
Start: 2018-10-20 | End: 2018-10-22 | Stop reason: HOSPADM

## 2018-10-19 RX ORDER — HYDROXYZINE HYDROCHLORIDE 10 MG/1
50 TABLET, FILM COATED ORAL EVERY 6 HOURS PRN
Status: DISCONTINUED | OUTPATIENT
Start: 2018-10-19 | End: 2018-10-22 | Stop reason: HOSPADM

## 2018-10-19 RX ORDER — CLOPIDOGREL BISULFATE 75 MG/1
75 TABLET ORAL DAILY
Status: DISCONTINUED | OUTPATIENT
Start: 2018-10-20 | End: 2018-10-22 | Stop reason: HOSPADM

## 2018-10-19 RX ORDER — HYDROXYZINE 50 MG/1
50 TABLET, FILM COATED ORAL EVERY 6 HOURS PRN
COMMUNITY
End: 2019-02-14 | Stop reason: SDUPTHER

## 2018-10-19 RX ORDER — AVENA SATIVA FLOWERING TOP, MATRICARIA CHAMOMILLA, HOPS, PASSIFLORA INCARNATA FLOWERING TOP, ZINC VALERATE DIHYDRATE, ARABICA COFFEE BEAN, STRYCHNOS NUX-VOMICA SEED, AND SULFUR 2; 2; 2; 2; 6; 12; 12; 12 [HP_X]/1; [HP_X]/1; [HP_X]/1; [HP_X]/1; [HP_X]/1; [HP_X]/1; [HP_X]/1; [HP_X]/1
1 TABLET ORAL DAILY
Status: DISCONTINUED | OUTPATIENT
Start: 2018-10-20 | End: 2018-10-22 | Stop reason: HOSPADM

## 2018-10-19 RX ADMIN — PANTOPRAZOLE SODIUM 40 MG: 40 TABLET, DELAYED RELEASE ORAL at 17:11

## 2018-10-19 RX ADMIN — CIPROFLOXACIN 400 MG: 2 INJECTION, SOLUTION INTRAVENOUS at 14:50

## 2018-10-19 RX ADMIN — TAMSULOSIN HYDROCHLORIDE 0.4 MG: 0.4 CAPSULE ORAL at 21:23

## 2018-10-19 RX ADMIN — Medication 10 ML: at 14:50

## 2018-10-19 RX ADMIN — NEOMYCIN AND POLYMYXIN B SULFATES: 40; 200000 IRRIGANT IRRIGATION at 18:41

## 2018-10-19 RX ADMIN — ATORVASTATIN CALCIUM 40 MG: 20 TABLET, FILM COATED ORAL at 21:23

## 2018-10-19 RX ADMIN — Medication 10 ML: at 15:53

## 2018-10-19 RX ADMIN — Medication 100 MG: at 16:54

## 2018-10-19 RX ADMIN — HYDROXYZINE HYDROCHLORIDE 50 MG: 10 TABLET, FILM COATED ORAL at 21:22

## 2018-10-19 ASSESSMENT — PAIN SCALES - GENERAL
PAINLEVEL_OUTOF10: 0
PAINLEVEL_OUTOF10: 0

## 2018-10-19 NOTE — H&P
Historical Provider, MD   Albuterol Sulfate (PROAIR HFA IN) Inhale into the lungs 2 puffs every 6 hours as needed. Historical Provider, MD   traMADol (ULTRAM) 50 MG tablet Take 50 mg by mouth every 6 hours as needed for Pain       Historical Provider, MD   hydroxyzine (VISTARIL) 50 MG capsule Take 50 mg by mouth 3 times daily as needed for Itching       Historical Provider, MD   furosemide (LASIX) 20 MG tablet Take 20 mg by mouth 2 times daily        Historical Provider, MD   tamsulosin (FLOMAX) 0.4 MG capsule Take 0.4 mg by mouth 2 times daily       Historical Provider, MD   lisinopril (PRINIVIL;ZESTRIL) 5 MG tablet Take 5 mg by mouth daily       Historical Provider, MD      ,   Current Hospital Medications   No current facility-administered medications for this encounter. Allergies: Patient has no known allergies. Previous Surgery: percutaneous cath with angioplasty left leg 6/18, t and a, herniorrhaphy         Physical Examination     Constitutional: well nourished      Neurological: oriented times three cn 2-12 intact  Conductive hearing deficit      Eyes: perrla eom intact conj pink non icteric      Lymphatic wnl      Neck/Ear/Nose/Throat: no carotid bruits no masses slight cervical kyphosis     Respiratory: increased ap diameter chest slow expiration no rales no rhonchi      Cardiovascular: rrr no murmurs     Abdomen/GI: soft nor masses no tenderness. normal bowel sounds . Musculoskeletal: left lower leg swollen multiple open areas. Large zone of cellulitis left leg 20 cm by 37 cm warm, tender, some drainage from open areas ( c and s taken). Circumference left is 39 cm at tibial tuberosity (marked) and right is 40 cm at tibial tuberosity (marked). Zone of cellulitis outlined on skin. Hand held doppler with external speaker on:  dp and pt both sides present . Stronger signal left side. Pulses poorly palpable on right.   Venous signal at popliteal shows good repiratory excusions ,

## 2018-10-19 NOTE — PLAN OF CARE
Problem: SAFETY  Goal: Free from accidental physical injury  Outcome: Met This Shift  No injury at this time - will continue to monitor.      Problem: DAILY CARE  Goal: Daily care needs are met  Outcome: Met This Shift

## 2018-10-20 PROCEDURE — 2500000003 HC RX 250 WO HCPCS: Performed by: SURGERY

## 2018-10-20 PROCEDURE — 6370000000 HC RX 637 (ALT 250 FOR IP): Performed by: SURGERY

## 2018-10-20 PROCEDURE — G0009 ADMIN PNEUMOCOCCAL VACCINE: HCPCS | Performed by: SURGERY

## 2018-10-20 PROCEDURE — 2580000003 HC RX 258: Performed by: SURGERY

## 2018-10-20 PROCEDURE — 1200000000 HC SEMI PRIVATE

## 2018-10-20 PROCEDURE — 90670 PCV13 VACCINE IM: CPT | Performed by: SURGERY

## 2018-10-20 PROCEDURE — 6360000002 HC RX W HCPCS: Performed by: SURGERY

## 2018-10-20 RX ADMIN — NEOMYCIN AND POLYMYXIN B SULFATES: 40; 200000 IRRIGANT IRRIGATION at 17:38

## 2018-10-20 RX ADMIN — HYDROXYZINE HYDROCHLORIDE 50 MG: 10 TABLET, FILM COATED ORAL at 13:18

## 2018-10-20 RX ADMIN — AVENA SATIVA FLOWERING TOP, MATRICARIA CHAMOMILLA, HOPS, PASSIFLORA INCARNATA FLOWERING TOP, ZINC VALERATE DIHYDRATE, ARABICA COFFEE BEAN, STRYCHNOS NUX-VOMICA SEED, AND SULFUR 1 TABLET: 2; 2; 2; 2; 6; 12; 12; 12 TABLET ORAL at 09:23

## 2018-10-20 RX ADMIN — FUROSEMIDE 20 MG: 20 TABLET ORAL at 09:21

## 2018-10-20 RX ADMIN — HYDROXYZINE HYDROCHLORIDE 50 MG: 10 TABLET, FILM COATED ORAL at 06:16

## 2018-10-20 RX ADMIN — ATORVASTATIN CALCIUM 40 MG: 20 TABLET, FILM COATED ORAL at 20:47

## 2018-10-20 RX ADMIN — TAMSULOSIN HYDROCHLORIDE 0.4 MG: 0.4 CAPSULE ORAL at 09:21

## 2018-10-20 RX ADMIN — PANTOPRAZOLE SODIUM 40 MG: 40 TABLET, DELAYED RELEASE ORAL at 06:16

## 2018-10-20 RX ADMIN — Medication 100 MG: at 09:22

## 2018-10-20 RX ADMIN — PANTOPRAZOLE SODIUM 40 MG: 40 TABLET, DELAYED RELEASE ORAL at 16:34

## 2018-10-20 RX ADMIN — LISINOPRIL 5 MG: 5 TABLET ORAL at 09:21

## 2018-10-20 RX ADMIN — NEOMYCIN AND POLYMYXIN B SULFATES: 40; 200000 IRRIGANT IRRIGATION at 11:44

## 2018-10-20 RX ADMIN — NEOMYCIN AND POLYMYXIN B SULFATES: 40; 200000 IRRIGANT IRRIGATION at 06:11

## 2018-10-20 RX ADMIN — CLOPIDOGREL BISULFATE 75 MG: 75 TABLET ORAL at 09:21

## 2018-10-20 RX ADMIN — Medication 450 MG: at 09:23

## 2018-10-20 RX ADMIN — TAMSULOSIN HYDROCHLORIDE 0.4 MG: 0.4 CAPSULE ORAL at 20:47

## 2018-10-20 RX ADMIN — CIPROFLOXACIN 400 MG: 2 INJECTION, SOLUTION INTRAVENOUS at 17:38

## 2018-10-20 RX ADMIN — Medication 10 ML: at 09:21

## 2018-10-20 RX ADMIN — Medication 1 CAPSULE: at 09:23

## 2018-10-20 RX ADMIN — CIPROFLOXACIN 400 MG: 2 INJECTION, SOLUTION INTRAVENOUS at 06:16

## 2018-10-20 RX ADMIN — NEOMYCIN AND POLYMYXIN B SULFATES: 40; 200000 IRRIGANT IRRIGATION at 00:19

## 2018-10-20 RX ADMIN — HYDROXYZINE HYDROCHLORIDE 50 MG: 10 TABLET, FILM COATED ORAL at 21:17

## 2018-10-20 RX ADMIN — Medication 570 MG: at 09:24

## 2018-10-20 RX ADMIN — Medication 10 ML: at 17:38

## 2018-10-20 RX ADMIN — PNEUMOCOCCAL 13-VALENT CONJUGATE VACCINE 0.5 ML: 2.2; 2.2; 2.2; 2.2; 2.2; 4.4; 2.2; 2.2; 2.2; 2.2; 2.2; 2.2; 2.2 INJECTION, SUSPENSION INTRAMUSCULAR at 09:29

## 2018-10-20 RX ADMIN — Medication 10 ML: at 06:16

## 2018-10-20 ASSESSMENT — PAIN SCALES - GENERAL
PAINLEVEL_OUTOF10: 0

## 2018-10-21 PROCEDURE — 6360000002 HC RX W HCPCS: Performed by: SURGERY

## 2018-10-21 PROCEDURE — 1200000000 HC SEMI PRIVATE

## 2018-10-21 PROCEDURE — 2580000003 HC RX 258: Performed by: SURGERY

## 2018-10-21 PROCEDURE — 2500000003 HC RX 250 WO HCPCS: Performed by: SURGERY

## 2018-10-21 PROCEDURE — 6370000000 HC RX 637 (ALT 250 FOR IP): Performed by: SURGERY

## 2018-10-21 RX ADMIN — PANTOPRAZOLE SODIUM 40 MG: 40 TABLET, DELAYED RELEASE ORAL at 05:52

## 2018-10-21 RX ADMIN — HYDROXYZINE HYDROCHLORIDE 50 MG: 10 TABLET, FILM COATED ORAL at 06:22

## 2018-10-21 RX ADMIN — PANTOPRAZOLE SODIUM 40 MG: 40 TABLET, DELAYED RELEASE ORAL at 16:15

## 2018-10-21 RX ADMIN — TAMSULOSIN HYDROCHLORIDE 0.4 MG: 0.4 CAPSULE ORAL at 21:34

## 2018-10-21 RX ADMIN — Medication 5 MG: at 21:37

## 2018-10-21 RX ADMIN — NEOMYCIN AND POLYMYXIN B SULFATES: 40; 200000 IRRIGANT IRRIGATION at 05:49

## 2018-10-21 RX ADMIN — Medication 1 CAPSULE: at 09:51

## 2018-10-21 RX ADMIN — ATORVASTATIN CALCIUM 40 MG: 20 TABLET, FILM COATED ORAL at 21:34

## 2018-10-21 RX ADMIN — AVENA SATIVA FLOWERING TOP, MATRICARIA CHAMOMILLA, HOPS, PASSIFLORA INCARNATA FLOWERING TOP, ZINC VALERATE DIHYDRATE, ARABICA COFFEE BEAN, STRYCHNOS NUX-VOMICA SEED, AND SULFUR 1 TABLET: 2; 2; 2; 2; 6; 12; 12; 12 TABLET ORAL at 09:51

## 2018-10-21 RX ADMIN — CIPROFLOXACIN 400 MG: 2 INJECTION, SOLUTION INTRAVENOUS at 05:49

## 2018-10-21 RX ADMIN — Medication 570 MG: at 09:51

## 2018-10-21 RX ADMIN — NEOMYCIN AND POLYMYXIN B SULFATES: 40; 200000 IRRIGANT IRRIGATION at 00:15

## 2018-10-21 RX ADMIN — Medication 450 MG: at 09:51

## 2018-10-21 RX ADMIN — Medication 10 ML: at 21:44

## 2018-10-21 RX ADMIN — CIPROFLOXACIN 400 MG: 2 INJECTION, SOLUTION INTRAVENOUS at 18:16

## 2018-10-21 RX ADMIN — CLOPIDOGREL BISULFATE 75 MG: 75 TABLET ORAL at 09:49

## 2018-10-21 RX ADMIN — TAMSULOSIN HYDROCHLORIDE 0.4 MG: 0.4 CAPSULE ORAL at 09:49

## 2018-10-21 RX ADMIN — HYDROXYZINE HYDROCHLORIDE 50 MG: 10 TABLET, FILM COATED ORAL at 19:04

## 2018-10-21 RX ADMIN — Medication 100 MG: at 09:51

## 2018-10-21 RX ADMIN — NEOMYCIN AND POLYMYXIN B SULFATES: 40; 200000 IRRIGANT IRRIGATION at 18:16

## 2018-10-21 RX ADMIN — LISINOPRIL 5 MG: 5 TABLET ORAL at 09:49

## 2018-10-21 RX ADMIN — NEOMYCIN AND POLYMYXIN B SULFATES: 40; 200000 IRRIGANT IRRIGATION at 13:19

## 2018-10-21 ASSESSMENT — PAIN SCALES - GENERAL
PAINLEVEL_OUTOF10: 0
PAINLEVEL_OUTOF10: 3

## 2018-10-22 VITALS
WEIGHT: 196.5 LBS | TEMPERATURE: 97.5 F | DIASTOLIC BLOOD PRESSURE: 62 MMHG | HEART RATE: 72 BPM | RESPIRATION RATE: 20 BRPM | OXYGEN SATURATION: 96 % | BODY MASS INDEX: 31.58 KG/M2 | HEIGHT: 66 IN | SYSTOLIC BLOOD PRESSURE: 110 MMHG

## 2018-10-22 LAB
CULTURE: ABNORMAL
DIRECT EXAM: ABNORMAL
DIRECT EXAM: ABNORMAL
Lab: ABNORMAL
SPECIMEN DESCRIPTION: ABNORMAL
STATUS: ABNORMAL

## 2018-10-22 PROCEDURE — 2500000003 HC RX 250 WO HCPCS: Performed by: SURGERY

## 2018-10-22 PROCEDURE — 6360000002 HC RX W HCPCS: Performed by: SURGERY

## 2018-10-22 PROCEDURE — 6370000000 HC RX 637 (ALT 250 FOR IP): Performed by: SURGERY

## 2018-10-22 RX ORDER — CIPROFLOXACIN 500 MG/1
500 TABLET, FILM COATED ORAL 2 TIMES DAILY
Qty: 20 TABLET | Refills: 0 | Status: SHIPPED | OUTPATIENT
Start: 2018-10-22 | End: 2018-11-01

## 2018-10-22 RX ORDER — CLOPIDOGREL BISULFATE 75 MG/1
75 TABLET ORAL DAILY
Qty: 30 TABLET | Refills: 3 | Status: SHIPPED | OUTPATIENT
Start: 2018-10-23 | End: 2018-10-22 | Stop reason: HOSPADM

## 2018-10-22 RX ORDER — BACITRACIN, NEOMYCIN, POLYMYXIN B 400; 3.5; 5 [USP'U]/G; MG/G; [USP'U]/G
OINTMENT TOPICAL
Qty: 1 EACH | Refills: 0
Start: 2018-10-22 | End: 2018-11-01

## 2018-10-22 RX ORDER — HYDROXYZINE 50 MG/1
50 TABLET, FILM COATED ORAL EVERY 6 HOURS PRN
Qty: 56 TABLET | Refills: 1 | Status: SHIPPED | OUTPATIENT
Start: 2018-10-22 | End: 2018-10-22 | Stop reason: HOSPADM

## 2018-10-22 RX ADMIN — NEOMYCIN AND POLYMYXIN B SULFATES: 40; 200000 IRRIGANT IRRIGATION at 00:30

## 2018-10-22 RX ADMIN — CIPROFLOXACIN 400 MG: 2 INJECTION, SOLUTION INTRAVENOUS at 06:03

## 2018-10-22 RX ADMIN — Medication 570 MG: at 09:03

## 2018-10-22 RX ADMIN — TAMSULOSIN HYDROCHLORIDE 0.4 MG: 0.4 CAPSULE ORAL at 09:02

## 2018-10-22 RX ADMIN — FUROSEMIDE 20 MG: 20 TABLET ORAL at 09:02

## 2018-10-22 RX ADMIN — HYDROXYZINE HYDROCHLORIDE 50 MG: 10 TABLET, FILM COATED ORAL at 06:44

## 2018-10-22 RX ADMIN — Medication 450 MG: at 09:02

## 2018-10-22 RX ADMIN — LISINOPRIL 5 MG: 5 TABLET ORAL at 09:02

## 2018-10-22 RX ADMIN — AVENA SATIVA FLOWERING TOP, MATRICARIA CHAMOMILLA, HOPS, PASSIFLORA INCARNATA FLOWERING TOP, ZINC VALERATE DIHYDRATE, ARABICA COFFEE BEAN, STRYCHNOS NUX-VOMICA SEED, AND SULFUR 1 TABLET: 2; 2; 2; 2; 6; 12; 12; 12 TABLET ORAL at 09:03

## 2018-10-22 RX ADMIN — Medication: at 09:03

## 2018-10-22 RX ADMIN — NEOMYCIN AND POLYMYXIN B SULFATES: 40; 200000 IRRIGANT IRRIGATION at 12:49

## 2018-10-22 RX ADMIN — Medication: at 12:45

## 2018-10-22 RX ADMIN — Medication 100 MG: at 09:02

## 2018-10-22 RX ADMIN — PANTOPRAZOLE SODIUM 40 MG: 40 TABLET, DELAYED RELEASE ORAL at 16:01

## 2018-10-22 RX ADMIN — HYDROXYZINE HYDROCHLORIDE 50 MG: 10 TABLET, FILM COATED ORAL at 14:09

## 2018-10-22 RX ADMIN — CLOPIDOGREL BISULFATE 75 MG: 75 TABLET ORAL at 09:02

## 2018-10-22 RX ADMIN — Medication 1 CAPSULE: at 09:03

## 2018-10-22 RX ADMIN — PANTOPRAZOLE SODIUM 40 MG: 40 TABLET, DELAYED RELEASE ORAL at 09:02

## 2018-10-22 RX ADMIN — NEOMYCIN AND POLYMYXIN B SULFATES: 40; 200000 IRRIGANT IRRIGATION at 06:30

## 2018-10-22 ASSESSMENT — PAIN SCALES - GENERAL
PAINLEVEL_OUTOF10: 0

## 2019-02-14 ENCOUNTER — OFFICE VISIT (OUTPATIENT)
Dept: UROLOGY | Age: 84
End: 2019-02-14
Payer: MEDICARE

## 2019-02-14 ENCOUNTER — HOSPITAL ENCOUNTER (OUTPATIENT)
Age: 84
Setting detail: SPECIMEN
Discharge: HOME | End: 2019-02-14
Payer: MEDICARE

## 2019-02-14 VITALS
SYSTOLIC BLOOD PRESSURE: 130 MMHG | HEIGHT: 69 IN | WEIGHT: 202 LBS | DIASTOLIC BLOOD PRESSURE: 72 MMHG | BODY MASS INDEX: 29.92 KG/M2

## 2019-02-14 DIAGNOSIS — N13.8 BPH WITH OBSTRUCTION/LOWER URINARY TRACT SYMPTOMS: ICD-10-CM

## 2019-02-14 DIAGNOSIS — R33.9 URINARY RETENTION: ICD-10-CM

## 2019-02-14 DIAGNOSIS — N40.1 BPH WITH OBSTRUCTION/LOWER URINARY TRACT SYMPTOMS: Primary | ICD-10-CM

## 2019-02-14 DIAGNOSIS — N13.8 BPH WITH OBSTRUCTION/LOWER URINARY TRACT SYMPTOMS: Primary | ICD-10-CM

## 2019-02-14 DIAGNOSIS — N40.1 BPH WITH OBSTRUCTION/LOWER URINARY TRACT SYMPTOMS: ICD-10-CM

## 2019-02-14 LAB
-: ABNORMAL
AMORPHOUS: ABNORMAL
BACTERIA: ABNORMAL
BILIRUBIN URINE: NEGATIVE
CASTS UA: ABNORMAL /LPF
COLOR: YELLOW
COMMENT UA: ABNORMAL
CRYSTALS, UA: ABNORMAL /HPF
EPITHELIAL CELLS UA: ABNORMAL /HPF
GLUCOSE URINE: NEGATIVE
KETONES, URINE: NEGATIVE
LEUKOCYTE ESTERASE, URINE: NEGATIVE
MUCUS: ABNORMAL
NITRITE, URINE: NEGATIVE
OTHER OBSERVATIONS UA: ABNORMAL
PH UA: 5 (ref 5–8)
PROTEIN UA: NEGATIVE
RBC UA: ABNORMAL /HPF (ref 0–2)
RENAL EPITHELIAL, UA: ABNORMAL /HPF
SPECIFIC GRAVITY UA: 1.02 (ref 1–1.03)
TRICHOMONAS: ABNORMAL
TURBIDITY: CLEAR
URINE HGB: NEGATIVE
UROBILINOGEN, URINE: NORMAL
WBC UA: ABNORMAL /HPF
YEAST: ABNORMAL

## 2019-02-14 PROCEDURE — 4004F PT TOBACCO SCREEN RCVD TLK: CPT | Performed by: UROLOGY

## 2019-02-14 PROCEDURE — G8484 FLU IMMUNIZE NO ADMIN: HCPCS | Performed by: UROLOGY

## 2019-02-14 PROCEDURE — 81001 URINALYSIS AUTO W/SCOPE: CPT

## 2019-02-14 PROCEDURE — 99214 OFFICE O/P EST MOD 30 MIN: CPT | Performed by: UROLOGY

## 2019-02-14 PROCEDURE — G8427 DOCREV CUR MEDS BY ELIG CLIN: HCPCS | Performed by: UROLOGY

## 2019-02-14 PROCEDURE — 87086 URINE CULTURE/COLONY COUNT: CPT

## 2019-02-14 PROCEDURE — 1101F PT FALLS ASSESS-DOCD LE1/YR: CPT | Performed by: UROLOGY

## 2019-02-14 PROCEDURE — 1123F ACP DISCUSS/DSCN MKR DOCD: CPT | Performed by: UROLOGY

## 2019-02-14 PROCEDURE — 4040F PNEUMOC VAC/ADMIN/RCVD: CPT | Performed by: UROLOGY

## 2019-02-14 PROCEDURE — 51798 US URINE CAPACITY MEASURE: CPT | Performed by: UROLOGY

## 2019-02-14 PROCEDURE — G8419 CALC BMI OUT NRM PARAM NOF/U: HCPCS | Performed by: UROLOGY

## 2019-02-14 RX ORDER — CLOPIDOGREL BISULFATE 75 MG/1
75 TABLET ORAL DAILY
COMMUNITY
Start: 2019-02-08

## 2019-02-14 RX ORDER — TRAMADOL HYDROCHLORIDE 50 MG/1
50 TABLET ORAL
COMMUNITY
End: 2019-02-14 | Stop reason: ALTCHOICE

## 2019-02-14 RX ORDER — MULTIVIT WITH MINERALS/LUTEIN
1000 TABLET ORAL DAILY
Status: ON HOLD | COMMUNITY
End: 2021-07-16 | Stop reason: HOSPADM

## 2019-02-14 RX ORDER — ASCORBIC ACID 500 MG
500 TABLET ORAL DAILY
COMMUNITY

## 2019-02-14 RX ORDER — THEOPHYLLINE 600 MG/1
TABLET, EXTENDED RELEASE ORAL
COMMUNITY
Start: 2019-01-25 | End: 2019-02-14 | Stop reason: ALTCHOICE

## 2019-02-14 RX ORDER — HYDROXYZINE PAMOATE 50 MG/1
50 CAPSULE ORAL EVERY 6 HOURS PRN
COMMUNITY

## 2019-02-14 ASSESSMENT — ENCOUNTER SYMPTOMS
VOMITING: 0
ABDOMINAL PAIN: 0
EYE PAIN: 0
SHORTNESS OF BREATH: 0
COLOR CHANGE: 0
COUGH: 0
EYE REDNESS: 0
BACK PAIN: 0
WHEEZING: 0
NAUSEA: 0

## 2019-02-15 LAB
CULTURE: NORMAL
Lab: NORMAL
SPECIMEN DESCRIPTION: NORMAL

## 2019-02-18 ENCOUNTER — TELEPHONE (OUTPATIENT)
Dept: UROLOGY | Age: 84
End: 2019-02-18

## 2019-03-07 ENCOUNTER — HOSPITAL ENCOUNTER (OUTPATIENT)
Age: 84
Setting detail: SPECIMEN
Discharge: HOME | End: 2019-03-07
Payer: MEDICARE

## 2019-03-07 ENCOUNTER — PROCEDURE VISIT (OUTPATIENT)
Dept: UROLOGY | Age: 84
End: 2019-03-07
Payer: MEDICARE

## 2019-03-07 VITALS — WEIGHT: 206 LBS | SYSTOLIC BLOOD PRESSURE: 150 MMHG | BODY MASS INDEX: 30.42 KG/M2 | DIASTOLIC BLOOD PRESSURE: 82 MMHG

## 2019-03-07 DIAGNOSIS — N13.8 BPH WITH OBSTRUCTION/LOWER URINARY TRACT SYMPTOMS: Primary | ICD-10-CM

## 2019-03-07 DIAGNOSIS — N40.1 BPH WITH OBSTRUCTION/LOWER URINARY TRACT SYMPTOMS: Primary | ICD-10-CM

## 2019-03-07 DIAGNOSIS — N13.8 BPH WITH OBSTRUCTION/LOWER URINARY TRACT SYMPTOMS: ICD-10-CM

## 2019-03-07 DIAGNOSIS — R33.9 URINARY RETENTION: ICD-10-CM

## 2019-03-07 DIAGNOSIS — N40.1 BPH WITH OBSTRUCTION/LOWER URINARY TRACT SYMPTOMS: ICD-10-CM

## 2019-03-07 LAB
-: NORMAL
AMORPHOUS: NORMAL
BACTERIA: NORMAL
BILIRUBIN URINE: NEGATIVE
CASTS UA: NORMAL /LPF
COLOR: YELLOW
COMMENT UA: NORMAL
CRYSTALS, UA: NORMAL /HPF
EPITHELIAL CELLS UA: NORMAL /HPF
GLUCOSE URINE: NEGATIVE
KETONES, URINE: NEGATIVE
LEUKOCYTE ESTERASE, URINE: NEGATIVE
MUCUS: NORMAL
NITRITE, URINE: NEGATIVE
OTHER OBSERVATIONS UA: NORMAL
PH UA: 6.5 (ref 5–8)
PROTEIN UA: NEGATIVE
RBC UA: NORMAL /HPF (ref 0–2)
RENAL EPITHELIAL, UA: NORMAL /HPF
SPECIFIC GRAVITY UA: 1.01 (ref 1–1.03)
TRICHOMONAS: NORMAL
TURBIDITY: CLEAR
URINE HGB: NEGATIVE
UROBILINOGEN, URINE: NORMAL
WBC UA: NORMAL /HPF
YEAST: NORMAL

## 2019-03-07 PROCEDURE — 81001 URINALYSIS AUTO W/SCOPE: CPT

## 2019-03-07 PROCEDURE — 4004F PT TOBACCO SCREEN RCVD TLK: CPT | Performed by: UROLOGY

## 2019-03-07 PROCEDURE — 1101F PT FALLS ASSESS-DOCD LE1/YR: CPT | Performed by: UROLOGY

## 2019-03-07 PROCEDURE — 87086 URINE CULTURE/COLONY COUNT: CPT

## 2019-03-07 PROCEDURE — 99215 OFFICE O/P EST HI 40 MIN: CPT | Performed by: UROLOGY

## 2019-03-07 PROCEDURE — G8484 FLU IMMUNIZE NO ADMIN: HCPCS | Performed by: UROLOGY

## 2019-03-07 PROCEDURE — G8427 DOCREV CUR MEDS BY ELIG CLIN: HCPCS | Performed by: UROLOGY

## 2019-03-07 PROCEDURE — 1123F ACP DISCUSS/DSCN MKR DOCD: CPT | Performed by: UROLOGY

## 2019-03-07 PROCEDURE — 52000 CYSTOURETHROSCOPY: CPT | Performed by: UROLOGY

## 2019-03-07 PROCEDURE — 4040F PNEUMOC VAC/ADMIN/RCVD: CPT | Performed by: UROLOGY

## 2019-03-07 PROCEDURE — G8417 CALC BMI ABV UP PARAM F/U: HCPCS | Performed by: UROLOGY

## 2019-03-07 ASSESSMENT — ENCOUNTER SYMPTOMS
EYE REDNESS: 0
COLOR CHANGE: 0
ABDOMINAL PAIN: 0
EYE PAIN: 0
VOMITING: 0
COUGH: 0
NAUSEA: 0
BACK PAIN: 0
WHEEZING: 0
SHORTNESS OF BREATH: 0

## 2019-03-09 LAB
CULTURE: NORMAL
Lab: NORMAL
SPECIMEN DESCRIPTION: NORMAL

## 2019-03-11 ENCOUNTER — TELEPHONE (OUTPATIENT)
Dept: UROLOGY | Age: 84
End: 2019-03-11

## 2019-03-13 ENCOUNTER — TELEPHONE (OUTPATIENT)
Dept: UROLOGY | Age: 84
End: 2019-03-13

## 2019-03-13 DIAGNOSIS — R33.9 URINARY RETENTION: ICD-10-CM

## 2019-03-13 DIAGNOSIS — N40.1 BPH WITH OBSTRUCTION/LOWER URINARY TRACT SYMPTOMS: Primary | ICD-10-CM

## 2019-03-13 DIAGNOSIS — N13.8 BPH WITH OBSTRUCTION/LOWER URINARY TRACT SYMPTOMS: Primary | ICD-10-CM

## 2019-03-13 DIAGNOSIS — Z01.818 PRE-OP TESTING: ICD-10-CM

## 2019-03-22 ENCOUNTER — HOSPITAL ENCOUNTER (OUTPATIENT)
Age: 84
Discharge: HOME | End: 2019-03-22
Payer: MEDICARE

## 2019-03-22 ENCOUNTER — HOSPITAL ENCOUNTER (OUTPATIENT)
Dept: PREADMISSION TESTING | Age: 84
Discharge: HOME | End: 2019-03-22
Payer: MEDICARE

## 2019-03-22 DIAGNOSIS — Z01.818 PRE-OP TESTING: ICD-10-CM

## 2019-03-22 DIAGNOSIS — N40.1 BPH WITH OBSTRUCTION/LOWER URINARY TRACT SYMPTOMS: ICD-10-CM

## 2019-03-22 DIAGNOSIS — N13.8 BPH WITH OBSTRUCTION/LOWER URINARY TRACT SYMPTOMS: ICD-10-CM

## 2019-03-22 DIAGNOSIS — R33.9 URINARY RETENTION: ICD-10-CM

## 2019-03-22 LAB
ABSOLUTE EOS #: 0.1 K/UL (ref 0–0.4)
ABSOLUTE IMMATURE GRANULOCYTE: ABNORMAL K/UL (ref 0–0.3)
ABSOLUTE LYMPH #: 1.2 K/UL (ref 1–4.8)
ABSOLUTE MONO #: 0.6 K/UL (ref 0–1)
ANION GAP SERPL CALCULATED.3IONS-SCNC: 9 MMOL/L (ref 9–17)
BASOPHILS # BLD: 1 % (ref 0–2)
BASOPHILS ABSOLUTE: 0 K/UL (ref 0–0.2)
BUN BLDV-MCNC: 24 MG/DL (ref 8–23)
BUN/CREAT BLD: 20 (ref 9–20)
CALCIUM SERPL-MCNC: 9.8 MG/DL (ref 8.6–10.4)
CHLORIDE BLD-SCNC: 103 MMOL/L (ref 98–107)
CO2: 28 MMOL/L (ref 20–31)
CREAT SERPL-MCNC: 1.19 MG/DL (ref 0.7–1.2)
DIFFERENTIAL TYPE: YES
EKG ATRIAL RATE: 53 BPM
EKG P AXIS: 60 DEGREES
EKG P-R INTERVAL: 222 MS
EKG Q-T INTERVAL: 426 MS
EKG QRS DURATION: 98 MS
EKG QTC CALCULATION (BAZETT): 399 MS
EKG R AXIS: -24 DEGREES
EKG T AXIS: 41 DEGREES
EKG VENTRICULAR RATE: 53 BPM
EOSINOPHILS RELATIVE PERCENT: 2 % (ref 0–5)
GFR AFRICAN AMERICAN: >60 ML/MIN
GFR NON-AFRICAN AMERICAN: 58 ML/MIN
GFR SERPL CREATININE-BSD FRML MDRD: ABNORMAL ML/MIN/{1.73_M2}
GFR SERPL CREATININE-BSD FRML MDRD: ABNORMAL ML/MIN/{1.73_M2}
GLUCOSE BLD-MCNC: 116 MG/DL (ref 70–99)
HCT VFR BLD CALC: 42.4 % (ref 41–53)
HEMOGLOBIN: 14 G/DL (ref 13.5–17.5)
IMMATURE GRANULOCYTES: ABNORMAL %
LYMPHOCYTES # BLD: 20 % (ref 13–44)
MCH RBC QN AUTO: 29.2 PG (ref 26–34)
MCHC RBC AUTO-ENTMCNC: 33 G/DL (ref 31–37)
MCV RBC AUTO: 88.5 FL (ref 80–100)
MONOCYTES # BLD: 9 % (ref 5–9)
NRBC AUTOMATED: ABNORMAL PER 100 WBC
PDW BLD-RTO: 16.1 % (ref 12.1–15.2)
PLATELET # BLD: 200 K/UL (ref 140–450)
PLATELET ESTIMATE: ABNORMAL
PMV BLD AUTO: ABNORMAL FL (ref 6–12)
POTASSIUM SERPL-SCNC: 4.3 MMOL/L (ref 3.7–5.3)
RBC # BLD: 4.79 M/UL (ref 4.5–5.9)
RBC # BLD: ABNORMAL 10*6/UL
SEG NEUTROPHILS: 68 % (ref 39–75)
SEGMENTED NEUTROPHILS ABSOLUTE COUNT: 4.1 K/UL (ref 2.1–6.5)
SODIUM BLD-SCNC: 140 MMOL/L (ref 135–144)
WBC # BLD: 6 K/UL (ref 3.5–11)
WBC # BLD: ABNORMAL 10*3/UL

## 2019-03-22 PROCEDURE — 93005 ELECTROCARDIOGRAM TRACING: CPT

## 2019-03-22 PROCEDURE — 80048 BASIC METABOLIC PNL TOTAL CA: CPT

## 2019-03-22 PROCEDURE — 36415 COLL VENOUS BLD VENIPUNCTURE: CPT

## 2019-03-22 PROCEDURE — 85025 COMPLETE CBC W/AUTO DIFF WBC: CPT

## 2019-03-25 ENCOUNTER — ANESTHESIA EVENT (OUTPATIENT)
Dept: OPERATING ROOM | Age: 84
End: 2019-03-25
Payer: MEDICARE

## 2019-03-27 ENCOUNTER — TELEPHONE (OUTPATIENT)
Dept: UROLOGY | Age: 84
End: 2019-03-27

## 2019-03-27 NOTE — H&P
Christine Tian is a 80 y.o. male  YOB: 1930        Chief complaint: difficulty urinating. Present Illness: Patient has had progressive difficulty urinating and has had one occasion of presenting to the emergency room for placement of urinary catheter on one occasion in acute urinary retention which was successfuly removed days later. His prostate is enlarged. Because of his difficulty with voiding this leads him to avoid use of his diuretics as prescribed for management of his dependent oedem which has resulted in difficulty with ambulation at times and with chronic dermatitis of his lower legs and at times slowly healing mckinley of blistering of skin. .    Family History  Diabetes No  Heart Disease Yes  Tuberculosis No  Cancer No  Other:         Family History: cardiovascular disease at an advanced age. Past History  Asthma No  Chronic Bronchitis Yes  Emphysema Yes  Tuberculosis No  Smokes Yes( pipe 70 years)  Head Injury No  Epilepsy No  Kidney Disease No  Liver Disease/Hepatitis No  Yellow Jaundice No  Diabetes No  Thyroid Disease No  Heart Disease Yes  High Blood Pressure Yes  Angina No  Rheumatic Fever No  Cortisone Rx No  Alcohol Excess No  Pregnancy No  Glaucoma No  Loose Teeth Yes and No  Tranquilizers No  Other: peripheral vascular disease. Past History: hypertension, cardiomegaly, prostatic hypertrophy with urinary outlet obstruction emphysema see serial ekgs since 2015. Social History:  retired farming and industrial work, pipe smoker, very rare etoh, several children sedentary life style in [employer]. Psychosocial Needs: droll sense of humor      Present Medication:  Home Medications                 Prior to Admission medications    Medication Sig Start Date End Date Taking?  Authorizing Provider             magnesium 30 MG tablet Take 30 mg by mouth daily       Historical Provider, MD   POTASSIUM GLUCONATE PO Take by mouth       Historical Provider, MD   Albuterol Sulfate (PROAIR HFA IN) Inhale into the lungs 2 puffs every 6 hours as needed. Historical Provider, MD   traMADol (ULTRAM) 50 MG tablet Take 50 mg by mouth every 6 hours as needed for Pain       Historical Provider, MD   hydroxyzine (VISTARIL) 50 MG capsule Take 50 mg by mouth 3 times daily as needed for Itching       Historical Provider, MD   furosemide (LASIX) 20 MG tablet Take 20 mg by mouth 2 times daily        Historical Provider, MD   tamsulosin (FLOMAX) 0.4 MG capsule Take 0.4 mg by mouth 2 times daily       Historical Provider, MD   lisinopril (PRINIVIL;ZESTRIL) 5 MG tablet Take 5 mg by mouth daily       Historical Provider, MD      ,   Current Hospital Medications   No current facility-administered medications for this encounter. Allergies: Patient has no known allergies. Previous Surgery: percutaneous cath with angioplasty left leg 6/18, t and a, herniorrhaphy         Physical Examination     Constitutional: well nourished      Neurological: oriented times three cn 2-12 intact  Conductive hearing deficit      Eyes: perrla eom intact conj pink non icteric      Lymphatic wnl      Neck/Ear/Nose/Throat: no carotid bruits no masses slight cervical kyphosis tms intact moderate conductive hearing deficit. Cervical khyphoscoliosis. Respiratory: increased ap diameter chest slow expiration no rales no rhonchi      Cardiovascular: rrr no murmurs     Abdomen/GI: soft nor masses no tenderness. normal bowel sounds . Musculoskeletal: symmetrical muscle masses mild dependent oedema lower legs. Chronic dermatitis lower legs. G/U female: na                          G/U male: grossly wnl male age appropriate      Chest/Breasts: negative      Skin/Integumentary: solar keratosis and elastosis    Psychological: droll sense of humor      Other:               Conclusion:  prostatice enlargement with chronic urinary outflow obstruction.       Multiple co morbidities

## 2019-04-03 NOTE — H&P
History and Physical    Patient:  Padmini Connell  MRN: [id number]    CHIEF COMPLAINT:  LUTs    HISTORY OF PRESENT ILLNESS:   The patient is a 80 y.o. male who presents with LUTs. New patient referred by PCP for BPH. Has had progressive worsening LUTS for about 5-10 yrs but much worse for the past year or so. Pt takes Flomax BID for 3-4 yrs. Says it helped for a while but not any more. Pt c/o straining, hesitancy, weak stream, intermittency, frequency, nocturia x 3. Denies gross hematuria, flank pain, dysuria. No frequent UTIs. No hx kidney stones. Most recent renal function studies >1 yr old, Crt 1.37       Past Medical History:    Past Medical History:   Diagnosis Date    Arthritis     COPD (chronic obstructive pulmonary disease) ([address]     Dependent edema     Hyperlipidemia     Hypertension     Psoriasis        Past Surgical History:    Past Surgical History:   Procedure Laterality Date    ANGIOPLASTY [facility] 06/20/2018    [facility] Dr. Martha Drummond -- leg    HERNIA REPAIR      TONSILLECTOMY         Medications Prior to Admission:    Prior to Admission medications    Medication Sig Start Date End Date Taking?  Authorizing Provider   aspirin 81 MG tablet Take 81 mg by mouth 7/7/18   Historical Provider, MD   clopidogrel (PLAVIX) 75 MG tablet  2/8/19   Historical Provider, MD   hydrOXYzine (VISTARIL) 50 MG capsule Take 50 mg by mouth    Historical Provider, MD   Magnesium Oxide 500 MG (LAX) TABS Take 1 tablet by mouth    Historical Provider, MD   B Complex-Biotin-FA (B COMPLETE PO) Take by mouth    Historical Provider, MD   vitamin C (ASCORBIC ACID) 500 MG tablet Take 500 mg by mouth daily    Historical Provider, MD   vitamin D (CHOLECALCIFEROL) 1000 UNIT TABS tablet Take 1,000 Units by mouth daily    Historical Provider, MD   vitamin E 1000 units capsule Take 1,000 Units by mouth daily    Historical Provider, MD   Coenzyme Q10 (CO Q 10 PO) Take by mouth    Historical Provider, MD   Magnesium Citrate 100 No    Drug use: No    Sexual activity: Not on file   Lifestyle    Physical activity:     Days per week: Not on file     Minutes per session: Not on file    Stress: Not on file   Relationships    Social connections:     Talks on phone: Not on file     Gets together: Not on file     Attends Yarsanism service: Not on file     Active member of club or organization: Not on file     Attends meetings of clubs or organizations: Not on file     Relationship status: Not on file    Intimate partner violence:     Fear of current or ex partner: Not on file     Emotionally abused: Not on file     Physically abused: Not on file     Forced sexual activity: Not on file   Other Topics Concern    Not on file   Social History Narrative    Not on file       Family History:  No family history on file. REVIEW OF SYSTEMS:  All systems reviewed and negative except for that already noted in the HPI. Physical Exam:      No data found. Constitutional: Patient in no acute distress; Neuro: alert and oriented to person place and time. Psych: Mood and affect normal.  Skin: Normal  Lungs: Respiratory effort normal  Cardiovascular:  Normal peripheral pulses  Abdomen: Soft, non-tender, non-distended with no CVA, flank pain, hepatosplenomegaly or hernia. Kidneys normal.  Bladder non-tender and not distended. Lymphatics: no palpable lymphadenopathy  Penis normal and circumcised  Urethral meatus normal  Scrotal exam normal  Testicles normal bilaterally  Epididymis normal bilaterally  No evidence of inguinal hernia      LABS:   No results for input(s): WBC, HGB, HCT, MCV, PLT in the last 72 hours. No results for input(s): NA, K, CL, CO2, PHOS, BUN, CREATININE in the last 72 hours.     Invalid input(s): CA  Lab Results   Component Value Date    PSA 3.38 08/29/2012       Additional Lab/culture results:    Urinalysis: No results for input(s): COLORU, PHUR, LABCAST, WBCUA, RBCUA, MUCUS, TRICHOMONAS, YEAST, BACTERIA, CLARITYU, SPECGRAV, LEUKOCYTESUR, UROBILINOGEN, Cleatrice Loud in the last 72 hours.     Invalid input(s): NITRATE, GLUCOSEUKETONESUAMORPHOUS     -----------------------------------------------------------------  Imaging Results:    Assessment and Plan   Impression:    Patient Active Problem List   Diagnosis    Ischemia of extremity    S/P peripheral artery angioplasty    Moderate malnutrition ([address]    Urinary retention    BPH with obstruction/lower urinary tract symptoms    Dysuria    Frequency of micturition    Cellulitis of left leg    Left leg cellulitis       Plan: KIANNA Harden Hy  10:59 AM 4/3/2019

## 2019-04-04 ENCOUNTER — ANESTHESIA (OUTPATIENT)
Dept: OPERATING ROOM | Age: 84
End: 2019-04-04
Payer: MEDICARE

## 2019-04-04 ENCOUNTER — HOSPITAL ENCOUNTER (OUTPATIENT)
Age: 84
Setting detail: OUTPATIENT SURGERY
Discharge: HOME | End: 2019-04-04
Attending: UROLOGY | Admitting: UROLOGY
Payer: MEDICARE

## 2019-04-04 VITALS
SYSTOLIC BLOOD PRESSURE: 105 MMHG | OXYGEN SATURATION: 100 % | RESPIRATION RATE: 13 BRPM | DIASTOLIC BLOOD PRESSURE: 61 MMHG | TEMPERATURE: 98.6 F

## 2019-04-04 VITALS
WEIGHT: 208.7 LBS | HEIGHT: 66 IN | BODY MASS INDEX: 33.54 KG/M2 | OXYGEN SATURATION: 99 % | DIASTOLIC BLOOD PRESSURE: 66 MMHG | SYSTOLIC BLOOD PRESSURE: 144 MMHG | HEART RATE: 51 BPM | RESPIRATION RATE: 18 BRPM | TEMPERATURE: 97 F

## 2019-04-04 PROCEDURE — 2720000010 HC SURG SUPPLY STERILE: Performed by: UROLOGY

## 2019-04-04 PROCEDURE — 7100000010 HC PHASE II RECOVERY - FIRST 15 MIN: Performed by: UROLOGY

## 2019-04-04 PROCEDURE — 2580000003 HC RX 258: Performed by: UROLOGY

## 2019-04-04 PROCEDURE — 3600000004 HC SURGERY LEVEL 4 BASE: Performed by: UROLOGY

## 2019-04-04 PROCEDURE — 2500000003 HC RX 250 WO HCPCS: Performed by: NURSE ANESTHETIST, CERTIFIED REGISTERED

## 2019-04-04 PROCEDURE — 3700000000 HC ANESTHESIA ATTENDED CARE: Performed by: UROLOGY

## 2019-04-04 PROCEDURE — 7100000011 HC PHASE II RECOVERY - ADDTL 15 MIN: Performed by: UROLOGY

## 2019-04-04 PROCEDURE — 2709999900 HC NON-CHARGEABLE SUPPLY: Performed by: UROLOGY

## 2019-04-04 PROCEDURE — 7100000001 HC PACU RECOVERY - ADDTL 15 MIN: Performed by: UROLOGY

## 2019-04-04 PROCEDURE — 3700000001 HC ADD 15 MINUTES (ANESTHESIA): Performed by: UROLOGY

## 2019-04-04 PROCEDURE — 6370000000 HC RX 637 (ALT 250 FOR IP): Performed by: UROLOGY

## 2019-04-04 PROCEDURE — 6360000002 HC RX W HCPCS: Performed by: UROLOGY

## 2019-04-04 PROCEDURE — 94664 DEMO&/EVAL PT USE INHALER: CPT

## 2019-04-04 PROCEDURE — 6360000002 HC RX W HCPCS: Performed by: NURSE ANESTHETIST, CERTIFIED REGISTERED

## 2019-04-04 PROCEDURE — 3600000014 HC SURGERY LEVEL 4 ADDTL 15MIN: Performed by: UROLOGY

## 2019-04-04 PROCEDURE — 7100000000 HC PACU RECOVERY - FIRST 15 MIN: Performed by: UROLOGY

## 2019-04-04 RX ORDER — SODIUM CHLORIDE 0.9 % (FLUSH) 0.9 %
10 SYRINGE (ML) INJECTION PRN
Status: CANCELLED | OUTPATIENT
Start: 2019-04-04

## 2019-04-04 RX ORDER — SODIUM CHLORIDE 0.9 % (FLUSH) 0.9 %
10 SYRINGE (ML) INJECTION PRN
Status: DISCONTINUED | OUTPATIENT
Start: 2019-04-04 | End: 2019-04-04 | Stop reason: HOSPADM

## 2019-04-04 RX ORDER — KETAMINE HYDROCHLORIDE 50 MG/ML
INJECTION, SOLUTION, CONCENTRATE INTRAMUSCULAR; INTRAVENOUS PRN
Status: DISCONTINUED | OUTPATIENT
Start: 2019-04-04 | End: 2019-04-04 | Stop reason: SDUPTHER

## 2019-04-04 RX ORDER — CIPROFLOXACIN 500 MG/1
500 TABLET, FILM COATED ORAL 2 TIMES DAILY
Qty: 14 TABLET | Refills: 0 | Status: SHIPPED | OUTPATIENT
Start: 2019-04-04 | End: 2019-04-11

## 2019-04-04 RX ORDER — FENTANYL CITRATE 50 UG/ML
INJECTION, SOLUTION INTRAMUSCULAR; INTRAVENOUS PRN
Status: DISCONTINUED | OUTPATIENT
Start: 2019-04-04 | End: 2019-04-04 | Stop reason: SDUPTHER

## 2019-04-04 RX ORDER — SODIUM CHLORIDE 0.9 % (FLUSH) 0.9 %
10 SYRINGE (ML) INJECTION EVERY 12 HOURS SCHEDULED
Status: CANCELLED | OUTPATIENT
Start: 2019-04-04

## 2019-04-04 RX ORDER — SODIUM CHLORIDE, SODIUM LACTATE, POTASSIUM CHLORIDE, CALCIUM CHLORIDE 600; 310; 30; 20 MG/100ML; MG/100ML; MG/100ML; MG/100ML
INJECTION, SOLUTION INTRAVENOUS CONTINUOUS
Status: CANCELLED | OUTPATIENT
Start: 2019-04-04

## 2019-04-04 RX ORDER — MIDAZOLAM HYDROCHLORIDE 1 MG/ML
INJECTION INTRAMUSCULAR; INTRAVENOUS PRN
Status: DISCONTINUED | OUTPATIENT
Start: 2019-04-04 | End: 2019-04-04 | Stop reason: SDUPTHER

## 2019-04-04 RX ORDER — ALBUTEROL SULFATE 2.5 MG/3ML
2.5 SOLUTION RESPIRATORY (INHALATION)
Status: COMPLETED | OUTPATIENT
Start: 2019-04-04 | End: 2019-04-04

## 2019-04-04 RX ORDER — DEXAMETHASONE SODIUM PHOSPHATE 10 MG/ML
INJECTION INTRAMUSCULAR; INTRAVENOUS PRN
Status: DISCONTINUED | OUTPATIENT
Start: 2019-04-04 | End: 2019-04-04 | Stop reason: SDUPTHER

## 2019-04-04 RX ORDER — PROPOFOL 10 MG/ML
INJECTION, EMULSION INTRAVENOUS PRN
Status: DISCONTINUED | OUTPATIENT
Start: 2019-04-04 | End: 2019-04-04 | Stop reason: SDUPTHER

## 2019-04-04 RX ORDER — ONDANSETRON 2 MG/ML
INJECTION INTRAMUSCULAR; INTRAVENOUS PRN
Status: DISCONTINUED | OUTPATIENT
Start: 2019-04-04 | End: 2019-04-04 | Stop reason: SDUPTHER

## 2019-04-04 RX ORDER — CIPROFLOXACIN 2 MG/ML
400 INJECTION, SOLUTION INTRAVENOUS
Status: COMPLETED | OUTPATIENT
Start: 2019-04-04 | End: 2019-04-04

## 2019-04-04 RX ORDER — LIDOCAINE HYDROCHLORIDE 10 MG/ML
INJECTION, SOLUTION EPIDURAL; INFILTRATION; INTRACAUDAL; PERINEURAL PRN
Status: DISCONTINUED | OUTPATIENT
Start: 2019-04-04 | End: 2019-04-04 | Stop reason: SDUPTHER

## 2019-04-04 RX ORDER — SODIUM CHLORIDE, SODIUM LACTATE, POTASSIUM CHLORIDE, CALCIUM CHLORIDE 600; 310; 30; 20 MG/100ML; MG/100ML; MG/100ML; MG/100ML
INJECTION, SOLUTION INTRAVENOUS CONTINUOUS
Status: DISCONTINUED | OUTPATIENT
Start: 2019-04-04 | End: 2019-04-04 | Stop reason: HOSPADM

## 2019-04-04 RX ORDER — SODIUM CHLORIDE 0.9 % (FLUSH) 0.9 %
10 SYRINGE (ML) INJECTION EVERY 12 HOURS SCHEDULED
Status: DISCONTINUED | OUTPATIENT
Start: 2019-04-04 | End: 2019-04-04 | Stop reason: HOSPADM

## 2019-04-04 RX ADMIN — LIDOCAINE HYDROCHLORIDE 3 ML: 10 INJECTION, SOLUTION EPIDURAL; INFILTRATION; INTRACAUDAL; PERINEURAL at 08:32

## 2019-04-04 RX ADMIN — MIDAZOLAM HYDROCHLORIDE 1 MG: 2 INJECTION, SOLUTION INTRAMUSCULAR; INTRAVENOUS at 08:17

## 2019-04-04 RX ADMIN — SODIUM CHLORIDE, POTASSIUM CHLORIDE, SODIUM LACTATE AND CALCIUM CHLORIDE: 600; 310; 30; 20 INJECTION, SOLUTION INTRAVENOUS at 09:18

## 2019-04-04 RX ADMIN — DEXAMETHASONE SODIUM PHOSPHATE 10 MG: 10 INJECTION INTRAMUSCULAR; INTRAVENOUS at 08:43

## 2019-04-04 RX ADMIN — FENTANYL CITRATE 25 MCG: 50 INJECTION INTRAMUSCULAR; INTRAVENOUS at 08:52

## 2019-04-04 RX ADMIN — PROPOFOL 100 MG: 10 INJECTION, EMULSION INTRAVENOUS at 08:37

## 2019-04-04 RX ADMIN — ONDANSETRON 4 MG: 2 INJECTION, SOLUTION INTRAMUSCULAR; INTRAVENOUS at 08:41

## 2019-04-04 RX ADMIN — CIPROFLOXACIN 400 MG: 2 INJECTION, SOLUTION INTRAVENOUS at 07:20

## 2019-04-04 RX ADMIN — SODIUM CHLORIDE, POTASSIUM CHLORIDE, SODIUM LACTATE AND CALCIUM CHLORIDE: 600; 310; 30; 20 INJECTION, SOLUTION INTRAVENOUS at 07:20

## 2019-04-04 RX ADMIN — FENTANYL CITRATE 50 MCG: 50 INJECTION INTRAMUSCULAR; INTRAVENOUS at 08:29

## 2019-04-04 RX ADMIN — FENTANYL CITRATE 25 MCG: 50 INJECTION INTRAMUSCULAR; INTRAVENOUS at 08:42

## 2019-04-04 RX ADMIN — KETAMINE HYDROCHLORIDE 25 MG: 50 INJECTION INTRAMUSCULAR; INTRAVENOUS at 08:33

## 2019-04-04 RX ADMIN — ALBUTEROL SULFATE 2.5 MG: 2.5 SOLUTION RESPIRATORY (INHALATION) at 07:41

## 2019-04-04 ASSESSMENT — PAIN - FUNCTIONAL ASSESSMENT: PAIN_FUNCTIONAL_ASSESSMENT: 0-10

## 2019-04-04 ASSESSMENT — PAIN SCALES - GENERAL
PAINLEVEL_OUTOF10: 0

## 2019-04-04 ASSESSMENT — LIFESTYLE VARIABLES: SMOKING_STATUS: 1

## 2019-04-04 NOTE — BRIEF OP NOTE
Brief Postoperative Note  ______________________________________________________________    Patient: Alesha Puente  YOB: 1930  MRN: [id number]  Date of Procedure: 4/4/2019    Pre-Op Diagnosis: BPH    Post-Op Diagnosis: Same       Procedure(s):  CYSTOSCOPY TRANSURETHRAL RESECTION -PROSTATE LASER- PVP GREENLIGHT    Anesthesia: General    Surgeon(s):  Liane Rinne, MD    Assistant: none    Estimated Blood Loss (mL): less than 50     Complications: None    Specimens:   * No specimens in log *    Implants:  * No implants in log *      Drains:   Urethral Catheter Triple-lumen 22 fr (Active)   $ Urethral catheter insertion Inserted for procedure 4/4/2019  9:35 AM   Catheter Indications Urology/Urologist seeing this patient or inserted indwelling catheter 4/4/2019  9:35 AM   Urine Color Pink 4/4/2019  9:35 AM   Urine Appearance Clear 4/4/2019  9:35 AM       Findings: trilobar hyperplasia    Liane Rinne, MD  Date: 4/4/2019  Time: 10:50 AM

## 2019-04-04 NOTE — ANESTHESIA PRE PROCEDURE
1,000 Units by mouth daily    Historical Provider, MD   atorvastatin (LIPITOR) 40 MG tablet Take 40 mg by mouth nightly    Historical Provider, MD   furosemide (LASIX) 20 MG tablet Take 1 tablet by mouth every other day Take on odd days  Patient taking differently: Take 20 mg by mouth daily Take on odd days 7/7/18   Joel Pineda MD   ibuprofen (ADVIL;MOTRIN) 200 MG tablet Take 400 mg by mouth every 6 hours as needed for Pain    Historical Provider, MD       Current medications:    Current Facility-Administered Medications   Medication Dose Route Frequency Provider Last Rate Last Dose    lactated ringers infusion   Intravenous Continuous Nia Castro  mL/hr at 04/04/19 0720      sodium chloride flush 0.9 % injection 10 mL  10 mL Intravenous 2 times per day Nia Castro MD        sodium chloride flush 0.9 % injection 10 mL  10 mL Intravenous PRN Nia Castro MD        ciprofloxacin (CIPRO) IVPB 400 mg  400 mg Intravenous On Call to Angie Dugan  mL/hr at 04/04/19 0720 400 mg at 04/04/19 0720       Allergies:  No Known Allergies    Problem List:    Patient Active Problem List   Diagnosis Code    Ischemia of extremity I99.8    S/P peripheral artery angioplasty Z98.62    Moderate malnutrition ([address] E44.0    Urinary retention R33.9    BPH with obstruction/lower urinary tract symptoms N40.1, N13.8    Dysuria R30.0    Frequency of micturition R35.0    Cellulitis of left leg L03. 116    Left leg cellulitis L03.116       Past Medical History:        Diagnosis Date    Arthritis     COPD (chronic obstructive pulmonary disease) ([address]     Dependent edema     Hyperlipidemia     Hypertension     Psoriasis        Past Surgical History:        Procedure Laterality Date    ANGIOPLASTY Left 06/20/2018    [facility] -- leg    HERNIA REPAIR      NASAL POLYP SURGERY Bilateral     TONSILLECTOMY         Social History:    Social History     Tobacco Use Applicable): No results found for: PREGTESTUR, PREGSERUM, HCG, HCGQUANT     ABGs: No results found for: PHART, PO2ART, KSS7TAN, RUT4IEH, BEART, D2DGMWQS     Type & Screen (If Applicable):  No results found for: LABABO, [address]    Anesthesia Evaluation  Patient summary reviewed and Nursing notes reviewed no history of anesthetic complications:   Airway: Mallampati: II  TM distance: >3 FB   Neck ROM: full  Mouth opening: > = 3 FB Dental:    (+) edentulous      Pulmonary:   (+) COPD:  wheezes,  current smoker          Patient did not smoke on day of surgery. Cardiovascular:    (+) hypertension: no interval change, hyperlipidemia      ECG reviewed               Beta Blocker:  Not on Beta Blocker         Neuro/Psych:   Negative Neuro/Psych ROS              GI/Hepatic/Renal:            ROS comment: bph. Endo/Other:    (+) : arthritis: OA., . Pt had PAT visit. Abdominal:           Vascular:   + PVD, aortic or cerebral, . Anesthesia Plan      general     ASA 4     (Ventolin aerosol preop for expiratory wheezes)      MIPS: Postoperative opioids intended and Prophylactic antiemetics administered. Anesthetic plan and risks discussed with patient and child/children. Plan discussed with attending.                   Ron Chao, RADHA - CRNA   4/4/2019

## 2019-04-05 NOTE — OP NOTE
fiber and ablated the prostate. Began with  bladder neck and worked our away distally. We did use approximately  85,000 joules of energy. Once an adequate TUR defect was obtained, we  did turn out the inflow and obtained hemostasis, we were then able to. Once this was done, we now removed the scope, placed a Beltran catheter  22-Chadian three-way, hand irrigated to clear.   He was then awoken from  general anesthesia, transferred to [facility] and taken to the PACU in  satisfactory condition by nursing and anesthesia team.    PLAN:  The patient will be discharged home per PACU criteria and follow  up with us next week for void trial.        Shandra Maldonado    D: 04/04/2019 12:46:03       T: 04/04/2019 13:55:33     [id number]  Job#: [id number]     Doc#: [id number]    CC:

## 2019-04-08 ENCOUNTER — TELEPHONE (OUTPATIENT)
Dept: UROLOGY | Age: 84
End: 2019-04-08

## 2019-04-08 ENCOUNTER — OFFICE VISIT (OUTPATIENT)
Dept: UROLOGY | Age: 84
End: 2019-04-08
Payer: MEDICARE

## 2019-04-08 VITALS — WEIGHT: 207 LBS | BODY MASS INDEX: 33.41 KG/M2 | SYSTOLIC BLOOD PRESSURE: 100 MMHG | DIASTOLIC BLOOD PRESSURE: 66 MMHG

## 2019-04-08 DIAGNOSIS — N40.1 BPH WITH OBSTRUCTION/LOWER URINARY TRACT SYMPTOMS: Primary | ICD-10-CM

## 2019-04-08 DIAGNOSIS — N13.8 BPH WITH OBSTRUCTION/LOWER URINARY TRACT SYMPTOMS: Primary | ICD-10-CM

## 2019-04-08 PROCEDURE — 99999 PR OFFICE/OUTPT VISIT,PROCEDURE ONLY: CPT | Performed by: UROLOGY

## 2019-04-08 PROCEDURE — 51700 IRRIGATION OF BLADDER: CPT | Performed by: UROLOGY

## 2019-04-08 NOTE — TELEPHONE ENCOUNTER
Patient called to report that he is voiding well since his catheter removed this AM in office. Says his stream is getter stronger and output volume is increasing with each void. Patient advised to go to the ER if he cannot void after 6 hours and/or starts with fevers/chills, nausea/vomiting. Patient voiced understanding.

## 2019-04-10 ENCOUNTER — TELEPHONE (OUTPATIENT)
Dept: UROLOGY | Age: 84
End: 2019-04-10

## 2019-04-25 ENCOUNTER — OFFICE VISIT (OUTPATIENT)
Dept: UROLOGY | Age: 84
End: 2019-04-25
Payer: MEDICARE

## 2019-04-25 ENCOUNTER — HOSPITAL ENCOUNTER (OUTPATIENT)
Age: 84
Setting detail: SPECIMEN
Discharge: HOME | End: 2019-04-25
Payer: MEDICARE

## 2019-04-25 VITALS
DIASTOLIC BLOOD PRESSURE: 68 MMHG | SYSTOLIC BLOOD PRESSURE: 140 MMHG | WEIGHT: 209 LBS | BODY MASS INDEX: 32.8 KG/M2 | HEIGHT: 67 IN

## 2019-04-25 DIAGNOSIS — R35.1 NOCTURIA: ICD-10-CM

## 2019-04-25 DIAGNOSIS — Z98.890 POST-OPERATIVE STATE: ICD-10-CM

## 2019-04-25 DIAGNOSIS — N40.1 BPH WITH OBSTRUCTION/LOWER URINARY TRACT SYMPTOMS: Primary | ICD-10-CM

## 2019-04-25 DIAGNOSIS — N40.1 BPH WITH OBSTRUCTION/LOWER URINARY TRACT SYMPTOMS: ICD-10-CM

## 2019-04-25 DIAGNOSIS — N13.8 BPH WITH OBSTRUCTION/LOWER URINARY TRACT SYMPTOMS: ICD-10-CM

## 2019-04-25 DIAGNOSIS — R35.0 FREQUENCY OF URINATION: ICD-10-CM

## 2019-04-25 DIAGNOSIS — N13.8 BPH WITH OBSTRUCTION/LOWER URINARY TRACT SYMPTOMS: Primary | ICD-10-CM

## 2019-04-25 LAB
-: ABNORMAL
AMORPHOUS: ABNORMAL
BACTERIA: ABNORMAL
BILIRUBIN URINE: NEGATIVE
CASTS UA: ABNORMAL /LPF
COLOR: YELLOW
COMMENT UA: ABNORMAL
CRYSTALS, UA: ABNORMAL /HPF
EPITHELIAL CELLS UA: ABNORMAL /HPF
GLUCOSE URINE: NEGATIVE
KETONES, URINE: NEGATIVE
LEUKOCYTE ESTERASE, URINE: ABNORMAL
MUCUS: ABNORMAL
NITRITE, URINE: NEGATIVE
OTHER OBSERVATIONS UA: ABNORMAL
PH UA: 7 (ref 5–8)
PROTEIN UA: ABNORMAL
RBC UA: ABNORMAL /HPF (ref 0–2)
RENAL EPITHELIAL, UA: ABNORMAL /HPF
SPECIFIC GRAVITY UA: 1.01 (ref 1–1.03)
TRICHOMONAS: ABNORMAL
TURBIDITY: ABNORMAL
URINE HGB: ABNORMAL
UROBILINOGEN, URINE: NORMAL
WBC UA: ABNORMAL /HPF
YEAST: ABNORMAL

## 2019-04-25 PROCEDURE — 81001 URINALYSIS AUTO W/SCOPE: CPT

## 2019-04-25 PROCEDURE — 99024 POSTOP FOLLOW-UP VISIT: CPT | Performed by: NURSE PRACTITIONER

## 2019-04-25 PROCEDURE — 87077 CULTURE AEROBIC IDENTIFY: CPT

## 2019-04-25 PROCEDURE — 87086 URINE CULTURE/COLONY COUNT: CPT

## 2019-04-25 PROCEDURE — 51798 US URINE CAPACITY MEASURE: CPT | Performed by: NURSE PRACTITIONER

## 2019-04-25 RX ORDER — TRAMADOL HYDROCHLORIDE 50 MG/1
TABLET ORAL
Refills: 0 | COMMUNITY
Start: 2019-03-27 | End: 2019-06-10 | Stop reason: ALTCHOICE

## 2019-04-25 RX ORDER — HYDROXYZINE 50 MG/1
TABLET, FILM COATED ORAL
Refills: 5 | COMMUNITY
Start: 2019-03-31 | End: 2019-04-25 | Stop reason: SDUPTHER

## 2019-04-25 NOTE — PROGRESS NOTES
History  Referred by PCP for BPH. Has had progressive worsening LUTS for about 5-10 yrs but much worse for the past year or so. Takes Flomax BID for 3-4 years. Complaints of straining, hesitancy, weak stream, intermittency, frequency, nocturia x 3.     3/2019 Cystoscopy showed trilobar hyperplasia    4/2019 PVP greenlight    Pt 2 week(s) post op Greenlight PVP    Fever/chills - no  Nausea/vomiting - no  Hematuria - No  Pain - mild, dysuria    Still taking BPH meds? yes - flomax  Back on anticoagulation? yes - plavix    He denies frequency. PVR 115ml    Is having daily BMs    Plan:  Continue flomax. UACS today. F/U in 4 weeks.

## 2019-04-25 NOTE — PATIENT INSTRUCTIONS
SURVEY:    You may be receiving a survey from [employer] regarding your visit today. Please complete the survey to enable us to provide the highest quality of care to you and your family. If you cannot score us a very good on any question, please call the office to discuss how we could have made your experience a very good one. Thank you.

## 2019-04-27 LAB
CULTURE: ABNORMAL
Lab: ABNORMAL
SPECIMEN DESCRIPTION: ABNORMAL

## 2019-04-29 ENCOUNTER — TELEPHONE (OUTPATIENT)
Dept: UROLOGY | Age: 84
End: 2019-04-29

## 2019-04-29 RX ORDER — CEPHALEXIN 500 MG/1
500 CAPSULE ORAL 2 TIMES DAILY
Qty: 14 CAPSULE | Refills: 0 | Status: SHIPPED | OUTPATIENT
Start: 2019-04-29 | End: 2019-05-06

## 2019-06-06 ENCOUNTER — HOSPITAL ENCOUNTER (OUTPATIENT)
Age: 84
Setting detail: SPECIMEN
Discharge: HOME | End: 2019-06-06
Payer: MEDICARE

## 2019-06-06 ENCOUNTER — OFFICE VISIT (OUTPATIENT)
Dept: UROLOGY | Age: 84
End: 2019-06-06
Payer: MEDICARE

## 2019-06-06 VITALS
WEIGHT: 213 LBS | DIASTOLIC BLOOD PRESSURE: 90 MMHG | BODY MASS INDEX: 33.43 KG/M2 | SYSTOLIC BLOOD PRESSURE: 140 MMHG | HEIGHT: 67 IN

## 2019-06-06 DIAGNOSIS — N40.1 BPH WITH OBSTRUCTION/LOWER URINARY TRACT SYMPTOMS: ICD-10-CM

## 2019-06-06 DIAGNOSIS — N13.8 BPH WITH OBSTRUCTION/LOWER URINARY TRACT SYMPTOMS: Primary | ICD-10-CM

## 2019-06-06 DIAGNOSIS — N40.1 BPH WITH OBSTRUCTION/LOWER URINARY TRACT SYMPTOMS: Primary | ICD-10-CM

## 2019-06-06 DIAGNOSIS — N13.8 BPH WITH OBSTRUCTION/LOWER URINARY TRACT SYMPTOMS: ICD-10-CM

## 2019-06-06 DIAGNOSIS — R35.1 NOCTURIA: ICD-10-CM

## 2019-06-06 DIAGNOSIS — R33.9 URINARY RETENTION: ICD-10-CM

## 2019-06-06 DIAGNOSIS — R35.0 FREQUENCY OF URINATION: ICD-10-CM

## 2019-06-06 DIAGNOSIS — Z98.890 POST-OPERATIVE STATE: ICD-10-CM

## 2019-06-06 LAB
-: ABNORMAL
AMORPHOUS: ABNORMAL
BACTERIA: ABNORMAL
BILIRUBIN URINE: NEGATIVE
CASTS UA: ABNORMAL /LPF
COLOR: YELLOW
COMMENT UA: ABNORMAL
CRYSTALS, UA: ABNORMAL /HPF
EPITHELIAL CELLS UA: ABNORMAL /HPF
GLUCOSE URINE: NEGATIVE
KETONES, URINE: ABNORMAL
LEUKOCYTE ESTERASE, URINE: ABNORMAL
MUCUS: ABNORMAL
NITRITE, URINE: NEGATIVE
OTHER OBSERVATIONS UA: ABNORMAL
PH UA: 5 (ref 5–8)
PROTEIN UA: ABNORMAL
RBC UA: ABNORMAL /HPF (ref 0–2)
RENAL EPITHELIAL, UA: ABNORMAL /HPF
SPECIFIC GRAVITY UA: 1.02 (ref 1–1.03)
TRICHOMONAS: ABNORMAL
TURBIDITY: ABNORMAL
URINE HGB: ABNORMAL
UROBILINOGEN, URINE: NORMAL
WBC UA: ABNORMAL /HPF
YEAST: ABNORMAL

## 2019-06-06 PROCEDURE — 81001 URINALYSIS AUTO W/SCOPE: CPT

## 2019-06-06 PROCEDURE — 51798 US URINE CAPACITY MEASURE: CPT | Performed by: NURSE PRACTITIONER

## 2019-06-06 PROCEDURE — 99024 POSTOP FOLLOW-UP VISIT: CPT | Performed by: NURSE PRACTITIONER

## 2019-06-06 PROCEDURE — 87077 CULTURE AEROBIC IDENTIFY: CPT

## 2019-06-06 PROCEDURE — 87086 URINE CULTURE/COLONY COUNT: CPT

## 2019-06-06 RX ORDER — TAMSULOSIN HYDROCHLORIDE 0.4 MG/1
0.4 CAPSULE ORAL DAILY
Qty: 30 CAPSULE | Refills: 2 | Status: ON HOLD | OUTPATIENT
Start: 2019-06-06 | End: 2021-03-30 | Stop reason: SDUPTHER

## 2019-06-06 NOTE — PROGRESS NOTES
History  Referred by PCP for BPH. Has had progressive worsening LUTS for about 5-10 yrs but much worse for the past year or so. Takes Flomax BID for 3-4 years. Complaints of straining, hesitancy, weak stream, intermittency, frequency, nocturia x 3.     3/2019 Cystoscopy showed trilobar hyperplasia    4/2019 PVP greenlight    Today  Here today status post PVP greenlight. He denies frequency, urgency, weak stream, straining to urinate, dysuria or gross hematuria. PVR is low. Plan  We will decrease Flomax to once per day. We will check a UA C&S today. We will have him back in 4-6 weeks or sooner if needed.

## 2019-06-06 NOTE — PATIENT INSTRUCTIONS
SURVEY:    You may be receiving a survey from [employer] regarding your visit today. Please complete the survey to enable us to provide the highest quality of care to you and your family. If you cannot score us a very good on any question, please call the office to discuss how we could have made your experience a very good one. Thank you.

## 2019-06-08 LAB
CULTURE: ABNORMAL
Lab: ABNORMAL
SPECIMEN DESCRIPTION: ABNORMAL

## 2019-06-10 ENCOUNTER — HOSPITAL ENCOUNTER (EMERGENCY)
Age: 84
Discharge: ANOTHER HOSPITAL | End: 2019-06-10
Attending: EMERGENCY MEDICINE
Payer: MEDICARE

## 2019-06-10 ENCOUNTER — TELEPHONE (OUTPATIENT)
Dept: UROLOGY | Age: 84
End: 2019-06-10

## 2019-06-10 ENCOUNTER — APPOINTMENT (OUTPATIENT)
Dept: GENERAL RADIOLOGY | Age: 84
End: 2019-06-10
Payer: MEDICARE

## 2019-06-10 VITALS
WEIGHT: 214 LBS | BODY MASS INDEX: 34.39 KG/M2 | HEART RATE: 98 BPM | RESPIRATION RATE: 20 BRPM | SYSTOLIC BLOOD PRESSURE: 93 MMHG | OXYGEN SATURATION: 93 % | TEMPERATURE: 98.1 F | DIASTOLIC BLOOD PRESSURE: 59 MMHG | HEIGHT: 66 IN

## 2019-06-10 DIAGNOSIS — I82.4Z1 ACUTE DEEP VEIN THROMBOSIS (DVT) OF DISTAL END OF RIGHT LOWER EXTREMITY (HCC): ICD-10-CM

## 2019-06-10 DIAGNOSIS — N39.0 URINARY TRACT INFECTION WITHOUT HEMATURIA, SITE UNSPECIFIED: ICD-10-CM

## 2019-06-10 DIAGNOSIS — L03.115 CELLULITIS OF RIGHT LOWER EXTREMITY: Primary | ICD-10-CM

## 2019-06-10 LAB
-: ABNORMAL
ABSOLUTE EOS #: 0 K/UL (ref 0–0.4)
ABSOLUTE IMMATURE GRANULOCYTE: ABNORMAL K/UL (ref 0–0.3)
ABSOLUTE LYMPH #: 0.68 K/UL (ref 1–4.8)
ABSOLUTE MONO #: 0.46 K/UL (ref 0–1)
ALBUMIN SERPL-MCNC: 4.3 G/DL (ref 3.5–5.2)
ALBUMIN/GLOBULIN RATIO: ABNORMAL (ref 1–2.5)
ALP BLD-CCNC: 87 U/L (ref 40–129)
ALT SERPL-CCNC: 27 U/L (ref 5–41)
AMORPHOUS: ABNORMAL
ANION GAP SERPL CALCULATED.3IONS-SCNC: 13 MMOL/L (ref 9–17)
AST SERPL-CCNC: 27 U/L
BACTERIA: ABNORMAL
BASOPHILS # BLD: 0 % (ref 0–2)
BASOPHILS ABSOLUTE: 0 K/UL (ref 0–0.2)
BILIRUB SERPL-MCNC: 0.69 MG/DL (ref 0.3–1.2)
BILIRUBIN URINE: NEGATIVE
BUN BLDV-MCNC: 23 MG/DL (ref 8–23)
BUN/CREAT BLD: 17 (ref 9–20)
CALCIUM SERPL-MCNC: 10.3 MG/DL (ref 8.6–10.4)
CASTS UA: ABNORMAL /LPF
CHLORIDE BLD-SCNC: 102 MMOL/L (ref 98–107)
CO2: 27 MMOL/L (ref 20–31)
COLOR: YELLOW
COMMENT UA: ABNORMAL
CREAT SERPL-MCNC: 1.35 MG/DL (ref 0.7–1.2)
CRYSTALS, UA: ABNORMAL /HPF
D-DIMER QUANTITATIVE: 3.94 MG/L FEU (ref 0–0.5)
DIFFERENTIAL TYPE: ABNORMAL
EOSINOPHILS RELATIVE PERCENT: 0 % (ref 0–5)
EPITHELIAL CELLS UA: ABNORMAL /HPF
GFR AFRICAN AMERICAN: >60 ML/MIN
GFR NON-AFRICAN AMERICAN: 50 ML/MIN
GFR SERPL CREATININE-BSD FRML MDRD: ABNORMAL ML/MIN/{1.73_M2}
GFR SERPL CREATININE-BSD FRML MDRD: ABNORMAL ML/MIN/{1.73_M2}
GLUCOSE BLD-MCNC: 136 MG/DL (ref 70–99)
GLUCOSE URINE: NEGATIVE
HCT VFR BLD CALC: 44.6 % (ref 41–53)
HEMOGLOBIN: 14.8 G/DL (ref 13.5–17.5)
IMMATURE GRANULOCYTES: ABNORMAL %
KETONES, URINE: NEGATIVE
LACTIC ACID: 2.6 MMOL/L (ref 0.5–2.2)
LEUKOCYTE ESTERASE, URINE: ABNORMAL
LYMPHOCYTES # BLD: 6 % (ref 13–44)
MCH RBC QN AUTO: 30.3 PG (ref 26–34)
MCHC RBC AUTO-ENTMCNC: 33.3 G/DL (ref 31–37)
MCV RBC AUTO: 91 FL (ref 80–100)
MONOCYTES # BLD: 4 % (ref 5–9)
MORPHOLOGY: ABNORMAL
MUCUS: ABNORMAL
NITRITE, URINE: NEGATIVE
NRBC AUTOMATED: ABNORMAL PER 100 WBC
OTHER OBSERVATIONS UA: ABNORMAL
PDW BLD-RTO: 15 % (ref 12.1–15.2)
PH UA: 6 (ref 5–8)
PLATELET # BLD: 189 K/UL (ref 140–450)
PLATELET ESTIMATE: ABNORMAL
PMV BLD AUTO: ABNORMAL FL (ref 6–12)
POTASSIUM SERPL-SCNC: 4.2 MMOL/L (ref 3.7–5.3)
PROTEIN UA: ABNORMAL
RBC # BLD: 4.9 M/UL (ref 4.5–5.9)
RBC # BLD: ABNORMAL 10*6/UL
RBC UA: ABNORMAL /HPF (ref 0–2)
RENAL EPITHELIAL, UA: ABNORMAL /HPF
SEG NEUTROPHILS: 90 % (ref 39–75)
SEGMENTED NEUTROPHILS ABSOLUTE COUNT: 10.26 K/UL (ref 2.1–6.5)
SODIUM BLD-SCNC: 142 MMOL/L (ref 135–144)
SPECIFIC GRAVITY UA: 1.01 (ref 1–1.03)
TOTAL PROTEIN: 7.7 G/DL (ref 6.4–8.3)
TRICHOMONAS: ABNORMAL
TURBIDITY: CLEAR
URINE HGB: ABNORMAL
UROBILINOGEN, URINE: NORMAL
WBC # BLD: 11.4 K/UL (ref 3.5–11)
WBC # BLD: ABNORMAL 10*3/UL
WBC UA: ABNORMAL /HPF
YEAST: ABNORMAL

## 2019-06-10 PROCEDURE — 96375 TX/PRO/DX INJ NEW DRUG ADDON: CPT

## 2019-06-10 PROCEDURE — 87086 URINE CULTURE/COLONY COUNT: CPT

## 2019-06-10 PROCEDURE — 73610 X-RAY EXAM OF ANKLE: CPT

## 2019-06-10 PROCEDURE — 87186 SC STD MICRODIL/AGAR DIL: CPT

## 2019-06-10 PROCEDURE — 96372 THER/PROPH/DIAG INJ SC/IM: CPT

## 2019-06-10 PROCEDURE — 85379 FIBRIN DEGRADATION QUANT: CPT

## 2019-06-10 PROCEDURE — 73620 X-RAY EXAM OF FOOT: CPT

## 2019-06-10 PROCEDURE — 81001 URINALYSIS AUTO W/SCOPE: CPT

## 2019-06-10 PROCEDURE — 80053 COMPREHEN METABOLIC PANEL: CPT

## 2019-06-10 PROCEDURE — 96365 THER/PROPH/DIAG IV INF INIT: CPT

## 2019-06-10 PROCEDURE — 71045 X-RAY EXAM CHEST 1 VIEW: CPT

## 2019-06-10 PROCEDURE — 96376 TX/PRO/DX INJ SAME DRUG ADON: CPT

## 2019-06-10 PROCEDURE — 87077 CULTURE AEROBIC IDENTIFY: CPT

## 2019-06-10 PROCEDURE — 99284 EMERGENCY DEPT VISIT MOD MDM: CPT

## 2019-06-10 PROCEDURE — 85025 COMPLETE CBC W/AUTO DIFF WBC: CPT

## 2019-06-10 PROCEDURE — 73600 X-RAY EXAM OF ANKLE: CPT

## 2019-06-10 PROCEDURE — 87040 BLOOD CULTURE FOR BACTERIA: CPT

## 2019-06-10 PROCEDURE — 87205 SMEAR GRAM STAIN: CPT

## 2019-06-10 PROCEDURE — 83605 ASSAY OF LACTIC ACID: CPT

## 2019-06-10 PROCEDURE — 2580000003 HC RX 258: Performed by: EMERGENCY MEDICINE

## 2019-06-10 PROCEDURE — 6360000002 HC RX W HCPCS: Performed by: EMERGENCY MEDICINE

## 2019-06-10 RX ORDER — FUROSEMIDE 20 MG/1
20 TABLET ORAL DAILY
Status: ON HOLD | COMMUNITY
End: 2021-03-30 | Stop reason: SDUPTHER

## 2019-06-10 RX ORDER — AMPICILLIN 500 MG/1
500 CAPSULE ORAL 3 TIMES DAILY
Qty: 30 CAPSULE | Refills: 0 | Status: SHIPPED | OUTPATIENT
Start: 2019-06-10 | End: 2019-06-10 | Stop reason: ALTCHOICE

## 2019-06-10 RX ORDER — MORPHINE SULFATE 4 MG/ML
2 INJECTION, SOLUTION INTRAMUSCULAR; INTRAVENOUS ONCE
Status: COMPLETED | OUTPATIENT
Start: 2019-06-10 | End: 2019-06-10

## 2019-06-10 RX ORDER — MORPHINE SULFATE 4 MG/ML
4 INJECTION, SOLUTION INTRAMUSCULAR; INTRAVENOUS ONCE
Status: COMPLETED | OUTPATIENT
Start: 2019-06-10 | End: 2019-06-10

## 2019-06-10 RX ADMIN — VANCOMYCIN HYDROCHLORIDE 1000 MG: 1 INJECTION, POWDER, LYOPHILIZED, FOR SOLUTION INTRAVENOUS at 22:04

## 2019-06-10 RX ADMIN — PIPERACILLIN SODIUM AND TAZOBACTAM SODIUM 3.38 G: 3; .375 INJECTION, POWDER, LYOPHILIZED, FOR SOLUTION INTRAVENOUS at 21:26

## 2019-06-10 RX ADMIN — ENOXAPARIN SODIUM 90 MG: 100 INJECTION SUBCUTANEOUS at 21:17

## 2019-06-10 RX ADMIN — MORPHINE SULFATE 4 MG: 4 INJECTION INTRAVENOUS at 21:18

## 2019-06-10 RX ADMIN — MORPHINE SULFATE 2 MG: 4 INJECTION INTRAVENOUS at 19:00

## 2019-06-10 ASSESSMENT — PAIN DESCRIPTION - LOCATION: LOCATION: LEG

## 2019-06-10 ASSESSMENT — PAIN DESCRIPTION - DESCRIPTORS: DESCRIPTORS: BURNING

## 2019-06-10 ASSESSMENT — PAIN SCALES - GENERAL
PAINLEVEL_OUTOF10: 7
PAINLEVEL_OUTOF10: 7
PAINLEVEL_OUTOF10: 10
PAINLEVEL_OUTOF10: 10

## 2019-06-10 ASSESSMENT — PAIN DESCRIPTION - FREQUENCY: FREQUENCY: CONTINUOUS

## 2019-06-10 ASSESSMENT — PAIN DESCRIPTION - PAIN TYPE
TYPE: ACUTE PAIN
TYPE: ACUTE PAIN

## 2019-06-10 ASSESSMENT — PAIN DESCRIPTION - ORIENTATION: ORIENTATION: RIGHT

## 2019-06-10 ASSESSMENT — PAIN DESCRIPTION - PROGRESSION: CLINICAL_PROGRESSION: NOT CHANGED

## 2019-06-10 NOTE — ED PROVIDER NOTES
daily       hydrOXYzine (VISTARIL) 50 MG capsule Take 50 mg by mouth every 6 hours as needed       Magnesium Oxide 500 MG (LAX) TABS Take 1 tablet by mouth      B Complex-Biotin-FA (B COMPLETE PO) Take 1 tablet by mouth daily       vitamin C (ASCORBIC ACID) 500 MG tablet Take 500 mg by mouth daily      vitamin D (CHOLECALCIFEROL) 1000 UNIT TABS tablet Take 1,000 Units by mouth daily      vitamin E 1000 units capsule Take 1,000 Units by mouth daily      Coenzyme Q10 (CO Q 10 PO) Take 1 tablet by mouth daily       Magnesium Citrate 100 MG TABS Take 100 mg by mouth daily      atorvastatin (LIPITOR) 40 MG tablet Take 40 mg by mouth nightly      melatonin 5 MG TABS tablet Take 5 mg by mouth daily as needed      ASHWAGANDHA PO Take 570 mg by mouth daily      BOSWELLIA HUGH PO Take 250 mg by mouth daily      Homeopathic Products (HOMEOPATHIC CALM SL) Take 1 tablet by mouth daily      ibuprofen (ADVIL;MOTRIN) 200 MG tablet Take 400 mg by mouth every 6 hours as needed for Pain      Albuterol Sulfate (PROAIR HFA IN) Inhale into the lungs 2 puffs every 6 hours as needed.  lisinopril (PRINIVIL;ZESTRIL) 5 MG tablet Take 5 mg by mouth daily         ALLERGIES    No Known Allergies    FAMILY HISTORY    History reviewed. No pertinent family history. SOCIAL HISTORY    Social History     Socioeconomic History    Marital status:      Spouse name: None    Number of children: None    Years of education: None    Highest education level: None   Occupational History    None   Social Needs    Financial resource strain: None    Food insecurity:     Worry: None     Inability: None    Transportation needs:     Medical: None     Non-medical: None   Tobacco Use    Smoking status: Current Every Day Smoker     Packs/day: 2.00     Years: 70.00     Pack years: 140.00     Types: Pipe    Smokeless tobacco: Never Used    Tobacco comment: declines couseling    Substance and Sexual Activity    Alcohol use:  No osteomyelitis. Labs  Labs Reviewed   CBC WITH AUTO DIFFERENTIAL - Abnormal; Notable for the following components:       Result Value    WBC 11.4 (*)     Seg Neutrophils 90 (*)     Lymphocytes 6 (*)     Monocytes 4 (*)     Segs Absolute 10.26 (*)     Absolute Lymph # 0.68 (*)     All other components within normal limits   COMPREHENSIVE METABOLIC PANEL - Abnormal; Notable for the following components:    Glucose 136 (*)     CREATININE 1.35 (*)     GFR Non- 50 (*)     All other components within normal limits   D-DIMER, QUANTITATIVE - Abnormal; Notable for the following components:    D-Dimer, Quant 3.94 (*)     All other components within normal limits   URINALYSIS, CHEM - Abnormal; Notable for the following components:    Urine Hgb TRACE (*)     Protein, UA 1+ (*)     Leukocyte Esterase, Urine 3+ (*)     All other components within normal limits   MICROSCOPIC URINALYSIS - Abnormal; Notable for the following components:    Bacteria, UA 2+ (*)     All other components within normal limits   CULTURE BLOOD #1   URINE CULTURE             Summation      Patient Course: Patient is cussed with Dr. Jai Moscoso. He has previous history of vascular disease, atherosclerosis. Patient will be transferred to [facility] per family request.  Patient has established vascular surgeon. To transfer patient is stable condition. Patient is given vancomycin and Zosyn. Patient is given a gram per kilogram Lovenox subcu for suspected DVT. Patient was given morphine for pain.   ED Medications administered this visit:    Medications   vancomycin 1000 mg IVPB in 250 mL D5W addavial (has no administration in time range)   piperacillin-tazobactam (ZOSYN) 3.375 g in dextrose 5 % 50 mL IVPB (mini-bag) (has no administration in time range)   enoxaparin (LOVENOX) injection 90 mg (has no administration in time range)   morphine sulfate (PF) injection 2 mg (2 mg Intravenous Given 6/10/19 1900)       New Prescriptions from

## 2019-06-11 NOTE — DISCHARGE INSTR - COC
***    Readmission Risk Assessment Score:  Readmission Risk              Risk of Unplanned Readmission:        0           Discharging to Facility/ Agency   · Name:   · Address:  · Phone:  · Fax:    Dialysis Facility (if applicable)   · Name:  · Address:  · Dialysis Schedule:  · Phone:  · Fax:    / signature: {Esignature:[id number]}    PHYSICIAN SECTION    Prognosis: {Prognosis:0879610886}    Condition at Discharge: [address] [place] [address] Patient Condition:[id number]}    Rehab Potential (if transferring to Rehab): {Prognosis:2985039095}    Recommended Labs or Other Treatments After Discharge: ***    Physician Certification: I certify the above information and transfer of Ever Overall  is necessary for the continuing treatment of the diagnosis listed and that he requires {Admit to Appropriate Level of Care:86072} for {GREATER/LESS:760252370} 30 days.      Update Admission H&P: {CHP DME Changes in OBPEL:637932778}    PHYSICIAN SIGNATURE:  {Esignature:[id number]}

## 2019-06-11 NOTE — PROGRESS NOTES
6/11/2019 @ [phone] 14 90 --- [facility]

## 2019-06-11 NOTE — TELEPHONE ENCOUNTER
Patient's daughter called back stating antibiotic not necessary. She stated patient was seen in [facility] ER yesterday and was transferred to ICU at [facility] in [place].   She also stated to cancel his next appointment in our office - she stated he will follow with PCP - Dr. Harpreet Rayo

## 2019-06-12 LAB
CULTURE: ABNORMAL
Lab: ABNORMAL
SPECIMEN DESCRIPTION: ABNORMAL

## 2019-06-12 NOTE — TELEPHONE ENCOUNTER
He is in the post-op period. He should continue follow-up with urology. Not doing so would be against medical advice.  We can schedule follow-up later after he has recovered from the hospital.

## 2019-06-12 NOTE — TELEPHONE ENCOUNTER
Called patient's daughter and informed her of response. Advised daughter to call office after patient out of hospital in [place] to schedule follow up appointment. Daughter voiced understanding.

## 2019-06-14 LAB
CULTURE: ABNORMAL
Lab: ABNORMAL
SPECIMEN DESCRIPTION: ABNORMAL

## 2020-10-28 ENCOUNTER — APPOINTMENT (OUTPATIENT)
Dept: GENERAL RADIOLOGY | Age: 85
DRG: 603 | End: 2020-10-28
Attending: SURGERY
Payer: MEDICARE

## 2020-10-28 ENCOUNTER — HOSPITAL ENCOUNTER (OUTPATIENT)
Age: 85
Setting detail: SPECIMEN
Discharge: HOME | End: 2020-10-28
Payer: MEDICARE

## 2020-10-28 ENCOUNTER — HOSPITAL ENCOUNTER (INPATIENT)
Age: 85
LOS: 3 days | Discharge: NURSING, REHAB OR LONG-TERM CARE | DRG: 603 | End: 2020-10-31
Attending: SURGERY | Admitting: SURGERY
Payer: MEDICARE

## 2020-10-28 PROBLEM — L03.90 CELLULITIS: Status: ACTIVE | Noted: 2020-10-28

## 2020-10-28 LAB
ABSOLUTE EOS #: 0.23 K/UL (ref 0–0.4)
ABSOLUTE IMMATURE GRANULOCYTE: ABNORMAL K/UL (ref 0–0.3)
ABSOLUTE LYMPH #: 1.25 K/UL (ref 1–4.8)
ABSOLUTE MONO #: 1.09 K/UL (ref 0–1)
ANION GAP SERPL CALCULATED.3IONS-SCNC: 8 MMOL/L (ref 9–17)
BASOPHILS # BLD: ABNORMAL % (ref 0–2)
BASOPHILS ABSOLUTE: ABNORMAL K/UL (ref 0–0.2)
BILIRUBIN URINE: NEGATIVE
BUN BLDV-MCNC: 47 MG/DL (ref 8–23)
BUN/CREAT BLD: 32 (ref 9–20)
CALCIUM SERPL-MCNC: 9.6 MG/DL (ref 8.6–10.4)
CHLORIDE BLD-SCNC: 103 MMOL/L (ref 98–107)
CO2: 31 MMOL/L (ref 20–31)
COLOR: YELLOW
COMMENT UA: NORMAL
CREAT SERPL-MCNC: 1.45 MG/DL (ref 0.7–1.2)
EOSINOPHILS RELATIVE PERCENT: 3 % (ref 0–5)
GFR AFRICAN AMERICAN: 55 ML/MIN
GFR NON-AFRICAN AMERICAN: 46 ML/MIN
GFR SERPL CREATININE-BSD FRML MDRD: ABNORMAL ML/MIN/{1.73_M2}
GFR SERPL CREATININE-BSD FRML MDRD: ABNORMAL ML/MIN/{1.73_M2}
GLUCOSE BLD-MCNC: 223 MG/DL (ref 70–99)
GLUCOSE URINE: NEGATIVE
HCT VFR BLD CALC: 36 % (ref 41–53)
HEMOGLOBIN: 11.9 G/DL (ref 13.5–17.5)
IMMATURE GRANULOCYTES: ABNORMAL %
KETONES, URINE: NEGATIVE
LEUKOCYTE ESTERASE, URINE: NEGATIVE
LYMPHOCYTES # BLD: 16 % (ref 13–44)
MCH RBC QN AUTO: 29.9 PG (ref 26–34)
MCHC RBC AUTO-ENTMCNC: 33.1 G/DL (ref 31–37)
MCV RBC AUTO: 90.3 FL (ref 80–100)
MONOCYTES # BLD: 14 % (ref 5–9)
MORPHOLOGY: ABNORMAL
NITRITE, URINE: NEGATIVE
NRBC AUTOMATED: ABNORMAL PER 100 WBC
PDW BLD-RTO: 15.6 % (ref 12.1–15.2)
PH UA: 6.5 (ref 5–8)
PLATELET # BLD: 232 K/UL (ref 140–450)
PLATELET ESTIMATE: ABNORMAL
PMV BLD AUTO: ABNORMAL FL (ref 6–12)
POTASSIUM SERPL-SCNC: 3.9 MMOL/L (ref 3.7–5.3)
PROTEIN UA: NEGATIVE
RBC # BLD: 3.99 M/UL (ref 4.5–5.9)
RBC # BLD: ABNORMAL 10*6/UL
SARS-COV-2, RAPID: NOT DETECTED
SARS-COV-2: NORMAL
SARS-COV-2: NORMAL
SEG NEUTROPHILS: 67 % (ref 39–75)
SEGMENTED NEUTROPHILS ABSOLUTE COUNT: 5.23 K/UL (ref 2.1–6.5)
SODIUM BLD-SCNC: 142 MMOL/L (ref 135–144)
SOURCE: NORMAL
SPECIFIC GRAVITY UA: 1.01 (ref 1–1.03)
TURBIDITY: CLEAR
URINE HGB: NEGATIVE
UROBILINOGEN, URINE: NORMAL
WBC # BLD: 7.8 K/UL (ref 3.5–11)
WBC # BLD: ABNORMAL 10*3/UL

## 2020-10-28 PROCEDURE — 85027 COMPLETE CBC AUTOMATED: CPT

## 2020-10-28 PROCEDURE — U0002 COVID-19 LAB TEST NON-CDC: HCPCS

## 2020-10-28 PROCEDURE — 71046 X-RAY EXAM CHEST 2 VIEWS: CPT

## 2020-10-28 PROCEDURE — 2580000003 HC RX 258: Performed by: SURGERY

## 2020-10-28 PROCEDURE — 6360000002 HC RX W HCPCS: Performed by: SURGERY

## 2020-10-28 PROCEDURE — 87205 SMEAR GRAM STAIN: CPT

## 2020-10-28 PROCEDURE — 85007 BL SMEAR W/DIFF WBC COUNT: CPT

## 2020-10-28 PROCEDURE — 93005 ELECTROCARDIOGRAM TRACING: CPT | Performed by: SURGERY

## 2020-10-28 PROCEDURE — 86403 PARTICLE AGGLUT ANTBDY SCRN: CPT

## 2020-10-28 PROCEDURE — 80048 BASIC METABOLIC PNL TOTAL CA: CPT

## 2020-10-28 PROCEDURE — 87077 CULTURE AEROBIC IDENTIFY: CPT

## 2020-10-28 PROCEDURE — 6370000000 HC RX 637 (ALT 250 FOR IP): Performed by: SURGERY

## 2020-10-28 PROCEDURE — C9803 HOPD COVID-19 SPEC COLLECT: HCPCS

## 2020-10-28 PROCEDURE — 81003 URINALYSIS AUTO W/O SCOPE: CPT

## 2020-10-28 PROCEDURE — 1200000000 HC SEMI PRIVATE

## 2020-10-28 PROCEDURE — 87070 CULTURE OTHR SPECIMN AEROBIC: CPT

## 2020-10-28 RX ORDER — LISINOPRIL 10 MG/1
10 TABLET ORAL DAILY
Status: DISCONTINUED | OUTPATIENT
Start: 2020-10-28 | End: 2020-10-29

## 2020-10-28 RX ORDER — HYDROXYZINE HYDROCHLORIDE 10 MG/1
50 TABLET, FILM COATED ORAL EVERY 6 HOURS PRN
Status: DISCONTINUED | OUTPATIENT
Start: 2020-10-28 | End: 2020-10-31 | Stop reason: HOSPADM

## 2020-10-28 RX ORDER — GABAPENTIN 300 MG/1
300 CAPSULE ORAL NIGHTLY
Status: DISCONTINUED | OUTPATIENT
Start: 2020-10-28 | End: 2020-10-31 | Stop reason: HOSPADM

## 2020-10-28 RX ORDER — PANTOPRAZOLE SODIUM 40 MG/1
40 TABLET, DELAYED RELEASE ORAL NIGHTLY
Status: DISCONTINUED | OUTPATIENT
Start: 2020-10-28 | End: 2020-10-31 | Stop reason: HOSPADM

## 2020-10-28 RX ORDER — FUROSEMIDE 20 MG/1
20 TABLET ORAL DAILY
Status: DISCONTINUED | OUTPATIENT
Start: 2020-10-28 | End: 2020-10-31 | Stop reason: HOSPADM

## 2020-10-28 RX ORDER — CLOPIDOGREL BISULFATE 75 MG/1
75 TABLET ORAL DAILY
Status: DISCONTINUED | OUTPATIENT
Start: 2020-10-28 | End: 2020-10-31 | Stop reason: HOSPADM

## 2020-10-28 RX ORDER — IBUPROFEN 200 MG
400 TABLET ORAL EVERY 6 HOURS PRN
Status: DISCONTINUED | OUTPATIENT
Start: 2020-10-28 | End: 2020-10-31 | Stop reason: HOSPADM

## 2020-10-28 RX ORDER — ATORVASTATIN CALCIUM 40 MG/1
40 TABLET, FILM COATED ORAL NIGHTLY
Status: DISCONTINUED | OUTPATIENT
Start: 2020-10-28 | End: 2020-10-31 | Stop reason: HOSPADM

## 2020-10-28 RX ORDER — TAMSULOSIN HYDROCHLORIDE 0.4 MG/1
0.4 CAPSULE ORAL 2 TIMES DAILY
Status: DISCONTINUED | OUTPATIENT
Start: 2020-10-28 | End: 2020-10-29

## 2020-10-28 RX ORDER — ALBUTEROL SULFATE 90 UG/1
2 AEROSOL, METERED RESPIRATORY (INHALATION) EVERY 6 HOURS PRN
Status: DISCONTINUED | OUTPATIENT
Start: 2020-10-28 | End: 2020-10-31 | Stop reason: HOSPADM

## 2020-10-28 RX ADMIN — PANTOPRAZOLE SODIUM 40 MG: 40 TABLET, DELAYED RELEASE ORAL at 23:29

## 2020-10-28 RX ADMIN — ATORVASTATIN CALCIUM 40 MG: 40 TABLET, FILM COATED ORAL at 19:56

## 2020-10-28 RX ADMIN — TAMSULOSIN HYDROCHLORIDE 0.4 MG: 0.4 CAPSULE ORAL at 19:56

## 2020-10-28 RX ADMIN — GABAPENTIN 300 MG: 300 CAPSULE ORAL at 19:55

## 2020-10-28 RX ADMIN — CEFTRIAXONE SODIUM 1 G: 1 INJECTION, POWDER, FOR SOLUTION INTRAMUSCULAR; INTRAVENOUS at 23:30

## 2020-10-28 ASSESSMENT — PAIN SCALES - GENERAL
PAINLEVEL_OUTOF10: 0
PAINLEVEL_OUTOF10: 0

## 2020-10-29 ENCOUNTER — APPOINTMENT (OUTPATIENT)
Dept: VASCULAR LAB | Age: 85
DRG: 603 | End: 2020-10-29
Attending: SURGERY
Payer: MEDICARE

## 2020-10-29 LAB
ALBUMIN SERPL-MCNC: 3.2 G/DL (ref 3.5–5.2)
ALBUMIN/GLOBULIN RATIO: ABNORMAL (ref 1–2.5)
ALP BLD-CCNC: 72 U/L (ref 40–129)
ALT SERPL-CCNC: 19 U/L (ref 5–41)
ANION GAP SERPL CALCULATED.3IONS-SCNC: 9 MMOL/L (ref 9–17)
AST SERPL-CCNC: 23 U/L
BILIRUB SERPL-MCNC: 0.31 MG/DL (ref 0.3–1.2)
BILIRUBIN DIRECT: <0.08 MG/DL
BILIRUBIN, INDIRECT: ABNORMAL MG/DL (ref 0–1)
BUN BLDV-MCNC: 40 MG/DL (ref 8–23)
BUN/CREAT BLD: 24 (ref 9–20)
CALCIUM SERPL-MCNC: 9.7 MG/DL (ref 8.6–10.4)
CHLORIDE BLD-SCNC: 107 MMOL/L (ref 98–107)
CO2: 29 MMOL/L (ref 20–31)
CREAT SERPL-MCNC: 1.7 MG/DL (ref 0.7–1.2)
EKG ATRIAL RATE: 64 BPM
EKG P AXIS: 58 DEGREES
EKG P-R INTERVAL: 226 MS
EKG Q-T INTERVAL: 438 MS
EKG QRS DURATION: 98 MS
EKG QTC CALCULATION (BAZETT): 451 MS
EKG R AXIS: -40 DEGREES
EKG T AXIS: 64 DEGREES
EKG VENTRICULAR RATE: 64 BPM
GFR AFRICAN AMERICAN: 46 ML/MIN
GFR NON-AFRICAN AMERICAN: 38 ML/MIN
GFR SERPL CREATININE-BSD FRML MDRD: ABNORMAL ML/MIN/{1.73_M2}
GFR SERPL CREATININE-BSD FRML MDRD: ABNORMAL ML/MIN/{1.73_M2}
GLOBULIN: ABNORMAL G/DL (ref 1.5–3.8)
GLUCOSE BLD-MCNC: 102 MG/DL (ref 70–99)
LV EF: 55 %
LVEF MODALITY: NORMAL
POTASSIUM SERPL-SCNC: 4.2 MMOL/L (ref 3.7–5.3)
SODIUM BLD-SCNC: 145 MMOL/L (ref 135–144)
TOTAL PROTEIN: 6 G/DL (ref 6.4–8.3)

## 2020-10-29 PROCEDURE — 99222 1ST HOSP IP/OBS MODERATE 55: CPT | Performed by: INTERNAL MEDICINE

## 2020-10-29 PROCEDURE — 93010 ELECTROCARDIOGRAM REPORT: CPT | Performed by: INTERNAL MEDICINE

## 2020-10-29 PROCEDURE — 93970 EXTREMITY STUDY: CPT

## 2020-10-29 PROCEDURE — 6370000000 HC RX 637 (ALT 250 FOR IP): Performed by: INTERNAL MEDICINE

## 2020-10-29 PROCEDURE — 2580000003 HC RX 258: Performed by: SURGERY

## 2020-10-29 PROCEDURE — 1200000000 HC SEMI PRIVATE

## 2020-10-29 PROCEDURE — 80048 BASIC METABOLIC PNL TOTAL CA: CPT

## 2020-10-29 PROCEDURE — 6370000000 HC RX 637 (ALT 250 FOR IP): Performed by: SURGERY

## 2020-10-29 PROCEDURE — 36415 COLL VENOUS BLD VENIPUNCTURE: CPT

## 2020-10-29 PROCEDURE — 80076 HEPATIC FUNCTION PANEL: CPT

## 2020-10-29 RX ORDER — SPIRONOLACTONE 25 MG/1
12.5 TABLET ORAL DAILY
Status: DISCONTINUED | OUTPATIENT
Start: 2020-10-29 | End: 2020-10-31 | Stop reason: HOSPADM

## 2020-10-29 RX ORDER — SODIUM CHLORIDE 0.9 % (FLUSH) 0.9 %
10 SYRINGE (ML) INJECTION PRN
Status: DISCONTINUED | OUTPATIENT
Start: 2020-10-29 | End: 2020-10-31 | Stop reason: HOSPADM

## 2020-10-29 RX ORDER — SODIUM CHLORIDE 0.9 % (FLUSH) 0.9 %
10 SYRINGE (ML) INJECTION 2 TIMES DAILY
Status: DISCONTINUED | OUTPATIENT
Start: 2020-10-29 | End: 2020-10-31 | Stop reason: HOSPADM

## 2020-10-29 RX ORDER — TAMSULOSIN HYDROCHLORIDE 0.4 MG/1
0.4 CAPSULE ORAL DAILY
Status: DISCONTINUED | OUTPATIENT
Start: 2020-10-30 | End: 2020-10-31 | Stop reason: HOSPADM

## 2020-10-29 RX ADMIN — Medication 10 ML: at 20:43

## 2020-10-29 RX ADMIN — Medication 10 ML: at 08:52

## 2020-10-29 RX ADMIN — CLOPIDOGREL BISULFATE 75 MG: 75 TABLET ORAL at 08:52

## 2020-10-29 RX ADMIN — GABAPENTIN 300 MG: 300 CAPSULE ORAL at 20:43

## 2020-10-29 RX ADMIN — IBUPROFEN 400 MG: 200 TABLET, FILM COATED ORAL at 17:00

## 2020-10-29 RX ADMIN — ATORVASTATIN CALCIUM 40 MG: 40 TABLET, FILM COATED ORAL at 20:43

## 2020-10-29 RX ADMIN — PANTOPRAZOLE SODIUM 40 MG: 40 TABLET, DELAYED RELEASE ORAL at 20:43

## 2020-10-29 RX ADMIN — SPIRONOLACTONE 12.5 MG: 25 TABLET, FILM COATED ORAL at 18:05

## 2020-10-29 RX ADMIN — Medication 99 MG: at 18:07

## 2020-10-29 RX ADMIN — TAMSULOSIN HYDROCHLORIDE 0.4 MG: 0.4 CAPSULE ORAL at 08:52

## 2020-10-29 RX ADMIN — HYDROXYZINE HYDROCHLORIDE 50 MG: 10 TABLET ORAL at 20:43

## 2020-10-29 ASSESSMENT — PAIN DESCRIPTION - LOCATION
LOCATION: FOOT
LOCATION: FOOT

## 2020-10-29 ASSESSMENT — PAIN DESCRIPTION - DESCRIPTORS
DESCRIPTORS: ACHING
DESCRIPTORS: ACHING

## 2020-10-29 ASSESSMENT — PAIN DESCRIPTION - PAIN TYPE
TYPE: ACUTE PAIN
TYPE: ACUTE PAIN

## 2020-10-29 ASSESSMENT — PAIN DESCRIPTION - ORIENTATION
ORIENTATION: LEFT
ORIENTATION: RIGHT

## 2020-10-29 ASSESSMENT — PAIN SCALES - GENERAL
PAINLEVEL_OUTOF10: 6
PAINLEVEL_OUTOF10: 0
PAINLEVEL_OUTOF10: 10
PAINLEVEL_OUTOF10: 6

## 2020-10-29 NOTE — PROGRESS NOTES
Pt has been in bed for this shift. He is pleasant and cooperative. BLLE are red,scaly and weeping slightly. Wound care provided as ordered and will be repeated every six hours. Takes HS meds without swallowing deficits. Sample taken to lab for UA.

## 2020-10-29 NOTE — PROGRESS NOTES
States that he is having 10/10 pain in his right ankle. ACE wrap removed and rewrapped. Indentation noted from ACE wrap noted at ankle. BLE reddened and dry. Dunham boots applied. States that pain is \"better. \" Denies further needs. Call light in reach.

## 2020-10-29 NOTE — H&P
History and Physical        Gisselle Martins is a 80 y.o. male  YOB: 1930        Chief complaint: Both legs progressive swelling and pain with breakdown of skin of both legs. Despite increasing his diuretics and reinforcing instructions on elevation of his legs and using ace wraps and to cease usage of veterinary and homeopathic creams and topical agent,  The skin over his legs below the tibial tuberosities circumstantially is macerated and broken down. The legs are cellulitic and the diameter of his right calf is 48 cm and much larger then the 38 cm of the left. His has pain in the ankles at rest.  He has a pmh of emergency stent placement for ischemic peripheral vascular disease below the trifurcation of the right lower leg two years ago. He has continued smoking. His pressure is running low and may represent volume reductions but also may represent a change in his cardiac output. He  Was hypertensive several years ago. His glucose is now slightly elevated. Family History  Diabetes No  Heart Disease Yes  Tuberculosis No  Cancer No  Other:         Family History: cardiovascular disease at an advanced age. Past History  Asthma No  Chronic Bronchitis Yes  Emphysema Yes  Tuberculosis No  Smokes Yes( pipe 70 years)  Head Injury No  Epilepsy No  Kidney Disease No  Liver Disease/Hepatitis No  Yellow Jaundice No  Diabetes No  Thyroid Disease No  Heart Disease Yes  High Blood Pressure Yes (previously)  Angina No  Rheumatic Fever No  Cortisone Rx No  Alcohol Excess No  Pregnancy No  Glaucoma No  Loose Teeth Yes and No  Tranquilizers No  Other: peripheral vascular disease.          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 Authorizing Provider   ibuprofen (ADVIL;MOTRIN) 200 MG tablet Take 400 mg by mouth every 6 hours as needed for Pain       Historical Provider, MD   magnesium 30 MG tablet Take 30 mg by mouth daily       Historical Provider, MD   POTASSIUM GLUCONATE PO Take by mouth       Historical Provider, MD   Albuterol Sulfate (PROAIR HFA IN) Inhale into the lungs 2 puffs every 6 hours as needed. Historical Provider, MD             hydrOXYzine (VISTARIL) 50 MG capsule Take 50 mg by mouth 3 times daily as needed for Itching       Historical Provider, MD   furosemide (LASIX) 20 MG tablet Take 20 mg by mouth 2 times daily        Historical Provider, MD   tamsulosin (FLOMAX) 0.4 MG capsule Take 0.4 mg by mouth 2 times daily       Historical Provider, MD   lisinopril (PRINIVIL;ZESTRIL) 5 MG tablet Take 5 mg by mouth daily       Historical Provider, MD      ,   Current Hospital Medications Plavix   No current facility-administered medications for this encounter. Allergies: Patient has no known allergies. Previous Surgery: percutaneous cath with angioplasty left leg 6/18, t and a, herniorrhaphy         Physical Examination     Constitutional: well nourished      Neurological: oriented times three cn 2-12 intact  Conductive hearing deficit      Eyes: perrla eom intact conj pink non icteric      Lymphatic wnl      Neck/Ear/Nose/Throat: no carotid bruits no masses slight cervical kyphosis     Respiratory: increased ap diameter chest slow expiration no rales no rhonchi      Cardiovascular: rrr      Abdomen/GI: soft nor masses no tenderness. Musculoskeletal: both lower leg swollen right leg much larger than left with extensive skin maceration and breakdown tissues weeping serum erythematous capillary filling less than two seconds.         G/U female: na                          G/U male: grossly wnl male age appropriate      Chest/Breasts: negative Skin/Integumentary: solar keratosis and elastosis      Psychological: droll sense of humor      Other:               Conclusion:   Cellulitis legs with skin breakdown and dependent oedema copd, peripheral vascular disease. Planned course of Action: admit to hospital  Ultrasound for dvt , antibiotics, possible cardiac evaluation                 The beneficiary may reasonably be expected to be discharged or transferred to a hospital within 96 hours after admission.       Estimated length of stay 96 plus hours           Time In:     Time Out:         Edmond Rodriguez MD

## 2020-10-29 NOTE — PROGRESS NOTES
SW met with pt to complete assessment during quality rounds. Pt is alert and oriented and jokes around during assessment. Pt is a 80year old  male admitted for cellulitis. Pt lives with is dtr and son in law and their cat \"Beau. \" Pt has a walker at home he can use if needed. Pt also has a hospital bed, shower chair, and grab bars. Pt has not been using any services prior to admission. Pt reports that his family transports him to appointments. Pt is a full code and follows with Dr Jose Cavazos as PCP. Pt has a living will on file in his medical record. Pt reports that his medications are covered well by insurance. Pt plans to return home with family at discharge. Pt has used [facility] services in the past but does not recall which agency. Pt identifies no discharge needs currently. SW will continue to follow and remain available as needed.  Daisy ANAND LSW 10/29/2020

## 2020-10-29 NOTE — PROGRESS NOTES
Up to bathroom. Voids and has BM. Bathes self independently. Gown and linens changed. Assisted back to bed. Dressings to BLE removed. Radiology in to complete BLE ultrasound at bedside.

## 2020-10-29 NOTE — PROGRESS NOTES
Quality flow rounds held on 10/29/20     Td Griggs is admitted for  cellulitis  Length of stay 1. Education:    Needed Education: wound care, meds, follow up,diet      Do you have any questions regarding your plan of care while at the hospital? denies    Planned Disposition:               [x]  Home when able                [] Swing Bed                [] ECF/SNF               [] Other/TBD    Barriers to Discharge:    Can you afford your medications? yes   Do you have transportation to follow up appointments? Daughter drives   Do you need any new equipment at home? Wound care supplies   Current equipment includes   walker if needed, hospital bed, bench seat in shower, grab bars    Do you have a living will or durable power of  for healthcare? yes               If yes do we have a copy on file? yes    Do you or your family have any questions or concerns we haven't already discussed? Denies    Lives with daughter and son in law  Niurka Collazo and writer present for rounding.

## 2020-10-30 LAB
ANION GAP SERPL CALCULATED.3IONS-SCNC: 9 MMOL/L (ref 9–17)
BNP INTERPRETATION: ABNORMAL
BUN BLDV-MCNC: 35 MG/DL (ref 8–23)
BUN/CREAT BLD: 24 (ref 9–20)
CALCIUM SERPL-MCNC: 9.6 MG/DL (ref 8.6–10.4)
CHLORIDE BLD-SCNC: 108 MMOL/L (ref 98–107)
CO2: 26 MMOL/L (ref 20–31)
CREAT SERPL-MCNC: 1.44 MG/DL (ref 0.7–1.2)
GFR AFRICAN AMERICAN: 56 ML/MIN
GFR NON-AFRICAN AMERICAN: 46 ML/MIN
GFR SERPL CREATININE-BSD FRML MDRD: ABNORMAL ML/MIN/{1.73_M2}
GFR SERPL CREATININE-BSD FRML MDRD: ABNORMAL ML/MIN/{1.73_M2}
GLUCOSE BLD-MCNC: 162 MG/DL (ref 70–99)
POTASSIUM SERPL-SCNC: 4.2 MMOL/L (ref 3.7–5.3)
PRO-BNP: 936 PG/ML
SODIUM BLD-SCNC: 143 MMOL/L (ref 135–144)

## 2020-10-30 PROCEDURE — 94640 AIRWAY INHALATION TREATMENT: CPT

## 2020-10-30 PROCEDURE — 2580000003 HC RX 258: Performed by: SURGERY

## 2020-10-30 PROCEDURE — 1200000000 HC SEMI PRIVATE

## 2020-10-30 PROCEDURE — 94664 DEMO&/EVAL PT USE INHALER: CPT

## 2020-10-30 PROCEDURE — 36415 COLL VENOUS BLD VENIPUNCTURE: CPT

## 2020-10-30 PROCEDURE — 80048 BASIC METABOLIC PNL TOTAL CA: CPT

## 2020-10-30 PROCEDURE — 6370000000 HC RX 637 (ALT 250 FOR IP): Performed by: SURGERY

## 2020-10-30 PROCEDURE — 6370000000 HC RX 637 (ALT 250 FOR IP): Performed by: INTERNAL MEDICINE

## 2020-10-30 PROCEDURE — 6360000002 HC RX W HCPCS: Performed by: SURGERY

## 2020-10-30 PROCEDURE — 83880 ASSAY OF NATRIURETIC PEPTIDE: CPT

## 2020-10-30 RX ADMIN — ALBUTEROL SULFATE 2 PUFF: 90 AEROSOL, METERED RESPIRATORY (INHALATION) at 21:26

## 2020-10-30 RX ADMIN — HYDROXYZINE HYDROCHLORIDE 50 MG: 10 TABLET ORAL at 08:55

## 2020-10-30 RX ADMIN — HYDROXYZINE HYDROCHLORIDE 50 MG: 10 TABLET ORAL at 22:48

## 2020-10-30 RX ADMIN — ATORVASTATIN CALCIUM 40 MG: 40 TABLET, FILM COATED ORAL at 21:21

## 2020-10-30 RX ADMIN — Medication 99 MG: at 08:55

## 2020-10-30 RX ADMIN — GABAPENTIN 300 MG: 300 CAPSULE ORAL at 21:21

## 2020-10-30 RX ADMIN — PANTOPRAZOLE SODIUM 40 MG: 40 TABLET, DELAYED RELEASE ORAL at 21:21

## 2020-10-30 RX ADMIN — Medication 10 ML: at 08:55

## 2020-10-30 RX ADMIN — IBUPROFEN 400 MG: 200 TABLET, FILM COATED ORAL at 21:21

## 2020-10-30 RX ADMIN — Medication 10 ML: at 21:21

## 2020-10-30 RX ADMIN — SPIRONOLACTONE 12.5 MG: 25 TABLET, FILM COATED ORAL at 08:56

## 2020-10-30 RX ADMIN — TAMSULOSIN HYDROCHLORIDE 0.4 MG: 0.4 CAPSULE ORAL at 08:55

## 2020-10-30 RX ADMIN — CEFTRIAXONE SODIUM 1 G: 1 INJECTION, POWDER, FOR SOLUTION INTRAMUSCULAR; INTRAVENOUS at 00:46

## 2020-10-30 RX ADMIN — CLOPIDOGREL BISULFATE 75 MG: 75 TABLET ORAL at 08:55

## 2020-10-30 RX ADMIN — ALBUTEROL SULFATE 2 PUFF: 90 AEROSOL, METERED RESPIRATORY (INHALATION) at 00:56

## 2020-10-30 RX ADMIN — IBUPROFEN 400 MG: 200 TABLET, FILM COATED ORAL at 09:01

## 2020-10-30 RX ADMIN — CEFTRIAXONE SODIUM 1 G: 1 INJECTION, POWDER, FOR SOLUTION INTRAMUSCULAR; INTRAVENOUS at 22:50

## 2020-10-30 RX ADMIN — FUROSEMIDE 20 MG: 20 TABLET ORAL at 08:55

## 2020-10-30 ASSESSMENT — PAIN SCALES - GENERAL
PAINLEVEL_OUTOF10: 3
PAINLEVEL_OUTOF10: 2
PAINLEVEL_OUTOF10: 3
PAINLEVEL_OUTOF10: 4
PAINLEVEL_OUTOF10: 6
PAINLEVEL_OUTOF10: 4

## 2020-10-30 ASSESSMENT — PAIN DESCRIPTION - LOCATION: LOCATION: FOOT

## 2020-10-30 ASSESSMENT — PAIN DESCRIPTION - ORIENTATION: ORIENTATION: LEFT

## 2020-10-30 ASSESSMENT — PAIN DESCRIPTION - PAIN TYPE: TYPE: ACUTE PAIN

## 2020-10-30 NOTE — PROGRESS NOTES
[facility]  Swing Bed Evaluation for Certification for [address] meets skilled criteria due to the need for skilled nursing supervision or skilled rehabilitation services listed below:   [] Therapy; physical and occupational; for decreased strength, balance and self         care activities. [] Tpn    [] Trach care   [] IV therapy   [x] Wound care    [] Other Skilled Need:        Husam Merida lives [] Alone      [] With Spouse    [x] Other: Daughter  and plans on returning there at discharge. [x] Pt declines list of alternate providers, pt prefers to receive skilled care at [facility]  [] List of alternate providers given to patient, pt prefers to receive skilled care at [facility]  [] Not applicable, pt admitted to [facility] from [facility] prefers this facility for skilled care and services can only be practically provided in a skilled nursing facility or swing bed hospital on an inpatient basis. Husam Merida will require skilled care on a daily basis beginning 10-, if medically stable.   These services are for an ongoing condition for which Husam Merida received inpatient care for at the [address] [place] [address],       Date: 10/30/2020

## 2020-10-30 NOTE — PROGRESS NOTES
Dr Kaetryna Corado speaks with patient and his son at bedside. BLE dressings removed. Will redress once he finishes lunch.

## 2020-10-30 NOTE — PROGRESS NOTES
loss, localized or generalized fluid accumulation    Lab Results   Component Value Date     10/30/2020    K 4.2 10/30/2020     (H) 10/30/2020    CO2 26 10/30/2020    BUN 35 (H) 10/30/2020    CREATININE 1.44 (H) 10/30/2020    GLUCOSE 162 (H) 10/30/2020    CALCIUM 9.6 10/30/2020    PROT 6.0 (L) 10/29/2020    LABALBU 3.2 (L) 10/29/2020    BILITOT 0.31 10/29/2020    ALKPHOS 72 10/29/2020    AST 23 10/29/2020    ALT 19 10/29/2020    LABGLOM 46 (L) 10/30/2020    GFRAA 56 (L) 10/30/2020    GLOB NOT REPORTED 10/29/2020     No results found for: LABA1C  No results found for: EAG    Nutrition Interventions:   Food and/or Nutrient Delivery:  Continue Current Diet  Nutrition Education/Counseling:  No recommendation at this time   Coordination of Nutrition Care:  Continue to monitor while inpatient    Goals:  PO >75% meals       Nutrition Monitoring and Evaluation:   Behavioral-Environmental Outcomes:  None Identified   Food/Nutrient Intake Outcomes:  Food and Nutrient Intake  Physical Signs/Symptoms Outcomes:  Biochemical Data, Weight, Fluid Status or Edema     Discharge Planning:     Too soon to determine     Electronically signed by Angy Jiménez RD, PATRICIA on 10/30/20 at 12:18 PM EDT    Contact: [phone]

## 2020-10-30 NOTE — PROGRESS NOTES
SWING BED PROGRAM PRE-ADMISSION ASSESSMENT  (TRADITIONAL MEDICARE PATIENTS)  Patient Name: Juliette Rodriguez      : 1930  (80 y.o.) Gender: male   Room: [address] [place] [address] MRN: [id number]      Inpatient Admission Date: 10- Date & Time of Referral: 10/30/2020     Referred By: Wilbur Phoenix, MD Referred from:  [x] W    [] Other:     # of Skilled Care Days Used in Last 60 days: 0 Insurance: [x]  Medicare                      [x]  Secondary - Type:     Present Condition/Diagnosis:    Cellulitis [L03.90]  Cellulitis [L03.90]      Previous Medical History:   Past Medical History:   Diagnosis Date    Arthritis     COPD (chronic obstructive pulmonary disease) ([facility] [address]     Dependent edema     Hyperlipidemia     Hypertension     Psoriasis         ADL Performance Last Seven (7) Days (Please Score)   Patients performance over all shifts during last seven (7) days     0 = Independent - No help or oversight  1 = Supervision - Oversight, encouragement or cueing provided  2 = Limited Assist -Highly involved in activity; assistance in guided maneuvering of limbs or other non-weight-bearing assistance  3 = Extensive Assist - Receives physical help in guided maneuvering of limbs or other non-weight bearing assistance  4 = Total Dependence - Full staff performance of activity   7 = Activity occurred only once or twice  8 = Activity did not occur - Activity did not occur or family and/or non- facility staff provided care 100% of the time for that activity over a 7-day period.      SCORE   BED MOBILITY - How client moves to and from lying position, turns side to side, and positions body while in bed 2   TRANSFER - How resident moves between surfaces - to/from: bed, chair, wheelchair, standing position (EXCLUDE to/from bath/toilet) 2   TOILET USE - How client uses the toilet room (or commode, bedpan, urinal);transfers on/off toilet, cleanses, changes pad, manages ostomy or catheter, adjusts clothes 2   EATING - How client eats and drinks (regardless of skill). Includes intake of nourishment by other means (e.g., tube feeding, total parenteral nutrition) 0   Estimated Pre-Admission ADL Value: 6     PATIENT WILL RECEIVE THE FOLLOWING SKILLED SERVICES AS A SWING BED PATIENT:       REHABILITATION (PT/OT/SP)    [] ULTRA HIGH 720 or more minutes minimum per week of at least two (2) disciplines - 1st for at least five (5) days and 2nd for at least three (3) days   [] VERY HIGH 500 or more minutes minimum per week of at least one (1) discipline for at least five (5) days   [] HIGH 325 or more minutes minimum per week of at least one (1) discipline for at least five (5) days   [] MEDIUM 150 or more minutes minimum per week at least five (5) days of any combination with three (3) therapies   [] LOW Restorative nursing at least six (6) days, two (2) activities, or therapies for at least three (3) days at least forty-five (45) minute per week minimum services. EXTENSIVE SERVICES   [] Tracheostomy Care   [] Ventilator/Respirator   [] Infection Isolation   SPECIAL CARE HIGH   [] MS with ADL greater than or equal to 10  [] Quadriplegic with ADL greater than or equal to 5  [] emphysema/COPD and shortness of breath when lying flat  [] Fever w/at least one (1) of the following: [] dehydration [] pneumonia [] vomiting [] weight loss  [] feeding tube  [] Septicemia  [] Coma (not awake & completely dependent in ADL)  [] Diabetes and injections seven (7) days and Dr. order change two (2) or more days.   [] Parenteral/IV feeding  [] respiratory therapy for seven (7) days   SPECIAL CARE LOW:   [] Respiratory Therapy  [x] Ulcers (2+sites all stages) w/treatment  [] Multiple Sclerosis  [] Cerebral Palsy  [] Parkinsons Disease  [] Oxygen Therapy  [] Extensive care services w/ADL less than 6  [] Fever w/at least one (1) of the following: [] dehydration [] pneumonia [] vomiting [] weight loss  [] Foot Infection or open lesions on the foot with treatment  []

## 2020-10-30 NOTE — PROGRESS NOTES
[facility] BED ORIENTATION    Patient Name: Shamar Noel  : 1930   Gender: male   Diagnosis:  Cellulitis [L03.90]  Cellulitis [L03.90] MRN: [id number]     [x] Goal is to provide you with very good care    [x] Insurance and Financial Responsibilities  Went over Pt's co-pay and benefits. No questions at this time. [x] Changes to Expect           - Safely encourage you to learn to care for yourself     - Frequency of Doctor's Visits       - Family Training Likely  [x] Visiting Hours:           11:00am - 8:30 pm (or by special arrangement)  [x] Personal Phone Number          Located on Dry-Erase Board   [x] Swing Bed Team Meetings         Family encouraged to attend   [x]  Clothing            Bring what you normally wear  [x]  Prevention of Falls           Put call light on, and wait until OK'd to get up on your own. [x] Therapy Expectations          Do your best to participate  [x]  Advanced Directives                          [x]  Pt has advanced directives and we have a copy on file                            []  Pt has advanced directives and we do not have a copy on file,  notified and will follow up. []   Pt does not have advanced directives.   [x] Educated on [employer]           Address provided for direct to hospital service                                                                                                                                                                                                                                                                                       Orientation Completed and agreed upon with Shamar Noel and/or Family Members

## 2020-10-30 NOTE — PROGRESS NOTES
Subjective: Patients son is by the bedside. Patient denies chest pain shortness of breath. He reports that his legs feel a little better. Less pain. Objective: chest slow expiration no rales occasional wheeze at terminal expiration. Card rrr no murmurs. carotids no bruits. Oedema further reduced in legs.  bmp creatinine 1.4 electrolytes in balance. Legs diameters down afebrile vs stable tolerating diet well. Cultures pending but suggestive of polymicrobial infection. Cardiac echo pending. Assessment: pulmonary hypertension, emphysema, fixed cardiac output, mixed bacterial infection both legs with breakdown of skin. Plan: I review concepts with son and patient on how this all works cardio dynamically and the strategies of management and care. It will take many discussions for them to attain a working understanding. Continue present care of legs and gentle diuresis as dictated by  Limits of physiology. Await forth day spontaneous diuresis phenomena.       Azra Mireles MD

## 2020-10-30 NOTE — PROGRESS NOTES
Dressing change to BLE completed. Cleansed with peroxide and dried. Single layer of curlex applied. ACE wraps applied. Declines to have moon boots applied at this time. Denies further needs at this time. Call light in reach.

## 2020-10-30 NOTE — PLAN OF CARE
Problem: Skin Integrity:  Goal: Will show no infection signs and symptoms  Description: Will show no infection signs and symptoms  10/30/2020 0846 by Zaria Mcgregor RN  Outcome: Ongoing     Problem: Skin Integrity:  Goal: Absence of new skin breakdown  Description: Absence of new skin breakdown  Outcome: Ongoing     Problem: Falls - Risk of:  Goal: Will remain free from falls  Description: Will remain free from falls  Outcome: Ongoing     Problem: DAILY CARE  Goal: Daily care needs are met  Outcome: Ongoing     Problem: PAIN  Goal: Patient's pain/discomfort is manageable  Outcome: Ongoing     Problem: SKIN INTEGRITY  Goal: Skin integrity is maintained or improved  Outcome: Ongoing

## 2020-10-30 NOTE — PLAN OF CARE
Problem: Falls - Risk of:  Goal: Will remain free from falls  Description: Will remain free from falls  10/30/2020 1643 by Regina Meraz RN  Outcome: Met This Shift  10/30/2020 0846 by Brody Bajwa RN  Outcome: Ongoing     Problem: Pain:  Description: Pain management should include both nonpharmacologic and pharmacologic interventions. Goal: Pain level will decrease  Description: Pain level will decrease  Outcome: Met This Shift     Problem: DAILY CARE  Goal: Daily care needs are met  10/30/2020 1643 by Regina Meraz RN  Outcome: Met This Shift  10/30/2020 0846 by Brody Bajwa RN  Outcome: Ongoing     Problem: PAIN  Goal: Patient's pain/discomfort is manageable  10/30/2020 1643 by Regina Meraz RN  Outcome: Met This Shift  10/30/2020 0846 by Brody Bajwa RN  Outcome: Ongoing     Problem: Skin Integrity:  Goal: Will show no infection signs and symptoms  Description: Will show no infection signs and symptoms  10/30/2020 0846 by Brody Bajwa RN  Outcome: Ongoing  Goal: Absence of new skin breakdown  Description: Absence of new skin breakdown  10/30/2020 0846 by Brody Bajwa RN  Outcome: Ongoing     Problem: SKIN INTEGRITY  Goal: Skin integrity is maintained or improved  10/30/2020 0846 by Brody Bajwa RN  Outcome: Ongoing     Problem: KNOWLEDGE DEFICIT  Goal: Patient/S.O. demonstrates understanding of disease process, treatment plan, medications, and discharge instructions.   Outcome: Ongoing     Problem: Nutrition  Goal: Optimal nutrition therapy  10/30/2020 1225 by Chandler Larson RD, LD  Outcome: Ongoing  Note: Nutrition Problem #1: Moderate malnutrition  Intervention: Food and/or Nutrient Delivery: Continue Current Diet  Nutritional Goals: PO >75% meals  Discourage any salt use  Encourage protein foods

## 2020-10-31 ENCOUNTER — HOSPITAL ENCOUNTER (INPATIENT)
Age: 85
LOS: 4 days | Discharge: HOME | DRG: 603 | End: 2020-11-04
Attending: SURGERY | Admitting: SURGERY
Payer: MEDICARE

## 2020-10-31 VITALS
BODY MASS INDEX: 36.37 KG/M2 | DIASTOLIC BLOOD PRESSURE: 50 MMHG | SYSTOLIC BLOOD PRESSURE: 96 MMHG | TEMPERATURE: 98.7 F | RESPIRATION RATE: 18 BRPM | HEIGHT: 64 IN | WEIGHT: 213 LBS | OXYGEN SATURATION: 98 % | HEART RATE: 64 BPM

## 2020-10-31 LAB
ANION GAP SERPL CALCULATED.3IONS-SCNC: 7 MMOL/L (ref 9–17)
BNP INTERPRETATION: ABNORMAL
BUN BLDV-MCNC: 29 MG/DL (ref 8–23)
BUN/CREAT BLD: 19 (ref 9–20)
CALCIUM SERPL-MCNC: 9.5 MG/DL (ref 8.6–10.4)
CHLORIDE BLD-SCNC: 110 MMOL/L (ref 98–107)
CO2: 28 MMOL/L (ref 20–31)
CREAT SERPL-MCNC: 1.49 MG/DL (ref 0.7–1.2)
CULTURE: ABNORMAL
DIRECT EXAM: ABNORMAL
GFR AFRICAN AMERICAN: 54 ML/MIN
GFR NON-AFRICAN AMERICAN: 44 ML/MIN
GFR SERPL CREATININE-BSD FRML MDRD: ABNORMAL ML/MIN/{1.73_M2}
GFR SERPL CREATININE-BSD FRML MDRD: ABNORMAL ML/MIN/{1.73_M2}
GLUCOSE BLD-MCNC: 86 MG/DL (ref 70–99)
Lab: ABNORMAL
Lab: ABNORMAL
POTASSIUM SERPL-SCNC: 4.4 MMOL/L (ref 3.7–5.3)
PRO-BNP: 1480 PG/ML
SODIUM BLD-SCNC: 145 MMOL/L (ref 135–144)
SPECIMEN DESCRIPTION: ABNORMAL
SPECIMEN DESCRIPTION: ABNORMAL

## 2020-10-31 PROCEDURE — 99233 SBSQ HOSP IP/OBS HIGH 50: CPT | Performed by: INTERNAL MEDICINE

## 2020-10-31 PROCEDURE — 1200000002 HC SEMI PRIVATE SWING BED

## 2020-10-31 PROCEDURE — 36415 COLL VENOUS BLD VENIPUNCTURE: CPT

## 2020-10-31 PROCEDURE — 6370000000 HC RX 637 (ALT 250 FOR IP): Performed by: SURGERY

## 2020-10-31 PROCEDURE — 80048 BASIC METABOLIC PNL TOTAL CA: CPT

## 2020-10-31 PROCEDURE — 2580000003 HC RX 258: Performed by: SURGERY

## 2020-10-31 PROCEDURE — 6370000000 HC RX 637 (ALT 250 FOR IP): Performed by: INTERNAL MEDICINE

## 2020-10-31 PROCEDURE — 94640 AIRWAY INHALATION TREATMENT: CPT

## 2020-10-31 PROCEDURE — 83880 ASSAY OF NATRIURETIC PEPTIDE: CPT

## 2020-10-31 RX ORDER — ALBUTEROL SULFATE 90 UG/1
2 AEROSOL, METERED RESPIRATORY (INHALATION) EVERY 6 HOURS PRN
Status: DISCONTINUED | OUTPATIENT
Start: 2020-10-31 | End: 2020-11-04 | Stop reason: HOSPADM

## 2020-10-31 RX ORDER — CLOPIDOGREL BISULFATE 75 MG/1
75 TABLET ORAL DAILY
Status: DISCONTINUED | OUTPATIENT
Start: 2020-11-01 | End: 2020-11-04 | Stop reason: HOSPADM

## 2020-10-31 RX ORDER — SPIRONOLACTONE 25 MG/1
12.5 TABLET ORAL DAILY
Status: DISCONTINUED | OUTPATIENT
Start: 2020-11-01 | End: 2020-11-04 | Stop reason: HOSPADM

## 2020-10-31 RX ORDER — IBUPROFEN 200 MG
400 TABLET ORAL EVERY 6 HOURS PRN
Status: DISCONTINUED | OUTPATIENT
Start: 2020-10-31 | End: 2020-11-04 | Stop reason: HOSPADM

## 2020-10-31 RX ORDER — HYDROXYZINE HYDROCHLORIDE 10 MG/1
50 TABLET, FILM COATED ORAL EVERY 6 HOURS PRN
Status: CANCELLED | OUTPATIENT
Start: 2020-10-31

## 2020-10-31 RX ORDER — FUROSEMIDE 20 MG/1
20 TABLET ORAL DAILY
Status: CANCELLED | OUTPATIENT
Start: 2020-11-01

## 2020-10-31 RX ORDER — SODIUM CHLORIDE 0.9 % (FLUSH) 0.9 %
10 SYRINGE (ML) INJECTION 2 TIMES DAILY
Status: DISCONTINUED | OUTPATIENT
Start: 2020-10-31 | End: 2020-11-04 | Stop reason: HOSPADM

## 2020-10-31 RX ORDER — LANOLIN/MINERAL OIL
LOTION (ML) TOPICAL 2 TIMES DAILY
Status: DISCONTINUED | OUTPATIENT
Start: 2020-10-31 | End: 2020-11-03

## 2020-10-31 RX ORDER — IBUPROFEN 200 MG
400 TABLET ORAL EVERY 6 HOURS PRN
Status: CANCELLED | OUTPATIENT
Start: 2020-10-31

## 2020-10-31 RX ORDER — TAMSULOSIN HYDROCHLORIDE 0.4 MG/1
0.4 CAPSULE ORAL DAILY
Status: CANCELLED | OUTPATIENT
Start: 2020-11-01

## 2020-10-31 RX ORDER — PANTOPRAZOLE SODIUM 40 MG/1
40 TABLET, DELAYED RELEASE ORAL NIGHTLY
Status: DISCONTINUED | OUTPATIENT
Start: 2020-10-31 | End: 2020-11-04 | Stop reason: HOSPADM

## 2020-10-31 RX ORDER — LANOLIN/MINERAL OIL
LOTION (ML) TOPICAL 2 TIMES DAILY
Status: DISCONTINUED | OUTPATIENT
Start: 2020-10-31 | End: 2020-10-31

## 2020-10-31 RX ORDER — ALBUTEROL SULFATE 90 UG/1
2 AEROSOL, METERED RESPIRATORY (INHALATION) EVERY 6 HOURS PRN
Status: CANCELLED | OUTPATIENT
Start: 2020-10-31

## 2020-10-31 RX ORDER — ATORVASTATIN CALCIUM 40 MG/1
40 TABLET, FILM COATED ORAL NIGHTLY
Status: DISCONTINUED | OUTPATIENT
Start: 2020-10-31 | End: 2020-11-04 | Stop reason: HOSPADM

## 2020-10-31 RX ORDER — HYDROXYZINE HYDROCHLORIDE 10 MG/1
50 TABLET, FILM COATED ORAL EVERY 6 HOURS PRN
Status: DISCONTINUED | OUTPATIENT
Start: 2020-10-31 | End: 2020-11-04 | Stop reason: HOSPADM

## 2020-10-31 RX ORDER — LANOLIN/MINERAL OIL
LOTION (ML) TOPICAL 2 TIMES DAILY
Status: CANCELLED | OUTPATIENT
Start: 2020-10-31

## 2020-10-31 RX ORDER — SODIUM CHLORIDE 0.9 % (FLUSH) 0.9 %
10 SYRINGE (ML) INJECTION PRN
Status: DISCONTINUED | OUTPATIENT
Start: 2020-10-31 | End: 2020-11-04 | Stop reason: HOSPADM

## 2020-10-31 RX ORDER — PANTOPRAZOLE SODIUM 40 MG/1
40 TABLET, DELAYED RELEASE ORAL NIGHTLY
Status: CANCELLED | OUTPATIENT
Start: 2020-10-31

## 2020-10-31 RX ORDER — SODIUM CHLORIDE 0.9 % (FLUSH) 0.9 %
10 SYRINGE (ML) INJECTION 2 TIMES DAILY
Status: CANCELLED | OUTPATIENT
Start: 2020-10-31

## 2020-10-31 RX ORDER — LANOLIN/MINERAL OIL
LOTION (ML) TOPICAL 2 TIMES DAILY
Status: DISCONTINUED | OUTPATIENT
Start: 2020-10-31 | End: 2020-10-31 | Stop reason: HOSPADM

## 2020-10-31 RX ORDER — SODIUM CHLORIDE 0.9 % (FLUSH) 0.9 %
10 SYRINGE (ML) INJECTION PRN
Status: CANCELLED | OUTPATIENT
Start: 2020-10-31

## 2020-10-31 RX ORDER — GABAPENTIN 300 MG/1
300 CAPSULE ORAL NIGHTLY
Status: DISCONTINUED | OUTPATIENT
Start: 2020-10-31 | End: 2020-11-04 | Stop reason: HOSPADM

## 2020-10-31 RX ORDER — CLOPIDOGREL BISULFATE 75 MG/1
75 TABLET ORAL DAILY
Status: CANCELLED | OUTPATIENT
Start: 2020-11-01

## 2020-10-31 RX ORDER — GABAPENTIN 300 MG/1
300 CAPSULE ORAL NIGHTLY
Status: CANCELLED | OUTPATIENT
Start: 2020-10-31

## 2020-10-31 RX ORDER — ATORVASTATIN CALCIUM 40 MG/1
40 TABLET, FILM COATED ORAL NIGHTLY
Status: CANCELLED | OUTPATIENT
Start: 2020-10-31

## 2020-10-31 RX ORDER — LANOLIN ALCOHOL/MO/W.PET/CERES
400 CREAM (GRAM) TOPICAL DAILY
Status: DISCONTINUED | OUTPATIENT
Start: 2020-11-01 | End: 2020-11-04 | Stop reason: HOSPADM

## 2020-10-31 RX ORDER — FUROSEMIDE 20 MG/1
20 TABLET ORAL DAILY
Status: DISCONTINUED | OUTPATIENT
Start: 2020-11-01 | End: 2020-11-04 | Stop reason: HOSPADM

## 2020-10-31 RX ORDER — SPIRONOLACTONE 25 MG/1
12.5 TABLET ORAL DAILY
Status: CANCELLED | OUTPATIENT
Start: 2020-11-01

## 2020-10-31 RX ORDER — TAMSULOSIN HYDROCHLORIDE 0.4 MG/1
0.4 CAPSULE ORAL DAILY
Status: DISCONTINUED | OUTPATIENT
Start: 2020-11-01 | End: 2020-11-04 | Stop reason: HOSPADM

## 2020-10-31 RX ADMIN — PANTOPRAZOLE SODIUM 40 MG: 40 TABLET, DELAYED RELEASE ORAL at 21:32

## 2020-10-31 RX ADMIN — GABAPENTIN 300 MG: 300 CAPSULE ORAL at 21:32

## 2020-10-31 RX ADMIN — TAMSULOSIN HYDROCHLORIDE 0.4 MG: 0.4 CAPSULE ORAL at 09:05

## 2020-10-31 RX ADMIN — Medication 400 MG: at 20:27

## 2020-10-31 RX ADMIN — HYDROXYZINE HYDROCHLORIDE 50 MG: 10 TABLET ORAL at 21:34

## 2020-10-31 RX ADMIN — Medication 10 ML: at 21:35

## 2020-10-31 RX ADMIN — FUROSEMIDE 20 MG: 20 TABLET ORAL at 09:05

## 2020-10-31 RX ADMIN — ALBUTEROL SULFATE 2 PUFF: 90 AEROSOL, METERED RESPIRATORY (INHALATION) at 16:29

## 2020-10-31 RX ADMIN — Medication 99 MG: at 09:06

## 2020-10-31 RX ADMIN — CLOPIDOGREL BISULFATE 75 MG: 75 TABLET ORAL at 09:05

## 2020-10-31 RX ADMIN — IBUPROFEN 400 MG: 200 TABLET, FILM COATED ORAL at 20:24

## 2020-10-31 RX ADMIN — SPIRONOLACTONE 12.5 MG: 25 TABLET, FILM COATED ORAL at 09:05

## 2020-10-31 RX ADMIN — ATORVASTATIN CALCIUM 40 MG: 40 TABLET, FILM COATED ORAL at 21:32

## 2020-10-31 RX ADMIN — Medication 10 ML: at 09:05

## 2020-10-31 RX ADMIN — Medication: at 17:55

## 2020-10-31 ASSESSMENT — PAIN SCALES - GENERAL
PAINLEVEL_OUTOF10: 0
PAINLEVEL_OUTOF10: 0
PAINLEVEL_OUTOF10: 5
PAINLEVEL_OUTOF10: 0
PAINLEVEL_OUTOF10: 4
PAINLEVEL_OUTOF10: 0
PAINLEVEL_OUTOF10: 2
PAINLEVEL_OUTOF10: 0
PAINLEVEL_OUTOF10: 0
PAINLEVEL_OUTOF10: 6
PAINLEVEL_OUTOF10: 0

## 2020-10-31 NOTE — PLAN OF CARE
Problem: Skin Integrity:  Goal: Absence of new skin breakdown  Description: Absence of new skin breakdown  10/31/2020 1224 by Frankie Howell RN  Outcome: Met This Shift  10/31/2020 0256 by Zoey Mathur RN  Outcome: Ongoing     Problem: Falls - Risk of:  Goal: Will remain free from falls  Description: Will remain free from falls  10/31/2020 1224 by Frankie Howell RN  Outcome: Met This Shift  10/31/2020 0256 by Zoey Mathur RN  Outcome: Met This Shift  Goal: Absence of physical injury  Description: Absence of physical injury  10/31/2020 1224 by Frankie Howell RN  Outcome: Met This Shift  10/31/2020 0256 by Zoey Mathur RN  Outcome: Met This Shift     Problem: DAILY CARE  Goal: Daily care needs are met  10/31/2020 0256 by Zoey Mathur RN  Outcome: Met This Shift     Problem: SKIN INTEGRITY  Goal: Skin integrity is maintained or improved  10/31/2020 1224 by Frankie Howell RN  Outcome: Met This Shift  10/31/2020 0256 by Zoey Mathur RN  Outcome: Ongoing

## 2020-10-31 NOTE — PLAN OF CARE
Problem: Skin Integrity:  Goal: Will show no infection signs and symptoms  Description: Will show no infection signs and symptoms  Outcome: Met This Shift     Problem: Fluid Volume:  Goal: Hemodynamic stability will improve  Description: Hemodynamic stability will improve  Outcome: Met This Shift     Problem: Falls - Risk of:  Goal: Will remain free from falls  Description: Will remain free from falls  Outcome: Ongoing

## 2020-10-31 NOTE — PROGRESS NOTES
Spoke with patients daughter Aryan Bolaños via phone. States that she will bring in his corn huskers from home so it can be used this weekend.

## 2020-10-31 NOTE — H&P
Signed         Show:Clear all  [x]Manual[]Template[x]Copied    Added by:  [x]Je Fisher MD    []Homero for details  History and Physical        Rama Villanueva is a 80 y.o. male  YOB: 1930        Chief complaint: Both legs progressive swelling and pain with breakdown of skin of both legs. Despite increasing his diuretics and reinforcing instructions on elevation of his legs and using ace wraps and to cease usage of veterinary and homeopathic creams and topical agent,  The skin over his legs below the tibial tuberosities circumstantially is macerated and broken down. The legs are cellulitic and the diameter of his right calf is 48 cm and much larger then the 38 cm of the left. His has pain in the ankles at rest.  He has a pmh of emergency stent placement for ischemic peripheral vascular disease below the trifurcation of the right lower leg two years ago. He has continued smoking. His pressure is running low and may represent volume reductions but also may represent a change in his cardiac output. He  Was hypertensive several years ago. His glucose is now slightly elevated. Cardiologist has seen him and studied his heart. He has pulmonary hypertension and his ideal range of hydration is being worked out with a close eye on bmp and creatinine. Family History  Diabetes No  Heart Disease Yes  Tuberculosis No  Cancer No  Other:         Family History: cardiovascular disease at an advanced age. Past History  Asthma No  Chronic Bronchitis Yes  Emphysema Yes  Tuberculosis No  Smokes Yes( pipe 70 years)  Head Injury No  Epilepsy No  Kidney Disease No  Liver Disease/Hepatitis No  Yellow Jaundice No  Diabetes No  Thyroid Disease No  Heart Disease Yes  High Blood Pressure Yes (previously)  Angina No  Rheumatic Fever No  Cortisone Rx No  Alcohol Excess No  Pregnancy No  Glaucoma No  Loose Teeth Yes and No  Tranquilizers No  Other: peripheral vascular disease.          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 Authorizing Provider   ibuprofen (ADVIL;MOTRIN) 200 MG tablet Take 400 mg by mouth every 6 hours as needed for Pain       Historical Provider, MD   magnesium 30 MG tablet Take 30 mg by mouth daily       Historical Provider, MD   POTASSIUM GLUCONATE PO Take by mouth       Historical Provider, MD   Albuterol Sulfate (PROAIR HFA IN) Inhale into the lungs 2 puffs every 6 hours as needed. Historical Provider, MD                    hydrOXYzine (VISTARIL) 50 MG capsule Take 50 mg by mouth 3 times daily as needed for Itching       Historical Provider, MD   furosemide (LASIX) 20 MG tablet Take 20 mg by mouth 2 times daily        Historical Provider, MD   tamsulosin (FLOMAX) 0.4 MG capsule Take 0.4 mg by mouth 2 times daily       Historical Provider, MD   lisinopril (PRINIVIL;ZESTRIL) 5 MG tablet Take 5 mg by mouth daily       Historical Provider, MD      ,   Current Hospital Medications Plavix   No current facility-administered medications for this encounter. Allergies: Patient has no known allergies. Previous Surgery: percutaneous cath with angioplasty left leg 6/18, t and a, herniorrhaphy         Physical Examination     Constitutional: well nourished      Neurological: oriented times three cn 2-12 intact  Conductive hearing deficit      Eyes: perrla eom intact conj pink non icteric      Lymphatic wnl      Neck/Ear/Nose/Throat: no carotid bruits no masses slight cervical kyphosis     Respiratory: increased ap diameter chest slow expiration no rales no rhonchi      Cardiovascular: rrr      Abdomen/GI: soft nor masses no tenderness. Musculoskeletal: both lower leg swollen right leg much larger than left with extensive skin maceration and breakdown tissues weeping serum erythematous capillary filling less than two seconds. Now less heat in lower legs and calf circumference 34 cm down from 48 cm and left 37 cm down from 38  cm   . G/U female: na                          G/U male: grossly wnl male age appropriate      Chest/Breasts: negative      Skin/Integumentary: solar keratosis and elastosis      Psychological: droll sense of humor      Other:               Conclusion:  Pulmonary hypertension, fixed cardiac output, chronic renal disease, emphysema, Cellulitis legs with skin breakdown and dependent oedema copd, peripheral vascular disease. Planned course of Action: admit to swing bed status and contue wound care. The beneficiary may reasonably be expected to be discharged or transferred to a hospital within 96 hours after admission.       Estimated length of stay 96 plus hours            Time In:     Time Out:         Betsy Ware MD

## 2020-10-31 NOTE — PROGRESS NOTES
Dr. Sally Riojas called to nursing station for update on pt. Updated on pt status. No new orders received. ACE wraps removed, wound care provided. Kerlix and ACE wraps reapplied. PRN atarax provided to pt for itching. Water refilled, urinal emptied. Pt denies further needs at this time.

## 2020-10-31 NOTE — PLAN OF CARE
Problem: Falls - Risk of:  Goal: Will remain free from falls  Description: Will remain free from falls  10/31/2020 0256 by Sanam Melchor RN  Outcome: Met This Shift  10/30/2020 1643 by Sumeet Nolen RN  Outcome: Met This Shift  Goal: Absence of physical injury  Description: Absence of physical injury  Outcome: Met This Shift     Problem: DAILY CARE  Goal: Daily care needs are met  10/31/2020 0256 by Sanam Melchor RN  Outcome: Met This Shift  10/30/2020 1643 by Sumeet Nolen RN  Outcome: Met This Shift     Problem: Skin Integrity:  Goal: Will show no infection signs and symptoms  Description: Will show no infection signs and symptoms  Outcome: Ongoing  Goal: Absence of new skin breakdown  Description: Absence of new skin breakdown  Outcome: Ongoing     Problem: Pain:  Goal: Pain level will decrease  Description: Pain level will decrease  10/31/2020 0256 by Sanam Melchor RN  Outcome: Ongoing  10/30/2020 1643 by Sumeet Nolen RN  Outcome: Met This Shift  Goal: Control of acute pain  Description: Control of acute pain  Outcome: Ongoing  Goal: Control of chronic pain  Description: Control of chronic pain  Outcome: Ongoing     Problem: SAFETY  Goal: Free from accidental physical injury  Outcome: Ongoing  Goal: Free from intentional harm  Outcome: Ongoing     Problem: PAIN  Goal: Patient's pain/discomfort is manageable  10/31/2020 0256 by Sanam Melchor RN  Outcome: Ongoing  10/30/2020 1643 by Sumeet Nolen RN  Outcome: Met This Shift     Problem: SKIN INTEGRITY  Goal: Skin integrity is maintained or improved  Outcome: Ongoing     Problem: KNOWLEDGE DEFICIT  Goal: Patient/S.O. demonstrates understanding of disease process, treatment plan, medications, and discharge instructions.   10/31/2020 0256 by Sanam Melchor RN  Outcome: Ongoing  10/30/2020 1643 by Sumeet Nolen RN  Outcome: Ongoing     Problem: DISCHARGE BARRIERS  Goal: Patient's continuum of care needs are met  Outcome: Ongoing     Problem: Nutrition  Goal: Optimal nutrition therapy  Outcome: Ongoing

## 2020-10-31 NOTE — PROGRESS NOTES
Subjective: Patient reports he feels better today. Has enjoyed his lunch thoroughly. Objective: chest slow expiration no rales occ mucoid rhonchus. Card rrr no murmurs. Carotids no bruits. Legs unwrapped. Wounds inspected. Less heat in tissues. Few areas that are trying to reestablish skin stick to dressings and bleed. We measure calf diameters which right calf 34 cm diameter and   37 cm left calf. Bmp today shows improvement     Assessment: pulmonary hypertension, cellulitis both legs, dependent oedema legs. Plan: continue currently successful plan of care. All of the patients questions answered.       Alysha Richardson MD

## 2020-11-01 PROCEDURE — 2580000003 HC RX 258: Performed by: SURGERY

## 2020-11-01 PROCEDURE — 97162 PT EVAL MOD COMPLEX 30 MIN: CPT

## 2020-11-01 PROCEDURE — 6370000000 HC RX 637 (ALT 250 FOR IP): Performed by: SURGERY

## 2020-11-01 PROCEDURE — 1200000002 HC SEMI PRIVATE SWING BED

## 2020-11-01 PROCEDURE — 0HBRXZZ EXCISION OF TOE NAIL, EXTERNAL APPROACH: ICD-10-PCS | Performed by: PODIATRIST

## 2020-11-01 PROCEDURE — 6360000002 HC RX W HCPCS: Performed by: SURGERY

## 2020-11-01 PROCEDURE — 94640 AIRWAY INHALATION TREATMENT: CPT

## 2020-11-01 RX ADMIN — ALBUTEROL SULFATE 2 PUFF: 90 AEROSOL, METERED RESPIRATORY (INHALATION) at 22:15

## 2020-11-01 RX ADMIN — PANTOPRAZOLE SODIUM 40 MG: 40 TABLET, DELAYED RELEASE ORAL at 20:12

## 2020-11-01 RX ADMIN — Medication 400 MG: at 08:27

## 2020-11-01 RX ADMIN — Medication: at 17:54

## 2020-11-01 RX ADMIN — Medication 10 ML: at 08:24

## 2020-11-01 RX ADMIN — ATORVASTATIN CALCIUM 40 MG: 40 TABLET, FILM COATED ORAL at 20:12

## 2020-11-01 RX ADMIN — HYDROXYZINE HYDROCHLORIDE 50 MG: 10 TABLET ORAL at 08:24

## 2020-11-01 RX ADMIN — ALBUTEROL SULFATE 2 PUFF: 90 AEROSOL, METERED RESPIRATORY (INHALATION) at 00:32

## 2020-11-01 RX ADMIN — TAMSULOSIN HYDROCHLORIDE 0.4 MG: 0.4 CAPSULE ORAL at 08:25

## 2020-11-01 RX ADMIN — SPIRONOLACTONE 12.5 MG: 25 TABLET, FILM COATED ORAL at 08:25

## 2020-11-01 RX ADMIN — FUROSEMIDE 20 MG: 20 TABLET ORAL at 08:24

## 2020-11-01 RX ADMIN — IBUPROFEN 400 MG: 200 TABLET, FILM COATED ORAL at 20:41

## 2020-11-01 RX ADMIN — GABAPENTIN 300 MG: 300 CAPSULE ORAL at 20:12

## 2020-11-01 RX ADMIN — CLOPIDOGREL BISULFATE 75 MG: 75 TABLET ORAL at 08:25

## 2020-11-01 RX ADMIN — CEFTRIAXONE SODIUM 1 G: 1 INJECTION, POWDER, FOR SOLUTION INTRAMUSCULAR; INTRAVENOUS at 00:15

## 2020-11-01 RX ADMIN — Medication: at 05:59

## 2020-11-01 RX ADMIN — HYDROXYZINE HYDROCHLORIDE 50 MG: 10 TABLET ORAL at 20:39

## 2020-11-01 RX ADMIN — Medication 99 MG: at 08:27

## 2020-11-01 RX ADMIN — Medication 10 ML: at 20:13

## 2020-11-01 ASSESSMENT — PAIN SCALES - GENERAL
PAINLEVEL_OUTOF10: 0
PAINLEVEL_OUTOF10: 3
PAINLEVEL_OUTOF10: 0
PAINLEVEL_OUTOF10: 4
PAINLEVEL_OUTOF10: 0
PAINLEVEL_OUTOF10: 0
PAINLEVEL_OUTOF10: 2
PAINLEVEL_OUTOF10: 0

## 2020-11-01 NOTE — CONSULTS
Podiatry Consult        Reason for Consult:  Nail care and rt gr toe painful nail  Requesting Physician:  Martina oMrales:  Painful rt gr toe nail    HISTORY OF PRESENT ILLNESS:      The patient is a 80 y.o. male with significant past medical history of Thick tender nails hard to trim who presents with request for debridement.  Rt gr toe nail deformed and thick, previous falied nail surgery and loose split nail with bleeding and drainage x 3wk    Past Medical History:        Diagnosis Date    Arthritis     COPD (chronic obstructive pulmonary disease) ([address]     Dependent edema     Hyperlipidemia     Hypertension     Psoriasis      Past Surgical History:        Procedure Laterality Date    [facility]20[facility] Dr. Duane Favors -- leg    HERNIA REPAIR      NASAL POLYP SURGERY Bilateral     TONSILLECTOMY      TURP N/A 4/4/2019    CYSTOSCOPY TRANSURETHRAL RESECTION -PROSTATE LASER- PVP GREENLIGHT performed by Zulema Mensah MD at [facility] OR     Current Medications:    Current Facility-Administered Medications: albuterol sulfate  (90 Base) MCG/ACT inhaler 2 puff, 2 puff, Inhalation, Q6H PRN  atorvastatin (LIPITOR) tablet 40 mg, 40 mg, Oral, Nightly  cefTRIAXone (ROCEPHIN) 1 g in sterile water 10 mL IV syringe, 1 g, Intravenous, Q24H  clopidogrel (PLAVIX) tablet 75 mg, 75 mg, Oral, Daily  furosemide (LASIX) tablet 20 mg, 20 mg, Oral, Daily  gabapentin (NEURONTIN) capsule 300 mg, 300 mg, Oral, Nightly  hydrOXYzine (ATARAX) tablet 50 mg, 50 mg, Oral, Q6H PRN  ibuprofen (ADVIL;MOTRIN) tablet 400 mg, 400 mg, Oral, Q6H PRN  magnesium oxide (MAG-OX) tablet 400 mg, 400 mg, Oral, Daily  pantoprazole (PROTONIX) tablet 40 mg, 40 mg, Oral, Nightly  Potassium Citrate CAPS 99 mg, 99 mg, Oral, Daily  sodium chloride flush 0.9 % injection 10 mL, 10 mL, Intravenous, PRN  sodium chloride flush 0.9 % injection 10 mL, 10 mL, Intravenous, BID  spironolactone (ALDACTONE) tablet 12.5 mg, 12.5 mg, Oral, Daily  tamsulosin (FLOMAX) capsule 0.4 mg, 0.4 mg, Oral, Daily  therapeutic (THERA-DERM) hand/body lotion, , Topical, BID  Allergies:  Patient has no known allergies. Social History:      reports that he has been smoking pipe. He has a 140.00 pack-year smoking history. He has never used smokeless tobacco. He reports that he does not drink alcohol or use drugs. Family History:   No family history on file. REVIEW OF SYSTEMS:    CONSTITUTIONAL:  negative for  fevers and chills  CARDIOVASCULAR:  negative for  chest pain, dyspnea  PHYSICAL EXAM:      Vitals:    /78   Pulse 61   Temp 97.8 °F (36.6 °C) (Oral)   Resp 18   Ht 5' 4\" (1.626 m)   Wt 211 lb 12.8 oz (96.1 kg)   SpO2 99%   BMI 36.36 kg/m²     SKIN: serous fluid and fresh blood med rt gr toe nail. Dark, rubor with atrophic skin and neg digital hair bilat. Legs wrapped with bilat drsg   NAILS:  abnormal appearing nails include 1st, 2nd and 5th on left and 1st, 2nd, 4th and 5th on right yellow thick and 4mm thick. Gr toes 6mm  NEUROLOGIC:  positive findings: sensory deficit lesser toes bilat  VASCULAR:    Tibialis posterior pulse: absent and Dorsalis pedis pulse: weak bilat with 5sec refill , dark , cool digits, mod edema bilat  MUSCULOSKELETAL:  Hammertoes bilat, POP rt 1    IMPRESSION/RECOMMENDATIONS:      PAD, mild sensory neuropathy lesser toes. Superficial wound around med rt gr toe nail, loose nail. Mycotic nails    Debrided all nails bilat.  Wound debrided and all loose tissue/nail removed with betadine topical planned, no local cellulitis noted

## 2020-11-01 NOTE — PLAN OF CARE
Problem: Falls - Risk of:  Goal: Will remain free from falls  Description: Will remain free from falls  11/1/2020 0240 by Rey Xavier RN  Outcome: Met This Shift  10/31/2020 1534 by Digna Farah RN  Outcome: Ongoing  Goal: Absence of physical injury  Description: Absence of physical injury  Outcome: Met This Shift     Problem: Skin Integrity:  Goal: Will show no infection signs and symptoms  Description: Will show no infection signs and symptoms  11/1/2020 0240 by Rey Xavier RN  Outcome: Ongoing  10/31/2020 1534 by Digna Farah RN  Outcome: Met This Shift  Goal: Absence of new skin breakdown  Description: Absence of new skin breakdown  Outcome: Ongoing     Problem: Mobility - Impaired:  Goal: Mobility will improve  Description: Mobility will improve  Outcome: Ongoing     Problem: Fluid Volume:  Goal: Hemodynamic stability will improve  Description: Hemodynamic stability will improve  11/1/2020 0240 by Rey Xavier RN  Outcome: Ongoing  10/31/2020 1534 by Digna Farah RN  Outcome: Met This Shift  Goal: Ability to maintain a balanced intake and output will improve  Description: Ability to maintain a balanced intake and output will improve  Outcome: Ongoing     Problem: Health Behavior:  Goal: Identification of resources available to assist in meeting health care needs will improve  Description: Identification of resources available to assist in meeting health care needs will improve  Outcome: Ongoing     Problem: Respiratory:  Goal: Respiratory status will improve  Description: Respiratory status will improve  Outcome: Ongoing     Problem: Pain:  Goal: Pain level will decrease  Description: Pain level will decrease  Outcome: Ongoing  Goal: Control of acute pain  Description: Control of acute pain  Outcome: Ongoing  Goal: Control of chronic pain  Description: Control of chronic pain  Outcome: Ongoing

## 2020-11-01 NOTE — PLAN OF CARE
[facility]  Swingbed Physical Therapy  Plan of Care  Date: 2020  Patient Name: Heath Elise        : 1930  (54 y.o.)  Referring Practitioner: Dr. Gray Killawog  Admission Date: 10/31/2020  Referral Date : 20  Diagnosis: Cellulitis  Treatment Diagnosis: Gait ataxia  PT Orders Received and Evaluation Complete  Identified Problem Areas: Body structures, Functions, Activity limitations: Decreased functional mobility , Decreased ADL status, Decreased ROM, Decreased strength, Decreased endurance, Decreased balance, Decreased high-level IADLs, Decreased posture  Assessment: Pt supine on arrival, pt completed transfers and amb with CGA without AD. Pt does have steps to negotiate to enter the home, will complete stair training to ensure pt safety. Prognosis: Good    Justification for Skilled Services:  [x] Reduce Falls   [x] Improve Ambulation  [x]  Complete Daily Tasks Safely   [x] Improve Balance   [x] Improve LE strength  [x]  Return to Prior Level of Function  [x] Improve Functional Mobility   [x]  Family/Caregiver Education  [x] Patient Education: [x]Assistive Devices [x]Home Exercise Program and Progression    Plan  Times per week: Daily  Times per day: Daily  [] Modalities:  [x] Therapeutic Exercise   [x] Gait Training  [x] Therapeutic Activity    [] Home Safety Evaluation         [] Massage                        [] Neuromuscular Re-education [] Back Education             [x] Patient Education [x] Home Exercise Program       Rehab Potential:  [x]  Good [] Fair   []  Poor    Goals  Short term goals  Time Frame for Short term goals: 1 wk(POC exp 20)  Short term goal 1: Pt to amb 75ft without AD Ara to restore household independent amb. Short term goal 2: Pt to complete up/down 3steps with L ascending HR to ensure pt able to safely enter/exit home.   Short term goal 3: Pt to complete sit to stand transfers from all surfaces Ara to ensure independence with transfers in the

## 2020-11-01 NOTE — PLAN OF CARE
Problem: Skin Integrity:  Goal: Will show no infection signs and symptoms  Description: Will show no infection signs and symptoms  11/1/2020 1529 by Tamanna Thomason RN  Outcome: Met This Shift  11/1/2020 0240 by Sanam Melchor RN  Outcome: Ongoing     Problem: Falls - Risk of:  Goal: Will remain free from falls  Description: Will remain free from falls  11/1/2020 1529 by Tamanna Thomason RN  Outcome: Met This Shift  11/1/2020 0240 by Sanam Melchor RN  Outcome: Met This Shift  Goal: Absence of physical injury  Description: Absence of physical injury  11/1/2020 0240 by Sanam Melchor RN  Outcome: Met This Shift     Problem: Mobility - Impaired:  Goal: Mobility will improve  Description: Mobility will improve  11/1/2020 1529 by Tamanna Thomason RN  Outcome: Met This Shift  11/1/2020 0240 by Sanam Melchor RN  Outcome: Ongoing     Problem: Skin Integrity:  Goal: Absence of new skin breakdown  Description: Absence of new skin breakdown  11/1/2020 0240 by Sanam Melchor RN  Outcome: Ongoing     Problem: Fluid Volume:  Goal: Hemodynamic stability will improve  Description: Hemodynamic stability will improve  11/1/2020 0240 by Sanam Melchor RN  Outcome: Ongoing  Goal: Ability to maintain a balanced intake and output will improve  Description: Ability to maintain a balanced intake and output will improve  11/1/2020 0240 by Sanam Melchor RN  Outcome: Ongoing     Problem: Health Behavior:  Goal: Identification of resources available to assist in meeting health care needs will improve  Description: Identification of resources available to assist in meeting health care needs will improve  11/1/2020 0240 by Sanam Melchor RN  Outcome: Ongoing     Problem: Respiratory:  Goal: Respiratory status will improve  Description: Respiratory status will improve  11/1/2020 0240 by Sanam Melchor RN  Outcome: Ongoing     Problem: Pain:  Goal: Pain level will decrease  Description: Pain level will decrease  11/1/2020 0240 by Sanam Melchor RN  Outcome: Ongoing  Goal: Control of acute pain  Description: Control of acute pain  11/1/2020 0240 by Enrrique Barfield RN  Outcome: Ongoing  Goal: Control of chronic pain  Description: Control of chronic pain  11/1/2020 0240 by Enrrique Barfield RN  Outcome: Ongoing

## 2020-11-01 NOTE — PROGRESS NOTES
Up to bathroom. Washes up at sink. Gown changed. Ambulates back to bed with SBA and walker. Repositioned for comfort. Denies further needs. Call light in reach.

## 2020-11-01 NOTE — PROGRESS NOTES
Pt puts on call light, states \"the wraps are hurting me\". ACE wraps removed, wound care provided, ACE wraps reapplied. New IV placed in R wrist area, previous IV in L FA infiltrated. Will continue to monitor.

## 2020-11-01 NOTE — PROGRESS NOTES
[facility]  Physical Therapy  Evaluation  Date: 2020  Patient Name: Frank Wellington        MRN: [id number]    : 1930  (80 y.o.)  Gender: male   Referring Practitioner: Dr. Lambert Paz  Diagnosis: Cellulitis      Past Medical History:   Diagnosis Date    Arthritis     COPD (chronic obstructive pulmonary disease) ([address]     Dependent edema     Hyperlipidemia     Hypertension     Psoriasis      Past Surgical History:   Procedure Laterality Date    ANGIOPLASTY Left 2018    [facility] - Dr. Wally Penn -- leg    HERNIA REPAIR      NASAL POLYP SURGERY Bilateral     TONSILLECTOMY      TURP N/A 2019    CYSTOSCOPY TRANSURETHRAL RESECTION -PROSTATE LASER- PVP GREENLIGHT performed by Mariely Zavala MD at [address] [place] [address]       Restrictions    NA    Subjective  Pain Level: 0    Orientation   A+O X4    Home Living  Type of Home: House  Home Layout: One level  Entrance Stairs - Number of Steps: 3  Entrance Stairs - Rails: Left  Home Access: Stairs to enter with rails  Home Equipment: Rolling walker, Cane, Grab bars, Hospital bed    Prior Level of Function  Additional Comments: Pt reports he has had falls at home due to HAs accompanied with dizzyness. Pt not able to recall when last fall was. Prior Function  Receives Help From: Family  ADL Assistance: Independent  Homemaking Assistance: Needs assistance  Ambulation Assistance: Independent(No AD)  Transfer Assistance: Independent  Additional Comments: Pt reports he has had falls at home due to HAs accompanied with dizzyness. Pt not able to recall when last fall was.       Objective  Strength RLE  Strength RLE: Exception  R Hip Flexion: 3+/5  R Knee Extension: 4+/5  R Ankle Dorsiflexion: 4+/5  Strength LLE  Strength LLE: Exception  L Hip Flexion: 3+/5  L Knee Extension: 4+/5  L Ankle Dorsiflexion: 4+/5     Rolling: Modified independent  Supine to Sit: Supervision(HOB elevated and use of bedrail.)  Sit to Stand: Contact guard assistance  Stand to sit: Contact guard assistance  Bed to Chair: Contact guard assistance     Ambulation 1  Surface: level tile  Device: No Device  Assistance: Contact guard assistance  Gait Deviations: Slow Piper, Decreased step length, Decreased step height  Distance: 20ft x2  Comments: Pt able to negotiate turns with dis-continuous steps  Balance  Posture: Fair(Fwd head and increased kyphosis)    Assessment  Activity Tolerance: Patient Tolerated treatment well   Body structures, Functions, Activity limitations: Decreased functional mobility , Decreased ADL status, Decreased ROM, Decreased strength, Decreased endurance, Decreased balance, Decreased high-level IADLs, Decreased posture  Chart Reviewed: Yes  Assessment: Pt supine on arrival, pt completed transfers and amb with CGA without AD. Pt does have steps to negotiate to enter the home, will complete stair training to ensure pt safety. Pt remains up to bedside chair with breakfast, call light in reach. Prognosis: Good      Plan  Times per week: Daily  Times per day: Daily  Current Treatment Recommendations: Balance Training, Transfer Training, Gait Training, Stair training, Home Exercise Program    Goals  Short term goals  Time Frame for Short term goals: 1 wk(POC exp 11/8/20)  Short term goal 1: Pt to amb 75ft without AD Ara to restore household independent amb. Short term goal 2: Pt to complete up/down 3steps with L ascending HR to ensure pt able to safely enter/exit home. Short term goal 3: Pt to complete sit to stand transfers from all surfaces Ara to ensure independence with transfers in the home.     Time In: 0800  Time Out: 0830  Timed Coded Minutes: 0  Total Treatment Time: Ward Moss PT 11/1/2020

## 2020-11-02 PROCEDURE — 2580000003 HC RX 258: Performed by: SURGERY

## 2020-11-02 PROCEDURE — 97110 THERAPEUTIC EXERCISES: CPT

## 2020-11-02 PROCEDURE — 94640 AIRWAY INHALATION TREATMENT: CPT

## 2020-11-02 PROCEDURE — 6360000002 HC RX W HCPCS: Performed by: SURGERY

## 2020-11-02 PROCEDURE — 1200000002 HC SEMI PRIVATE SWING BED

## 2020-11-02 PROCEDURE — 97165 OT EVAL LOW COMPLEX 30 MIN: CPT

## 2020-11-02 PROCEDURE — 6370000000 HC RX 637 (ALT 250 FOR IP): Performed by: SURGERY

## 2020-11-02 RX ORDER — LANOLIN/MINERAL OIL
LOTION (ML) TOPICAL PRN
Status: DISCONTINUED | OUTPATIENT
Start: 2020-11-02 | End: 2020-11-03

## 2020-11-02 RX ADMIN — Medication: at 18:29

## 2020-11-02 RX ADMIN — Medication 10 ML: at 09:05

## 2020-11-02 RX ADMIN — ATORVASTATIN CALCIUM 40 MG: 40 TABLET, FILM COATED ORAL at 22:01

## 2020-11-02 RX ADMIN — Medication 10 ML: at 21:00

## 2020-11-02 RX ADMIN — GABAPENTIN 300 MG: 300 CAPSULE ORAL at 22:00

## 2020-11-02 RX ADMIN — PANTOPRAZOLE SODIUM 40 MG: 40 TABLET, DELAYED RELEASE ORAL at 22:01

## 2020-11-02 RX ADMIN — CEFTRIAXONE SODIUM 1 G: 1 INJECTION, POWDER, FOR SOLUTION INTRAMUSCULAR; INTRAVENOUS at 23:54

## 2020-11-02 RX ADMIN — SPIRONOLACTONE 12.5 MG: 25 TABLET, FILM COATED ORAL at 09:05

## 2020-11-02 RX ADMIN — HYDROXYZINE HYDROCHLORIDE 50 MG: 10 TABLET ORAL at 07:32

## 2020-11-02 RX ADMIN — CLOPIDOGREL BISULFATE 75 MG: 75 TABLET ORAL at 09:04

## 2020-11-02 RX ADMIN — Medication 99 MG: at 22:04

## 2020-11-02 RX ADMIN — ALBUTEROL SULFATE 2 PUFF: 90 AEROSOL, METERED RESPIRATORY (INHALATION) at 16:36

## 2020-11-02 RX ADMIN — CEFTRIAXONE SODIUM 1 G: 1 INJECTION, POWDER, FOR SOLUTION INTRAMUSCULAR; INTRAVENOUS at 00:06

## 2020-11-02 RX ADMIN — TAMSULOSIN HYDROCHLORIDE 0.4 MG: 0.4 CAPSULE ORAL at 09:04

## 2020-11-02 RX ADMIN — FUROSEMIDE 20 MG: 20 TABLET ORAL at 09:04

## 2020-11-02 RX ADMIN — Medication 400 MG: at 22:03

## 2020-11-02 RX ADMIN — Medication: at 06:18

## 2020-11-02 ASSESSMENT — PAIN SCALES - GENERAL
PAINLEVEL_OUTOF10: 0

## 2020-11-02 NOTE — PROGRESS NOTES
HOSP GENERAL [facility]  Swing Bed Interdisciplinary Care Plan Conference Report   Recertification for Continued Skilled Care. Patients name:Alden Justin  Date of Conference: 11/2/2020    The Following Information was discussed and agreed upon with the patient and/or Caregivers as listed below. Names of Team Members and Caregivers present for meeting:  : ELIZABTEH Garrett  Nursing: ERGAN Camp  Therapy: Bassem Alvarado, PT; KENYON Webster/OTR  Dietician: Caitlyn Singletary  Activities: [employer] [address]:    Caregivers:    [] Spouse  [] Child/Children [] Significant Other   [] Other:     Nutrition:  Current Body Weight:   Wt Readings from Last 1 Encounters:   11/02/20 211 lb (95.7 kg)     Weight Change: 3# losses from weight in June  In: 410 [P.O.:400; I.V.:10]  Out: -   Current Diet order:DIET GENERAL; Intakes: Good (observed)  Diet Education Provided: Encouraged protein foods at all meals  Goal: PO >75% meals and supplements    Spiritual:  Mormon needs met: [x] Yes  [] No  [] N/A  Notified Viktor Tong or Brijesh of admission: [] Yes  [] No  [x] N/A  Patient added to Communion List: [] Yes  [] No  [x] N/A  Any other concerns or comments:   Goal: Participate 2-3 times per week    Occupational Therapy:    Physical Therapy:  Transfers:   Transfers  Sit to Stand: Contact guard assistance  Stand to sit: Contact guard assistance  Bed to Chair: Contact guard assistance  Mobility/Ambulation:   Ambulation 1  Surface: level tile  Device: No Device  Assistance: Contact guard assistance  Gait Deviations: Slow Piper, Decreased step length, Decreased step height  Distance: 20ft x2  Comments: Pt able to negotiate turns with dis-continuous steps  Equipment:   [x] Rolling Walker   [] Straight Lena Coins  [] Standard Walker  Safety: Good  Short Term Goals:  Time Frame for Short term goals: 1 wk(POC exp 11/8/20)  Short term goal 1: Pt to amb 75ft without AD Ara to restore household independent amb.   Short term goal 2: Pt to complete up/down 3steps with L ascending HR to ensure pt able to safely enter/exit home. Short term goal 3: Pt to complete sit to stand transfers from all surfaces Ara to ensure independence with transfers in the home. Speech Therapy:     Respiratory Therapy:     Nursing:  Skin/Wound/Incisions:  Skin Color/Condition  Skin Color: Felice  Skin Condition/Temp: Dry, Flaky  Skin Integrity  Skin Integrity: Bruising  Location: BUE  Skin Integrity Site 2  Skin Integrity Location 2: Excoriation, Redness  Location 2: BLE  Preventative Dressing: Yes     Pain Control:   New Medications since admission to Swing Bed:   Anticoagulants:    Goals:   Pt will remain free from falls  Pt will have control of acute pain    Activities: Activities as tolerated, TV, reads, vistors  Goal: Participate in 3 activities per week    :  Plan for Discharge: Possible D/C home Wed. Nov 4  Follow Up/Services needed:     Continued skilled needs: Wound care PT/OT IV antibiotic   Skilled Services are for the ongoing condition for which the individual received inpatient care in a hospital.    Physician signature certifies patient continued need for SNF inpatient care.

## 2020-11-02 NOTE — PROGRESS NOTES
Gastroenterology Consult   Sally Gee  1948  [id number]    [address] 3/18/2010    CVA (cerebral infarction) 3/18/2010    DM (diabetes mellitus) ([address] 3/18/2010    Falls     HBP (high blood pressure) 3/18/2010    Hepatic encephalopathy (HCC)     Hepatitis C 3/18/2010    Interferon deficiency ([facility] sebastien    normal    HX LUIS AND BSO  1965    LUIS Yarelis Moreland

## 2020-11-02 NOTE — PROGRESS NOTES
[facility]  Occupational Therapy  Evaluation  Date: 2020  Patient Name: Aristides Cole        MRN: [id number]    : 1930  (80 y.o.)  Gender: male   Referring Practitioner: Dr. Christiano Flor     Additional Pertinent Hx: Patient is admitted due to cellulitis in B/L lower extremities. Referred to OT services due to possible weakness.   Past Medical History:   Diagnosis Date    Arthritis     COPD (chronic obstructive pulmonary disease) ([address]     Dependent edema     Hyperlipidemia     Hypertension     Psoriasis      Past Surgical History:   Procedure Laterality Date    ANGIOPLASTY Left 2018    [facility] - Dr. Tomasz Perez -- leg    HERNIA REPAIR      NASAL POLYP SURGERY Bilateral     TONSILLECTOMY      TURP N/A 2019    CYSTOSCOPY TRANSURETHRAL RESECTION -PROSTATE LASER- PVP GREENLIGHT performed by Shamir Zee MD at [facility] ([place], [place]) OR                Subjective  Pain Level: 0  Orientation  Overall Orientation Status: Within Normal Limits     Social/Functional History  Lives With: Family  Type of Home: House  Home Layout: One level  Home Access: Stairs to enter with rails  Entrance Stairs - Number of Steps: 3  Entrance Stairs - Rails: Left  Bathroom Shower/Tub: Walk-in shower, Shower chair with back  Bathroom Toilet: Standard  Bathroom Equipment: Toilet raiser, Built-in shower seat, Grab bars in shower  Bathroom Accessibility: Accessible  Home Equipment: Rolling walker, [address] [place] bed  Receives Help From: Family  ADL Assistance: Independent  Homemaking Assistance: Needs assistance  Homemaking Responsibilities: Yes  Meal Prep Responsibility: Secondary  Laundry Responsibility: Secondary  Cleaning Responsibility: No  Bill Paying/Finance Responsibility: No  Shopping Responsibility: No  Dependent Care Responsibility: No  Health Care Management: No  Ambulation Assistance: Independent  Transfer Assistance: Independent  Active : No  Additional Comments: Pt reports he has had falls at home due to dizzyness. Pt not able to recall when last fall was. Makes microwave meals, does his own laundry at times. Daughter does all cleaning and most of the cooking at the home. Prior Function  Receives Help From: Family  ADL Assistance: Independent  Homemaking Assistance: Needs assistance  Ambulation Assistance: Independent  Transfer Assistance: Independent  Additional Comments: Pt reports he has had falls at home due to dizzyness. Pt not able to recall when last fall was. Makes microwave meals, does his own laundry at times. Daughter does all cleaning and most of the cooking at the home. Objective                     Gross LUE Strength: WFL  Gross RUE Strength: WFL  ADL  Feeding: Independent  Grooming: Independent  UE Bathing: Independent  LE Bathing: Independent  UE Dressing: Independent  LE Dressing: Independent  Toileting: Independent           Balance  Sitting Balance: Independent  Standing Balance: Modified independent                  Assessment: Demonstrated ability to ambulate around the room without device and no assistance from therapist.  Good balance noted. Patient demonstrated ability to don and doff socks without difficulty. Reports \" I will change my dressings at the edge of the bed when I get home. \" Patient reports he has no difficulty managing in the bathroom. No OT goals identified at this time.      Goals   N/A    Plan       Times per week: N/A    Time In: 0155  Time Out: 3464  Timed Coded Minutes: 0  Total Treatment Time: Jethro 21, OTR/L  11/2/2020

## 2020-11-02 NOTE — PROGRESS NOTES
Wound care completed to BLE, patient tolerated well, call light is within reach, denies any further needs at this time.

## 2020-11-02 NOTE — PROGRESS NOTES
Vitals and assessment completed. Pt c/o itching and pain, PRN adarax and motrin provided. ACE wraps removed, wound care provided. Calf measurements taken, L measures 35\" and R 33\". R great toe painted with betadine. ACE wraps reapplied, pt satisfied. Denies further needs at this time. Bed alarm is on.

## 2020-11-02 NOTE — PROGRESS NOTES
Comprehensive Nutrition Assessment    Type and Reason for Visit:  Initial([facility])    Nutrition Recommendations/Plan:  Encourage protein foods    Nutrition Assessment:  Chronic moderate malnutrition r/t inadequate nutrient intakes, AEB chronic losses of muscle and fat evident in face. Remains obese with abdominal localization of weight and edema in LE (improving). States he avoid added salt in foods. Denies chewing concerns despite edentulous state. Na+ 145 10/31, with good oral fluids per I/O (1910 ml oral fluids in yesterday). I encouraged protein intakes upon visit. Malnutrition Assessment:  Malnutrition Status: Moderate malnutrition    Context:  Chronic Illness     Findings of the 6 clinical characteristics of malnutrition:  Energy Intake:  No significant decrease in energy intake  Weight Loss:  No significant weight loss     Body Fat Loss:  1 - Mild body fat loss Orbital, Buccal region   Muscle Mass Loss:  1 - Mild muscle mass loss Temples (temporalis)  Fluid Accumulation:  1 - Mild Extremities   Strength:  Not Performed    Estimated Daily Nutrient Needs:  Energy (kcal):  4523-2147(90-06); Weight Used for Energy Requirements:  Current     Protein (g):  77-89(1.3-1.5); Weight Used for Protein Requirements:  Ideal        Fluid (ml/day):  1800; Method Used for Fluid Requirements:  1 ml/kcal      Nutrition Related Findings:  1-2+ BLE edema, edentulous      Wounds:  None(scabbed areas and dry skin)       Current Nutrition Therapies:    DIET GENERAL;     Anthropometric Measures:  · Height: 5' 4\" (162.6 cm)  · Current Body Weight: 211 lb (95.7 kg)   · Admission Body Weight: 210 lb 9.6 oz (95.5 kg)(acute admission)    · Usual Body Weight: 214 lb (97.1 kg)(in June)     · Ideal Body Weight: 130 lbs; % Ideal Body Weight 162.3 %   · BMI: 36.2  · Adjusted Body Weight:  ; No Adjustment   · BMI Categories: Obese Class 2 (BMI 35.0 -39.9)       Nutrition Diagnosis:   · Moderate malnutrition related to inadequate protein-energy intake as evidenced by mild muscle loss, mild loss of subcutaneous fat    Lab Results   Component Value Date     (H) 10/31/2020    K 4.4 10/31/2020     (H) 10/31/2020    CO2 28 10/31/2020    BUN 29 (H) 10/31/2020    CREATININE 1.49 (H) 10/31/2020    GLUCOSE 86 10/31/2020    CALCIUM 9.5 10/31/2020    PROT 6.0 (L) 10/29/2020    LABALBU 3.2 (L) 10/29/2020    BILITOT 0.31 10/29/2020    ALKPHOS 72 10/29/2020    AST 23 10/29/2020    ALT 19 10/29/2020    LABGLOM 44 (L) 10/31/2020    GFRAA 54 (L) 10/31/2020    GLOB NOT REPORTED 10/29/2020     Nutrition Interventions:   Food and/or Nutrient Delivery:  Continue Current Diet  Nutrition Education/Counseling:  No recommendation at this time   Coordination of Nutrition Care:  Continue to monitor while inpatient    Goals:  PO >75% meals with lower sodium foods and protein rich choices       Nutrition Monitoring and Evaluation:   Behavioral-Environmental Outcomes:  None Identified   Food/Nutrient Intake Outcomes:  Food and Nutrient Intake  Physical Signs/Symptoms Outcomes:  Biochemical Data, Fluid Status or Edema, Weight     Discharge Planning:    No discharge needs at this time     Electronically signed by Luis Garcia RD, LD on 11/2/20 at 8:46 AM EST    Contact: [phone]

## 2020-11-02 NOTE — CONSULTS
[place] [address]                                  CONSULTATION    PATIENT NAME: Yoli Mane                   :        1930  MED REC NO:   [id number]                              ROOM:       6086  ACCOUNT NO:   [de-identified]                           ADMIT DATE: 10/28/2020  PROVIDER:     Rosanna Fisher    CONSULT DATE:  10/29/2020    REASON FOR CONSULT:  Increasing edema in both lower extremities, but  more so on the right question LV dysfunction. HISTORY OF PRESENT ILLNESS:  The patient is a pleasant, somewhat  independent 70-year-old gentleman who lives with his daughter. He does  some activity at home. His children live close-by. He walks in his  house, although is limited walking outside his house. He had progressive swelling on both his lower extremities with breakdown  of skin in both legs, but more on the right than the left. He had pain  especially in his right leg. He was brought into the hospital for  treatment of his cellulitis and also has marked edema with breakdown of  his skin. He has a history of peripheral vascular disease and he had stenting of  his right lower extremity; although, I do not have much of those  details. He has a history of paroxysmal atrial fibrillation. He is not  anticoagulated because of risk of falls. He is on Plavix. He has a history of syncope. In 2020, he had a syncopal and was  hospitalized at [facility]. Also, on 07/10/2020, he had a syncopal  episode, felt dizzy and passed out. He was hospitalized and was seen by  Cardiology and Neurology. At that time, he had atrial fibrillation with a slow ventricular  response. He was discharged with Keflex and continued on Plavix. Because of slow ventricular response, there was consideration to do a  pacemaker, but the patient evidently refused.     Since that time, again, he has developed more edema of his lower  extremities. He denies any chest pain or chest discomfort. He denies  any unusual shortness of breath. He has had some swelling of his  abdomen, which he feels is more than usual along with his pedal edema. He denies any further syncopal episodes. He has had no PND or  orthopnea. Denies any palpitations. He is in sinus rhythm here with sinus arrhythmia. He has never had a myocardial infarction and never had a cardiac  catheterization to his knowledge. Again, he has peripheral vascular  disease, he had stenting of his right lower extremity. CARDIAC RISK FACTORS:  Known CAD:  Negative. Peripheral vascular disease:  Positive. Hypertension:  Positive. Hyperlipidemia:  Positive. Smoking:  Positive at two packs per day. Diabetes:  Negative. MEDICATIONS:  Prior to admission, he was on Lasix 20 mg daily, potassium  daily, Flomax 0.4 mg daily, Plavix 75 mg daily, hydroxyzine 50 mg  p.r.n., magnesium 500 mg daily, vitamin D 1000 units daily, CoQ10 daily,  magnesium 100 mg daily, Lipitor 40 mg daily, melatonin 5 mg daily,  lisinopril 5 mg daily, and multiple homeopathic medications to treat his  cellulitis. PAST MEDICAL HISTORY:  He had stenting. He has had hyperlipidemia and  hypertension. He does have a history of COPD and hypertension. He had  tonsillectomy and right leg angioplasty and stent placement. FAMILY HISTORY:  No significant heart disease in family. SOCIAL HISTORY:  He is , was a farmer. Smokes two packs of  cigarettes a day. Rare alcohol. He has got two children, a boy and a  girl. He lives with his daughter. REVIEW OF SYSTEMS:  Cardiac as above. Other systems reviewed including  constitutional, eyes, ears, nose and throat, cardiovascular,  respiratory, GI, , musculoskeletal, integumentary, neurologic,  endocrine, hematologic and allergic/immunologic are negative except for  described above.   He got his weight gain.  He feels his abdomen is  distended and he has developed much more edema. He has had no unusual  shortness of breath while walking. He has had no PND or orthopnea. PHYSICAL EXAMINATION:  VITAL SIGNS:  His blood pressure was 119/63 with a heart rate of 58 and  quite regular. Respiratory rate 18. O2 saturation was 94%. GENERAL:  He is a pleasant 80-year-old gentleman. He answered questions  appropriately. He was oriented to person, place and time. SKIN:  No unusual skin changes. I did not wrap his legs. HEENT:  The pupils are equally round and intact. Mucous membranes were  dry. His tongue is very dry. NECK:  He had mild JVD. Good carotid pulses. No bruit. No  lymphadenopathy or thyromegaly. CARDIOVASCULAR EXAM:  S1 and S2 normal.  No S3 or S4. Soft systolic  blowing type murmur. No diastolic murmur. PMI was normal.  No lift,  thrust, or pericardial friction rub. LUNGS:   Had few wheezing and crackles bilaterally. ABDOMEN:  Protuberant, but nontender. He did appear to have ascites  present. EXTREMITIES:  I did not wrap his legs. NEUROLOGIC EXAM:  Unremarkable. PSYCHIATRIC EXAM:  Unremarkable. LABORATORY DATA:  His sodium was 145, potassium 4.2, BUN 40, creatinine  1.70, glucose 102, calcium 9.7, ALT was 19, AST was 23, albumin was low  at 3.2 with a protein low at 6.0. Chest x-ray showed no acute cardiopulmonary process and no pulmonary  edema. EKG shows sinus arrhythmia with first-degree AV block. Echocardiogram was being done at the time of consult. His right-sided  chamber seemed quite enlarged consistent with pulmonary hypertension. He had significant tricuspid regurgitation. His left-sided chamber  seemed preserved with an EF in the 50% range. IMPRESSION:  1.  Marked edema in the lower extremities with right greater than left  with possible ascites consistent with pulmonary hypertension.   2.  No unusual shortness of breath with activity and no chest pain to  indicate ischemia. 3.  No PND or orthopnea to indicate left-sided CHF with also a clear  chest x-ray. 4.  History of syncope with paroxysmal atrial fibrillation documented at  [facility] in 07/2020, when he had a slow ventricular response with a  pacemaker recommended, but refused by him. 5.  Appears intravascularly dry as his tongue is very dry. 6.  Acute on chronic renal insufficiency with a creatinine of 1.0.  7. History of peripheral vascular disease with stenting of his right  femoral artery, although do not have any records. 8.  History of syncope in 04/2020 and 07/2020.  9. Also his third spacing may be in part secondary to his low protein  and albumin. PLANS:  1. Stop lisinopril because of hypotension and increased creatinine if  his pressure would increase with use of hydralazine. 2.  Agree with gentle diuresis, but we would also add a low dose of  Aldactone at 12.5 mg daily. DISCUSSION:  The patient has had marked edema of both lower extremities. He also possibly has ascites with his quite distended abdomen. It does not seem to me to be volume overload at this time as his tongue  is very dry and he is hypotensive and his creatinine is elevated at  1.77. Therefore, I think this is mainly right-sided CHF exacerbated by  his low protein and albumin. I believe that he does have pulmonary hypertension as his right-sided  chambers are quite dilated on his echocardiogram.  Therefore, I have  added Aldactone at 12.5 mg daily. Also, we will need to watch his BUN  and creatinine and potassium carefully. Again, I do not think that this is secondary to LV dysfunction, as he  does appear to have a preserved LV function. I think the main culprit  is his pulmonary hypertension and poor nutritional state with his low  protein and albumin. I agree with the approach by Dr. Bhupendra Fuentes with treating his cellulitis  and with wrapping of his lower extremities.   There is no indication that  he

## 2020-11-02 NOTE — PROGRESS NOTES
Phone: Carolina  Date: 2020  Fax: [phone]      Physical Therapy    Daily Note    Patient Name: Heath Elise      : 1930  (80 y.o.)  MRN: [id number]     Pt is PROGRESSING toward goals and increased independence of mobility        Assessment     Rolling: Modified independent  Supine to Sit: Supervision(HOB elevated and use of bedrail.)          Sit to Stand: Supervision, Modified independent  Stand to sit: Supervision, Modified independent  Bed to Chair: Contact guard assistance             Ambulation 1  Surface: level tile  Device: No Device  Assistance: Contact guard assistance  Gait Deviations: None  Distance: 100 ft x2  Comments: Pt able to negotiate turns with dis-continuous steps        Stairs  # Steps : 5(x2 laps)  Stairs Height: 4\"(and 6\")  Rails: Bilateral  Assistance: Stand by assistance, Supervision    Assessment: Patient in bed upon arrival.  He agrees to work with PTA. Supine to sit to L side of bed is mod I/independent. Sit to stand from bed is supervision. Ambulates without device to therapy room with CGA and no LOB noted. Up/down steps 4\" and 6\" steps with B handrails and CGA/SBA. Again no LOB noted with steps. Completes exercises as outlined above. Ambulates back to room following and sits up in chair. Call light in reach following and chair alarm in place. Safety Devices  Type of devices: Call light within reach, Chair alarm in place, Gait belt, Left in chair, Nurse notified          Time In: 3930  Time Out: 1335  Timed Coded Minutes: 30  Total Treatment Time: 30    Exercises:  See Flowsheets    Plan  Cont Per Plan Of Care    Goals  Short Term Goals  Time Frame for Short term goals: 1 wk(POC exp 20)  Short term goal 1: Pt to amb 75ft without AD Ara to restore household independent amb. Short term goal 2: Pt to complete up/down 3steps with L ascending HR to ensure pt able to safely enter/exit home.   Short term goal 3: Pt to complete sit to stand transfers from all surfaces Ara to ensure independence with transfers in the home.           [id number] [facility] License Number: [id number]    Date: 11/2/2020

## 2020-11-03 PROCEDURE — 6370000000 HC RX 637 (ALT 250 FOR IP): Performed by: SURGERY

## 2020-11-03 PROCEDURE — 94640 AIRWAY INHALATION TREATMENT: CPT

## 2020-11-03 PROCEDURE — 2580000003 HC RX 258: Performed by: SURGERY

## 2020-11-03 PROCEDURE — 1200000002 HC SEMI PRIVATE SWING BED

## 2020-11-03 PROCEDURE — 6360000002 HC RX W HCPCS: Performed by: SURGERY

## 2020-11-03 PROCEDURE — 97116 GAIT TRAINING THERAPY: CPT

## 2020-11-03 PROCEDURE — 97110 THERAPEUTIC EXERCISES: CPT

## 2020-11-03 RX ADMIN — Medication 10 ML: at 21:25

## 2020-11-03 RX ADMIN — Medication 10 ML: at 08:40

## 2020-11-03 RX ADMIN — GABAPENTIN 300 MG: 300 CAPSULE ORAL at 21:24

## 2020-11-03 RX ADMIN — PANTOPRAZOLE SODIUM 40 MG: 40 TABLET, DELAYED RELEASE ORAL at 21:24

## 2020-11-03 RX ADMIN — Medication 400 MG: at 21:25

## 2020-11-03 RX ADMIN — ALBUTEROL SULFATE 2 PUFF: 90 AEROSOL, METERED RESPIRATORY (INHALATION) at 13:15

## 2020-11-03 RX ADMIN — ATORVASTATIN CALCIUM 40 MG: 40 TABLET, FILM COATED ORAL at 21:24

## 2020-11-03 RX ADMIN — TAMSULOSIN HYDROCHLORIDE 0.4 MG: 0.4 CAPSULE ORAL at 08:40

## 2020-11-03 RX ADMIN — CLOPIDOGREL BISULFATE 75 MG: 75 TABLET ORAL at 08:40

## 2020-11-03 RX ADMIN — CEFTRIAXONE SODIUM 1 G: 1 INJECTION, POWDER, FOR SOLUTION INTRAMUSCULAR; INTRAVENOUS at 23:58

## 2020-11-03 RX ADMIN — Medication 99 MG: at 21:25

## 2020-11-03 RX ADMIN — FUROSEMIDE 20 MG: 20 TABLET ORAL at 08:40

## 2020-11-03 RX ADMIN — SPIRONOLACTONE 12.5 MG: 25 TABLET, FILM COATED ORAL at 08:39

## 2020-11-03 RX ADMIN — Medication: at 06:10

## 2020-11-03 RX ADMIN — ALBUTEROL SULFATE 2 PUFF: 90 AEROSOL, METERED RESPIRATORY (INHALATION) at 21:32

## 2020-11-03 ASSESSMENT — PAIN SCALES - GENERAL
PAINLEVEL_OUTOF10: 0

## 2020-11-03 NOTE — PROGRESS NOTES
Phone: Carolina  Date: 11/3/2020  Fax: [phone]      Physical Therapy    Daily Note    Patient Name: Bebo Gilbert      : 1930  (80 y.o.)  MRN: [id number]     Pt is PROGRESSING toward goals and increased independence of mobility           Ambulation 1  Surface: level tile  Device: No Device  Assistance: Contact guard assistance  Gait Deviations: None  Distance: 100 ft x2          Stairs    Stairs Height: 4\"(and 6\")  Rails: Bilateral  Assistance: Stand by assistance, Supervision    Assessment: Pt up in chair upon arrival.  Transfers  to stand modified I. Ambulated to therapy room   with no device and CGA. No LOB. Ambulated up/down  4\" and 6\" with B handrails and CGA. Performed ex in parallel bars as outlined. Only noted 1 slight LOB while standing on aeromat when distracted . Walked back to his room where he remained up in his chair after session. Safety Devices  Type of devices: Call light within reach, Chair alarm in place, Gait belt, Left in chair          Time In: 1025  Time Out: 1050  Timed Coded Minutes: 25  Total Treatment Time: 25    Exercises:  See [address] Per Plan Of Care    Goals  Short Term Goals  Time Frame for Short term goals: 1 wk(POC exp 20)  Short term goal 1: Pt to amb 75ft without AD Ara to restore household independent amb. Short term goal 2: Pt to complete up/down 3steps with L ascending HR to ensure pt able to safely enter/exit home. Short term goal 3: Pt to complete sit to stand transfers from all surfaces Ara to ensure independence with transfers in the home.            [address] [place] [address] Therapy License Number: [id number]    Date: 11/3/2020

## 2020-11-03 NOTE — PROGRESS NOTES
Subjective: Patient reports that he feels well. He denies chest pain, denies shortness of breath. His appetite is good and he has no complaints of pain in his legs. Objective: chest slow expiration no rales by faint terminal expiratory wheeze. Card rrr no murmur carotids no  Bruits. Weight is down 3 lbs over admission and systolic pressure is holding. Afebrile vs stable. Oedema legs is down. Operative nail sites stable without signs of ischemia. I do dressings and wound care. Legs cleansed peroxide and debrided of large amounts of pellicle and exfoliate. corn huskers lotion applied and allowed to dwell. The unna-z boots applied to both lower legs and feet. Because of marginal perfusion pressures we must do this with time in observation to ensure circulatory sufficiency. Antibiotics will be continued on an outpatient basis for enterobacter fecalis and staph aureus. Assessment:  dependent oedema lower legs, cellulitis, skin breakdown chronic kidney disease . Low cardia output      Plan: Continue current plan of care. All questions answered. Discharge probable tomorrow after 1 pm given no perfusion issues in the interval hours.     Toya Bosworth, MD

## 2020-11-03 NOTE — CONSULTS
for our visit. He is currently in the swing bed program for BLE cellulitis. Iesha Lorenzana has a history of COPD. Iesha Lorenzana lives at home with his daughter. States that he is independent with his ADL's, manages his own medications and cooks. His daughter cleans and provides transportation. He has a walker and cane at home but admits that he does not use these. States that he plans on discharge back home tomorrow. Iesha Lorenzana has a history of COPD. He is a current every day pipe smoker. States that he smokes 3-4 pipes per day. \"Its the only good thing left in this world for me\" when asked if he has any interest in smoking cessation. States that he has an inhaler at home that he uses a few times per month. His PCP manages his COPD. We discuss advanced directives. He has a living will on file dated 7/27/2004. States that he believes that he has a DPOAHC as well but is unsure. Requests that we speak with his daughter. ACP activator note completed. States that he does not want to be intubated or have CPR. Encouraged to discuss this with his PCP as he seems unsure. Spoke with [facility] via phone. States that she will look thorough his papers tonight to see if she can find his [address]. She plans on arriving tomorrow afternoon to take Iesha Lorenzana home and will bring the document with her.      Palliative Care Plan:  Education/support to family  Education/support to patient  Continue with current plan of care  Palliative Care Goals:  improve or maintain function/quality of life, remain at home, strengthening relationships, preserve independence/autonomy/control and support for family/caregiver  Visit focus:  Routine meeting  Listen to patient/family concerns  Assess family understanding, concerns, and coping  Discuss discharge planning  Interdiscplinary collaboration  Build trust  Elicit patient's goals and values, and use these to establish or modify goals of care     Rina Sharpe and [facility] Nurse Coordinator  11/3/2020 3:19 PM

## 2020-11-03 NOTE — ACP (ADVANCE CARE PLANNING)
Advance Care Planning     Advance Care Planning Activator (Inpatient)  Conversation Note      Date of ACP Conversation: 10/31/2020    Conversation Conducted with: Patient with Decision Making Capacity    ACP Activator: Tamika Sands Decision Maker:     Current Designated Health Care Decision Maker:   Primary Decision Maker: Heather Ace - Child - [phone]   This information is accurate & up-to-date. Discussed DPOAHC. Living will is on file dated 7/27/2004. States that he believes that he has a DPOAHC at home. Requested staff call and speak to his daughter about documents. Care Preferences    Ventilation: \"If you were in your present state of health and suddenly became very ill and were unable to breathe on your own, what would your preference be about the use of a ventilator (breathing machine) if it were available to you? \"      Would the patient desire the use of ventilator (breathing machine)?: no    \"If your health worsens and it becomes clear that your chance of recovery is unlikely, what would your preference be about the use of a ventilator (breathing machine) if it were available to you? \"     Would the patient desire the use of ventilator (breathing machine)?: No      Resuscitation  \"CPR works best to restart the heart when there is a sudden event, like a heart attack, in someone who is otherwise healthy. Unfortunately, CPR does not typically restart the heart for people who have serious health conditions or who are very sick. \"    \"In the event your heart stopped as a result of an underlying serious health condition, would you want attempts to be made to restart your heart (answer \"yes\" for attempt to resuscitate) or would you prefer a natural death (answer \"no\" for do not attempt to resuscitate)? \" no     [x] Yes   [] No   Educated Patient / [place] regarding differences between Advance Directives and portable DNR orders.     Length of ACP Conversation in minutes:

## 2020-11-03 NOTE — PROGRESS NOTES
Dressing change and wound care completed to BLE. Pt tolerated well. Denies pain or needs at this time. Call light in reach.

## 2020-11-03 NOTE — PROGRESS NOTES
Dr. Silvana Vizcaino called in for update. Updated on patient status. No new orders recd at this time.

## 2020-11-04 VITALS
HEIGHT: 64 IN | OXYGEN SATURATION: 97 % | BODY MASS INDEX: 35.99 KG/M2 | RESPIRATION RATE: 18 BRPM | TEMPERATURE: 98 F | DIASTOLIC BLOOD PRESSURE: 67 MMHG | WEIGHT: 210.8 LBS | SYSTOLIC BLOOD PRESSURE: 118 MMHG | HEART RATE: 55 BPM

## 2020-11-04 PROCEDURE — 94761 N-INVAS EAR/PLS OXIMETRY MLT: CPT

## 2020-11-04 PROCEDURE — 2580000003 HC RX 258: Performed by: SURGERY

## 2020-11-04 PROCEDURE — 6370000000 HC RX 637 (ALT 250 FOR IP): Performed by: SURGERY

## 2020-11-04 RX ORDER — SPIRONOLACTONE 25 MG/1
12.5 TABLET ORAL DAILY
Qty: 30 TABLET | Refills: 3 | Status: ON HOLD | OUTPATIENT
Start: 2020-11-05 | End: 2021-07-16 | Stop reason: SDUPTHER

## 2020-11-04 RX ADMIN — IBUPROFEN 400 MG: 200 TABLET, FILM COATED ORAL at 06:29

## 2020-11-04 RX ADMIN — SPIRONOLACTONE 12.5 MG: 25 TABLET, FILM COATED ORAL at 08:22

## 2020-11-04 RX ADMIN — TAMSULOSIN HYDROCHLORIDE 0.4 MG: 0.4 CAPSULE ORAL at 08:22

## 2020-11-04 RX ADMIN — FUROSEMIDE 20 MG: 20 TABLET ORAL at 08:22

## 2020-11-04 RX ADMIN — CLOPIDOGREL BISULFATE 75 MG: 75 TABLET ORAL at 08:22

## 2020-11-04 RX ADMIN — Medication 10 ML: at 08:23

## 2020-11-04 ASSESSMENT — PAIN SCALES - GENERAL
PAINLEVEL_OUTOF10: 0
PAINLEVEL_OUTOF10: 5
PAINLEVEL_OUTOF10: 4

## 2020-11-04 ASSESSMENT — PAIN DESCRIPTION - LOCATION: LOCATION: OTHER (COMMENT)

## 2020-11-04 ASSESSMENT — PAIN DESCRIPTION - ORIENTATION: ORIENTATION: RIGHT

## 2020-11-04 ASSESSMENT — PAIN DESCRIPTION - PAIN TYPE: TYPE: ACUTE PAIN

## 2020-11-04 NOTE — PROGRESS NOTES
Pts daughter here to take patient home. Discharge instructions given to them both. Verbalizes understanding of all instructions including medications, diet, activity, wound care, follow up appointments, and reasons to call the doctor or return to the ED. IV discontinued with catheter intact, band aid applied. Pt dresses himself. Home medications returned to patient. Pt assisted into wheelchair and taken to private car to be discharged home with family.

## 2020-11-04 NOTE — PROGRESS NOTES
Subjective: Patient finishing up lunch at the bedside table. He denies chest pains and denies worsening shortness of breath. He specifically reports no problems with his unna boots overnight. Objective: chest clear to ascultation no rales no rhonchi card rrr no murmurs carotids no bruits. feets soft. Heels non tender. Weight  210 ideal dry body weight at this point. Assessment: dependent oedema lower legs, cellulitis, skin breakdown chronic kidney disease . Low cardia output       Plan: We discuss follow up. We discuss  Care of his legs they must remain dry. Discharge to outpatient follow up.     Godfrey Arambula MD

## 2020-11-04 NOTE — PROGRESS NOTES
Acknowledge pt discharge to home with daughter today and outpatient follow up with Dr Yudelka Vega for wound care. SW spoke with dtr earlier this week and she was in agreement as was pt when SW discussed with him. No other concerns expressed.  Rony JIN 11/4/2020

## 2020-11-05 NOTE — DISCHARGE SUMMARY
[place] [address]                               DISCHARGE SUMMARY    PATIENT NAME: Raghu Maguire                   :        1930  MED REC NO:   [id number]                              ROOM:       6136  ACCOUNT NO:   [de-identified]                           ADMIT DATE: 10/28/2020  PROVIDER:     [facility] DATE: 10/31/2020      ADMITTING DIAGNOSIS:  Cellulitis of both legs, L03.90. FINAL DISCHARGE DIAGNOSIS:  Cellulitis of both legs, L03.90. SECONDARY DIAGNOSES AND COMPLICATIONS:  Peripheral vascular  atherosclerosis, status post right trifurcation angioplasty and stent  placement; prostatic hypertrophy with obstruction of the lower urinary  tract and outlet obstruction, status post GreenLight surgery; arthritis;  degenerative osteoarthritis; dependent edema; right heart failure;  pulmonary hypertension; hyperlipidemia; emphysema; psoriasis; and past  history of essential hypertension. PREVIOUS SURGERIES:  Angioplasty of the right leg by Dr. Bianka Jin of  [facility], hernia repair, nasal polyp surgery, tonsillectomy,  transurethral resection. CONSULTATIONS:  Inpatient consult to cardiology, inpatient consult to  podiatry for nail care. Cultures of the legs returned gram-negative bacteria and predominant  Staph aureus. SUMMARY OF THE HOSPITALIZATION:  This 72-year-old white male presents on  an outpatient basis with failure to manage progressive edema of his  lower extremities despite diuresis. The patient is becoming symptomatic  from preload reduction; this is a recent change in this patient's  clinical course. His legs are massively swollen. The skin is broken  down over the legs and he is admitted to the hospital.  He has cultures  taken of the legs and the patient has parenteral antibiotics started for  coverage.   He has intensive care of the legs rendered with 4 times a day  wound care to the legs and the patient is suspected that he is suffering  from pulmonary hypertension and further diuresis will be gently  approached so as to not cause the patient to have a cardiac output  crisis. Consultation is obtained from cardiology, Dr. Abel Castillo, who sees  the patient and renders an opinion after cardiac echo studies confirm  pulmonary hypertension. Continuance of his intensive care to his legs  results in edema being brought under control. 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 Cardiology instructions are followed regarding  re-stratification of his hypertensive management and his fluid status. The patient is still in need of further care, and he is transferred to  the swing bed program on 10/31/2020 to complete his convalescence and  wound care.         CONOR Hamm    D: 11/04/2020 12:32:13       T: 11/04/2020 12:39:07     [email or web address]  Job#: [id number]     Doc#: [id number]    CC:

## 2020-11-06 NOTE — DISCHARGE SUMMARY
[place] [address]                               DISCHARGE SUMMARY    PATIENT NAME: Hilda Jones                   :        1930  MED REC NO:   [id number]                              ROOM:       0061  ACCOUNT NO:   [de-identified]                           ADMIT DATE: 10/31/2020  PROVIDER:     [facility] [place] DATE: 2020      DATE OF ADMISSION TO SWING BED:  10/31/2020    DATE OF DISCHARGE FROM SWING BED:  2020    ADMITTING DIAGNOSIS:  Cellulitis of the lower legs. FINAL DISCHARGE DIAGNOSIS:  Cellulitis of the lower legs, L03.90. SECONDARY DIAGNOSES AND COMPLICATIONS:  Dependent edema, chronic venous  stasis disease, lower extremities; status post peripheral arterial  angioplasty, right lower leg; COPD, has emphysema; hyperlipidemia;  hypertension; psoriasis; right heart failure; pulmonary hypertension. PAST SURGICAL HISTORY:  Angioplasty to the lower extremity, nasal polyp  surgery, bilateral tonsillectomy, transurethral resection. INPATIENT CONSULT:  Cardiology consult, Podiatry for koilonychia and  nail care. SUMMARY OF THE PATIENT'S HOSPITALIZATION:  This 80-year-old white male  presents with progressive failure of outpatient management in control of  dependent edema of the lower extremities. The legs become distended and  tense with breakdown of the skin, and he has cultures for Staph aureus,  and he is evaluated and found to have pulmonary hypertension. He has  emphysema and he has essential hypertension, but his hypertension is now  dependent on the adequacy of his pulmonary hypertension. In managing  his edema, if he is brought several pounds below his ideal body weight,  he has a fall off in his preload and cardiac output.   If his cardiac  output is improved with several pounds of fluid above his ideal dry body  weight of

## 2021-03-25 ENCOUNTER — APPOINTMENT (OUTPATIENT)
Dept: GENERAL RADIOLOGY | Age: 86
DRG: 603 | End: 2021-03-25
Payer: MEDICARE

## 2021-03-25 ENCOUNTER — HOSPITAL ENCOUNTER (INPATIENT)
Age: 86
LOS: 5 days | Discharge: HOME | DRG: 603 | End: 2021-03-30
Attending: EMERGENCY MEDICINE | Admitting: SURGERY
Payer: MEDICARE

## 2021-03-25 DIAGNOSIS — N17.9 ACUTE RENAL FAILURE, UNSPECIFIED ACUTE RENAL FAILURE TYPE (HCC): ICD-10-CM

## 2021-03-25 DIAGNOSIS — R33.9 URINARY RETENTION: ICD-10-CM

## 2021-03-25 DIAGNOSIS — R60.0 BILATERAL LOWER EXTREMITY EDEMA: Primary | ICD-10-CM

## 2021-03-25 DIAGNOSIS — L97.921 LOWER EXTREMITY ULCERATION, LEFT, LIMITED TO BREAKDOWN OF SKIN (HCC): ICD-10-CM

## 2021-03-25 PROBLEM — I15.0: Status: ACTIVE | Noted: 2021-03-25

## 2021-03-25 LAB
ABSOLUTE EOS #: 0.2 K/UL (ref 0–0.4)
ABSOLUTE IMMATURE GRANULOCYTE: ABNORMAL K/UL (ref 0–0.3)
ABSOLUTE LYMPH #: 1.2 K/UL (ref 1–4.8)
ABSOLUTE MONO #: 0.8 K/UL (ref 0–1)
ALBUMIN SERPL-MCNC: 4.1 G/DL (ref 3.5–5.2)
ALBUMIN/GLOBULIN RATIO: ABNORMAL (ref 1–2.5)
ALP BLD-CCNC: 83 U/L (ref 40–129)
ALT SERPL-CCNC: 15 U/L (ref 5–41)
ANION GAP SERPL CALCULATED.3IONS-SCNC: 10 MMOL/L (ref 9–17)
AST SERPL-CCNC: 23 U/L
BASOPHILS # BLD: 0 % (ref 0–2)
BASOPHILS ABSOLUTE: 0 K/UL (ref 0–0.2)
BILIRUB SERPL-MCNC: 0.86 MG/DL (ref 0.3–1.2)
BILIRUBIN URINE: NEGATIVE
BNP INTERPRETATION: NORMAL
BUN BLDV-MCNC: 60 MG/DL (ref 8–23)
BUN/CREAT BLD: 29 (ref 9–20)
CALCIUM SERPL-MCNC: 10.5 MG/DL (ref 8.6–10.4)
CHLORIDE BLD-SCNC: 100 MMOL/L (ref 98–107)
CO2: 30 MMOL/L (ref 20–31)
COLOR: YELLOW
COMMENT UA: NORMAL
CREAT SERPL-MCNC: 2.08 MG/DL (ref 0.7–1.2)
DIFFERENTIAL TYPE: YES
EOSINOPHILS RELATIVE PERCENT: 2 % (ref 0–5)
GFR AFRICAN AMERICAN: 37 ML/MIN
GFR NON-AFRICAN AMERICAN: 30 ML/MIN
GFR SERPL CREATININE-BSD FRML MDRD: ABNORMAL ML/MIN/{1.73_M2}
GFR SERPL CREATININE-BSD FRML MDRD: ABNORMAL ML/MIN/{1.73_M2}
GLUCOSE BLD-MCNC: 108 MG/DL (ref 70–99)
GLUCOSE URINE: NEGATIVE
HCT VFR BLD CALC: 41 % (ref 41–53)
HEMOGLOBIN: 13.8 G/DL (ref 13.5–17.5)
IMMATURE GRANULOCYTES: ABNORMAL %
KETONES, URINE: NEGATIVE
LEUKOCYTE ESTERASE, URINE: NEGATIVE
LYMPHOCYTES # BLD: 14 % (ref 13–44)
MCH RBC QN AUTO: 30.3 PG (ref 26–34)
MCHC RBC AUTO-ENTMCNC: 33.6 G/DL (ref 31–37)
MCV RBC AUTO: 90.1 FL (ref 80–100)
MONOCYTES # BLD: 9 % (ref 5–9)
NITRITE, URINE: NEGATIVE
NRBC AUTOMATED: ABNORMAL PER 100 WBC
PDW BLD-RTO: 14.8 % (ref 12.1–15.2)
PH UA: 5 (ref 5–8)
PHOSPHORUS: 4.2 MG/DL (ref 2.5–4.5)
PLATELET # BLD: 202 K/UL (ref 140–450)
PLATELET ESTIMATE: ABNORMAL
PMV BLD AUTO: ABNORMAL FL (ref 6–12)
POTASSIUM SERPL-SCNC: 4 MMOL/L (ref 3.7–5.3)
PRO-BNP: 231 PG/ML
PROTEIN UA: NEGATIVE
RBC # BLD: 4.55 M/UL (ref 4.5–5.9)
RBC # BLD: ABNORMAL 10*6/UL
SARS-COV-2, RAPID: NOT DETECTED
SEG NEUTROPHILS: 75 % (ref 39–75)
SEGMENTED NEUTROPHILS ABSOLUTE COUNT: 6.7 K/UL (ref 2.1–6.5)
SODIUM BLD-SCNC: 140 MMOL/L (ref 135–144)
SPECIFIC GRAVITY UA: 1.01 (ref 1–1.03)
SPECIMEN DESCRIPTION: NORMAL
TOTAL PROTEIN: 7.3 G/DL (ref 6.4–8.3)
TURBIDITY: CLEAR
URINE HGB: NEGATIVE
UROBILINOGEN, URINE: NORMAL
WBC # BLD: 8.8 K/UL (ref 3.5–11)
WBC # BLD: ABNORMAL 10*3/UL

## 2021-03-25 PROCEDURE — 80053 COMPREHEN METABOLIC PANEL: CPT

## 2021-03-25 PROCEDURE — C9803 HOPD COVID-19 SPEC COLLECT: HCPCS

## 2021-03-25 PROCEDURE — 81003 URINALYSIS AUTO W/O SCOPE: CPT

## 2021-03-25 PROCEDURE — 84100 ASSAY OF PHOSPHORUS: CPT

## 2021-03-25 PROCEDURE — 96374 THER/PROPH/DIAG INJ IV PUSH: CPT

## 2021-03-25 PROCEDURE — 51702 INSERT TEMP BLADDER CATH: CPT

## 2021-03-25 PROCEDURE — 87077 CULTURE AEROBIC IDENTIFY: CPT

## 2021-03-25 PROCEDURE — 1200000000 HC SEMI PRIVATE

## 2021-03-25 PROCEDURE — 85025 COMPLETE CBC W/AUTO DIFF WBC: CPT

## 2021-03-25 PROCEDURE — 6360000002 HC RX W HCPCS: Performed by: EMERGENCY MEDICINE

## 2021-03-25 PROCEDURE — 71045 X-RAY EXAM CHEST 1 VIEW: CPT

## 2021-03-25 PROCEDURE — 87205 SMEAR GRAM STAIN: CPT

## 2021-03-25 PROCEDURE — 87635 SARS-COV-2 COVID-19 AMP PRB: CPT

## 2021-03-25 PROCEDURE — 93005 ELECTROCARDIOGRAM TRACING: CPT | Performed by: SURGERY

## 2021-03-25 PROCEDURE — 99284 EMERGENCY DEPT VISIT MOD MDM: CPT

## 2021-03-25 PROCEDURE — 83880 ASSAY OF NATRIURETIC PEPTIDE: CPT

## 2021-03-25 PROCEDURE — 87186 SC STD MICRODIL/AGAR DIL: CPT

## 2021-03-25 PROCEDURE — 87070 CULTURE OTHR SPECIMN AEROBIC: CPT

## 2021-03-25 RX ORDER — TRAMADOL HYDROCHLORIDE 50 MG/1
50 TABLET ORAL EVERY 6 HOURS PRN
Status: DISCONTINUED | OUTPATIENT
Start: 2021-03-25 | End: 2021-03-30 | Stop reason: HOSPADM

## 2021-03-25 RX ORDER — POTASSIUM CHLORIDE 750 MG/1
10 TABLET, FILM COATED, EXTENDED RELEASE ORAL ONCE
Status: COMPLETED | OUTPATIENT
Start: 2021-03-25 | End: 2021-03-26

## 2021-03-25 RX ORDER — ALBUTEROL SULFATE 90 UG/1
2 AEROSOL, METERED RESPIRATORY (INHALATION) EVERY 6 HOURS PRN
Status: DISCONTINUED | OUTPATIENT
Start: 2021-03-25 | End: 2021-03-30 | Stop reason: HOSPADM

## 2021-03-25 RX ORDER — SODIUM CHLORIDE, SODIUM LACTATE, POTASSIUM CHLORIDE, CALCIUM CHLORIDE 600; 310; 30; 20 MG/100ML; MG/100ML; MG/100ML; MG/100ML
INJECTION, SOLUTION INTRAVENOUS CONTINUOUS
Status: DISCONTINUED | OUTPATIENT
Start: 2021-03-25 | End: 2021-03-25

## 2021-03-25 RX ORDER — CEPHALEXIN 250 MG/1
250 CAPSULE ORAL 4 TIMES DAILY
Status: ON HOLD | COMMUNITY
End: 2021-03-30 | Stop reason: HOSPADM

## 2021-03-25 RX ORDER — FUROSEMIDE 10 MG/ML
20 INJECTION INTRAMUSCULAR; INTRAVENOUS ONCE
Status: COMPLETED | OUTPATIENT
Start: 2021-03-25 | End: 2021-03-25

## 2021-03-25 RX ORDER — SODIUM CHLORIDE, SODIUM LACTATE, POTASSIUM CHLORIDE, CALCIUM CHLORIDE 600; 310; 30; 20 MG/100ML; MG/100ML; MG/100ML; MG/100ML
INJECTION, SOLUTION INTRAVENOUS CONTINUOUS
Status: DISCONTINUED | OUTPATIENT
Start: 2021-03-25 | End: 2021-03-30

## 2021-03-25 RX ADMIN — FUROSEMIDE 20 MG: 10 INJECTION, SOLUTION INTRAMUSCULAR; INTRAVENOUS at 19:38

## 2021-03-25 ASSESSMENT — PAIN DESCRIPTION - ORIENTATION: ORIENTATION: LEFT;RIGHT

## 2021-03-25 ASSESSMENT — PAIN DESCRIPTION - PAIN TYPE: TYPE: ACUTE PAIN;CHRONIC PAIN

## 2021-03-25 ASSESSMENT — PAIN SCALES - GENERAL: PAINLEVEL_OUTOF10: 10

## 2021-03-25 NOTE — ED PROVIDER NOTES
eMERGENCY dEPARTMENT eNCOUnter        [address] [facility]    Chief Complaint   Patient presents with    Leg Pain     Pt has increased pain and swelling to both legs since yesterday. Pt PCP is Ramana. HPI     Edith Dykes is a 80 y.o. male who presents to ED from home. By car. With complaint of low extremity edema. Onset since yesterday. Intensity of symptoms moderate. Location of symptoms both legs. Patient has been having increased pain and swelling of both legs since yesterday. Patient has chronic bilateral lower extremity edema. The edema has been controlled with pressure and dressings. Patient states that he has been getting up to go to the bathroom frequently and has not been able to keep his legs elevated. Patient started taking Keflex.   Patient denies shortness of breath more than usual.  REVIEW OF SYSTEMS    All systems reviewed and positives are in the HPI      PAST MEDICAL HISTORY    Past Medical History:   Diagnosis Date    Arthritis     COPD (chronic obstructive pulmonary disease) ([facility] [address]     Dependent edema     Hyperlipidemia     Hypertension     Psoriasis        SURGICAL HISTORY    Past Surgical History:   Procedure Laterality Date    ANGIOPLASTY Left 06/20/2018    [facility] - Dr. Robbin Navarro -- leg    HERNIA REPAIR      NASAL POLYP SURGERY Bilateral     TONSILLECTOMY      TURP N/A 4/4/2019    CYSTOSCOPY TRANSURETHRAL RESECTION -PROSTATE LASER- PVP GREENLIGHT performed by Le Rodriguez MD at [facility]    Current Outpatient Rx   Medication Sig Dispense Refill    cephALEXin (KEFLEX) 250 MG capsule Take 250 mg by mouth 4 times daily      spironolactone (ALDACTONE) 25 MG tablet Take 0.5 tablets by mouth daily 30 tablet 3    furosemide (LASIX) 20 MG tablet Take 20 mg by mouth daily      POTASSIUM CITRATE PO Take 99 mg by mouth daily      tamsulosin (FLOMAX) 0.4 MG capsule Take 1 capsule by mouth daily 30 capsule 2    clopidogrel (PLAVIX) 75 MG tablet Take 75 mg by mouth daily       hydrOXYzine (VISTARIL) 50 MG capsule Take 50 mg by mouth every 6 hours as needed       B Complex-Biotin-FA (B COMPLETE PO) Take 1 tablet by mouth daily       vitamin C (ASCORBIC ACID) 500 MG tablet Take 500 mg by mouth daily      vitamin D (CHOLECALCIFEROL) 1000 UNIT TABS tablet Take 1,000 Units by mouth daily      vitamin E 1000 units capsule Take 1,000 Units by mouth daily      Coenzyme Q10 (CO Q 10 PO) Take 1 tablet by mouth daily       Magnesium Citrate 100 MG TABS Take 100 mg by mouth daily      atorvastatin (LIPITOR) 40 MG tablet Take 40 mg by mouth nightly      melatonin 5 MG TABS tablet Take 5 mg by mouth daily as needed      BOSWELLIA HUGH PO Take 250 mg by mouth daily      Homeopathic Products (HOMEOPATHIC CALM SL) Take 1 tablet by mouth daily      ibuprofen (ADVIL;MOTRIN) 200 MG tablet Take 400 mg by mouth every 6 hours as needed for Pain      Albuterol Sulfate (PROAIR HFA IN) Inhale into the lungs 2 puffs every 6 hours as needed. ALLERGIES    No Known Allergies    FAMILY HISTORY    History reviewed. No pertinent family history. SOCIAL HISTORY    Social History     Socioeconomic History    Marital status:       Spouse name: None    Number of children: None    Years of education: None    Highest education level: None   Occupational History    None   Social Needs    Financial resource strain: None    Food insecurity     Worry: None     Inability: None    Transportation needs     Medical: None     Non-medical: None   Tobacco Use    Smoking status: Current Every Day Smoker     Packs/day: 2.00     Years: 70.00     Pack years: 140.00     Types: Pipe    Smokeless tobacco: Never Used    Tobacco comment: declines couseling    Substance and Sexual Activity    Alcohol use: No    Drug use: No    Sexual activity: None   Lifestyle    Physical activity     Days per week: None     Minutes per session: None    Stress: None   Relationships    Social connections     Talks on phone: None     Gets together: None     Attends Scientology service: None     Active member of club or organization: None     Attends meetings of clubs or organizations: None     Relationship status: None    Intimate partner violence     Fear of current or ex partner: None     Emotionally abused: None     Physically abused: None     Forced sexual activity: None   Other Topics Concern    None   Social History Narrative    None       PHYSICAL EXAM    VITAL SIGNS: BP (!) 129/56   Pulse 74   Temp 98.6 °F (37 °C) (Oral)   Resp 18   Ht 5' 4\" (1.626 m)   Wt 215 lb (97.5 kg)   SpO2 100%   BMI 36.90 kg/m²   Constitutional: Elderly male with bilateral lower  HENT:  Atraumatic, external ears normal, nose normal, oropharynx moist.  Neck- normal range of motion, no tenderness, supple   Respiratory:  No respiratory distress, normal breath sounds. Cardiovascular:  Normal rate, normal rhythm, no murmurs, no gallops, no rubs   GI:  Soft, nondistended, normal bowel sounds, nontender   Musculoskeletal: Lateral lower extremity edema with left lower extremity hemorrhagic blisters   integument: Bilateral lower extremity edema with chronic venous static changes. Blisters of the left lower extremity neurologic: Patient is alert and oriented x3. No focal deficits. RADIOLOGY/PROCEDURES    No orders to display   Beltran catheter, 16 Japanese was placed in ED. Immediate return of 600 cc of clear urine.       Labs  Labs Reviewed   COMPREHENSIVE METABOLIC PANEL - Abnormal; Notable for the following components:       Result Value    Glucose 108 (*)     BUN 60 (*)     CREATININE 2.08 (*)     Bun/Cre Ratio 29 (*)     Calcium 10.5 (*)     GFR Non- 30 (*)     GFR  37 (*)     All other components within normal limits   CBC WITH AUTO DIFFERENTIAL - Abnormal; Notable for the following components:    Segs Absolute 6.70 (*)     All other components within normal limits   CULTURE, WOUND   URINALYSIS   BRAIN NATRIURETIC PEPTIDE             Summation      Patient Course: Patient is discussed with Dr. Ileana Ortiz. Patient will be admitted for further evaluation and treatment. ED Medications administered this visit:    Medications   furosemide (LASIX) injection 20 mg (20 mg Intravenous Given 3/25/21 1938)       New Prescriptions from this visit:    New Prescriptions    No medications on file       Follow-up:  No follow-up provider specified. Final Impression:   1. Bilateral lower extremity edema    2. Lower extremity ulceration, left, limited to breakdown of skin ([address]    3. Urinary retention    4.  Acute renal failure, unspecified acute renal failure type [facility])               (Please note that portions of this note were completed with a voice recognition program.  Efforts were made to edit the dictations but occasionally words are mis-transcribed.)        Bonnie Marie MD  03/25/21 2057

## 2021-03-26 PROBLEM — E43 SEVERE MALNUTRITION (HCC): Status: ACTIVE | Noted: 2021-03-26

## 2021-03-26 LAB
ANION GAP SERPL CALCULATED.3IONS-SCNC: 5 MMOL/L (ref 9–17)
BUN BLDV-MCNC: 49 MG/DL (ref 8–23)
BUN/CREAT BLD: 32 (ref 9–20)
CALCIUM SERPL-MCNC: 9.6 MG/DL (ref 8.6–10.4)
CHLORIDE BLD-SCNC: 103 MMOL/L (ref 98–107)
CO2: 33 MMOL/L (ref 20–31)
CREAT SERPL-MCNC: 1.55 MG/DL (ref 0.7–1.2)
GFR AFRICAN AMERICAN: 51 ML/MIN
GFR NON-AFRICAN AMERICAN: 42 ML/MIN
GFR SERPL CREATININE-BSD FRML MDRD: ABNORMAL ML/MIN/{1.73_M2}
GFR SERPL CREATININE-BSD FRML MDRD: ABNORMAL ML/MIN/{1.73_M2}
GLUCOSE BLD-MCNC: 98 MG/DL (ref 70–99)
POTASSIUM SERPL-SCNC: 3.9 MMOL/L (ref 3.7–5.3)
SODIUM BLD-SCNC: 141 MMOL/L (ref 135–144)

## 2021-03-26 PROCEDURE — 80048 BASIC METABOLIC PNL TOTAL CA: CPT

## 2021-03-26 PROCEDURE — 1200000000 HC SEMI PRIVATE

## 2021-03-26 PROCEDURE — 6370000000 HC RX 637 (ALT 250 FOR IP): Performed by: SURGERY

## 2021-03-26 PROCEDURE — 6360000002 HC RX W HCPCS: Performed by: SURGERY

## 2021-03-26 PROCEDURE — 36415 COLL VENOUS BLD VENIPUNCTURE: CPT

## 2021-03-26 PROCEDURE — 94640 AIRWAY INHALATION TREATMENT: CPT

## 2021-03-26 PROCEDURE — 2580000003 HC RX 258: Performed by: SURGERY

## 2021-03-26 PROCEDURE — 94664 DEMO&/EVAL PT USE INHALER: CPT

## 2021-03-26 RX ORDER — ATORVASTATIN CALCIUM 40 MG/1
40 TABLET, FILM COATED ORAL NIGHTLY
Status: DISCONTINUED | OUTPATIENT
Start: 2021-03-26 | End: 2021-03-30 | Stop reason: HOSPADM

## 2021-03-26 RX ORDER — SPIRONOLACTONE 25 MG/1
25 TABLET ORAL DAILY
Status: DISCONTINUED | OUTPATIENT
Start: 2021-03-26 | End: 2021-03-30 | Stop reason: HOSPADM

## 2021-03-26 RX ORDER — HYDROXYZINE HYDROCHLORIDE 25 MG/ML
25 INJECTION, SOLUTION INTRAMUSCULAR EVERY 6 HOURS PRN
Status: DISCONTINUED | OUTPATIENT
Start: 2021-03-26 | End: 2021-03-27

## 2021-03-26 RX ORDER — CLOPIDOGREL BISULFATE 75 MG/1
75 TABLET ORAL DAILY
Status: DISCONTINUED | OUTPATIENT
Start: 2021-03-26 | End: 2021-03-30 | Stop reason: HOSPADM

## 2021-03-26 RX ORDER — PANTOPRAZOLE SODIUM 40 MG/1
40 TABLET, DELAYED RELEASE ORAL
Status: DISCONTINUED | OUTPATIENT
Start: 2021-03-27 | End: 2021-03-30 | Stop reason: HOSPADM

## 2021-03-26 RX ORDER — LANOLIN ALCOHOL/MO/W.PET/CERES
6 CREAM (GRAM) TOPICAL NIGHTLY PRN
Status: DISCONTINUED | OUTPATIENT
Start: 2021-03-26 | End: 2021-03-30 | Stop reason: HOSPADM

## 2021-03-26 RX ORDER — TAMSULOSIN HYDROCHLORIDE 0.4 MG/1
0.4 CAPSULE ORAL DAILY
Status: DISCONTINUED | OUTPATIENT
Start: 2021-03-26 | End: 2021-03-27

## 2021-03-26 RX ADMIN — ALBUTEROL SULFATE 2 PUFF: 90 AEROSOL, METERED RESPIRATORY (INHALATION) at 10:50

## 2021-03-26 RX ADMIN — TRAMADOL HYDROCHLORIDE 50 MG: 50 TABLET, FILM COATED ORAL at 10:37

## 2021-03-26 RX ADMIN — TAMSULOSIN HYDROCHLORIDE 0.4 MG: 0.4 CAPSULE ORAL at 13:52

## 2021-03-26 RX ADMIN — CEFOXITIN SODIUM 1000 MG: 1 POWDER, FOR SOLUTION INTRAVENOUS at 10:37

## 2021-03-26 RX ADMIN — POTASSIUM CHLORIDE 10 MEQ: 750 TABLET, FILM COATED, EXTENDED RELEASE ORAL at 00:26

## 2021-03-26 RX ADMIN — TRAMADOL HYDROCHLORIDE 50 MG: 50 TABLET, FILM COATED ORAL at 15:45

## 2021-03-26 RX ADMIN — HYDROXYZINE HYDROCHLORIDE 25 MG: 25 INJECTION, SOLUTION INTRAMUSCULAR at 20:30

## 2021-03-26 RX ADMIN — ATORVASTATIN CALCIUM 40 MG: 40 TABLET, FILM COATED ORAL at 20:27

## 2021-03-26 RX ADMIN — CEFOXITIN SODIUM 1000 MG: 1 POWDER, FOR SOLUTION INTRAVENOUS at 20:30

## 2021-03-26 RX ADMIN — SODIUM CHLORIDE, POTASSIUM CHLORIDE, SODIUM LACTATE AND CALCIUM CHLORIDE: 600; 310; 30; 20 INJECTION, SOLUTION INTRAVENOUS at 00:25

## 2021-03-26 RX ADMIN — TRAMADOL HYDROCHLORIDE 50 MG: 50 TABLET, FILM COATED ORAL at 03:39

## 2021-03-26 RX ADMIN — SPIRONOLACTONE 25 MG: 25 TABLET, FILM COATED ORAL at 13:52

## 2021-03-26 RX ADMIN — CLOPIDOGREL BISULFATE 75 MG: 75 TABLET ORAL at 13:52

## 2021-03-26 RX ADMIN — ALBUTEROL SULFATE 2 PUFF: 90 AEROSOL, METERED RESPIRATORY (INHALATION) at 21:33

## 2021-03-26 RX ADMIN — CEFOXITIN SODIUM 1000 MG: 1 POWDER, FOR SOLUTION INTRAVENOUS at 00:10

## 2021-03-26 RX ADMIN — Medication 210 MG: at 20:44

## 2021-03-26 RX ADMIN — MELATONIN 6 MG: at 20:33

## 2021-03-26 RX ADMIN — Medication 99 MG: at 20:27

## 2021-03-26 ASSESSMENT — PAIN SCALES - GENERAL
PAINLEVEL_OUTOF10: 8
PAINLEVEL_OUTOF10: 7
PAINLEVEL_OUTOF10: 5
PAINLEVEL_OUTOF10: 5

## 2021-03-26 ASSESSMENT — PAIN DESCRIPTION - LOCATION
LOCATION: LEG

## 2021-03-26 ASSESSMENT — PAIN DESCRIPTION - ORIENTATION
ORIENTATION: RIGHT;LEFT
ORIENTATION: RIGHT;LEFT

## 2021-03-26 ASSESSMENT — PAIN DESCRIPTION - PAIN TYPE
TYPE: CHRONIC PAIN
TYPE: CHRONIC PAIN

## 2021-03-26 NOTE — PROGRESS NOTES
16 Fr urinary catheter inserted. Yellow, clear urine draining. 575 mL UOP. UA collected. Pt tolerates procedure well.

## 2021-03-26 NOTE — PROGRESS NOTES
Quality flow rounds held on 3/26/21     Porfirio Dunne is admitted for ARF, cellulitis  Length of stay 1. Education:    Needed Education: disease process, meds, follow up,diet      Do you have any questions regarding your plan of care while at the hospital? denies    Planned Disposition:               [x]  Home when able                [] Swing Bed                [] ECF/SNF               [] Other/TBD    Barriers to Discharge:    Can you afford your medications? yes   Do you have transportation to follow up appointments? Daughter drives   Do you need any new equipment at home? none   Current equipment includes   walker, grab bars, shower chair,hospital bed    Do you have a living will or durable power of  for healthcare? yes               If yes do we have a copy on file? Yes     Do you or your family have any questions or concerns we haven't already discussed? Denies    Lives with his daughter , denies needs at discharge. Plans to return home after discharge. Eagle Nguyễn and writer present for rounding.

## 2021-03-26 NOTE — PLAN OF CARE
Problem: Safety:  Goal: Free from intentional harm  Description: Free from intentional harm  Outcome: Met This Shift     Problem: Skin Integrity:  Goal: Will show no infection signs and symptoms  Description: Will show no infection signs and symptoms  Outcome: Ongoing  Goal: Absence of new skin breakdown  Description: Absence of new skin breakdown  Outcome: Ongoing     Problem: Infection:  Goal: Will remain free from infection  Description: Will remain free from infection  Outcome: Ongoing     Problem: Safety:  Goal: Free from accidental physical injury  Description: Free from accidental physical injury  Outcome: Ongoing  Goal: Ability to remain free from injury will improve  Description: Ability to remain free from injury will improve  Outcome: Ongoing  Goal: Ability to correctly utilize injury-preventing devices as appropriate will improve  Description: Ability to correctly utilize injury-preventing devices as appropriate will improve  Outcome: Ongoing     Problem: Urinary Elimination:  Goal: Will remain free from infection  Description: Will remain free from infection  Outcome: Ongoing     Problem: Physical Regulation:  Goal: Will remain free from infection  Description: Will remain free from infection  Outcome: Ongoing

## 2021-03-26 NOTE — PROGRESS NOTES
Met with Patient during quality flow rounds this a.m. Patient is a 80year old, , white male, admitted with a diagnosis of Renal Failure Associated with Renal Vascular Disease. Patient is alert and oriented, pleasant and cooperative with this assessment. States that he wishes to return home with family when stable. Patient resides with his daughter in [place]. He uses a hospital bed, grab bars and has a walker to use at home for assistance as needed. Patient currently has no outside services in place. Daughter manages his medications and provides for his transportation needs. PCP is Dr. Garen Prader. Patient has insurance and can afford his medications per his own report. Patient plans to return home with daughter when able. He remains a 'Full Code' and does have medical directives in place. LSW to monitor for further discharge planning needs and assist as needs arise.     Miguel. Karen [facility] Center[facility]  3/26/2021

## 2021-03-26 NOTE — PROGRESS NOTES
Dot #1      Subjective: Patient denies shortness of breath , and denies chest pains. His legs continue to be painful even to light touch. His appetite is good and he wishes to take his Magnesium and Potassium supplements from home while in the hospital.  He denies diaphoresis and denies rigors. Objective: Chest clear to ascultation  No rales no rhonchi card rrr abdomen soft normal bowel sounds no peritoneal signs. Breakfast tray at beside all food consumed. Wound care to left leg. Camera nor available for photodocumentation today. Left leg undressed to skin level. Cellulitis circumferentially tibial tuberosity to below malleoli. Oedema is slightly reduced. Triangle of tissue present with puss beneath it. This is drained and trimmed. It measure 23 mm by  1 cm base. It is cleansed with peroxide and then after trimmed off base is painted with betadine then dressed with sterile gauze and redressed with ace wrap. Cellulitis right leg unchanged. Labs reviewed in detail. Creatinine bun has improved. Urine out put good. Afebrile with stable vs.  Iv side non cellulitis and functional.      Assessment: bilateral cellulitis lower legs post renal acute insufficiency superimposed on chronic renal insufficiency. Chronic venous stasis disease copd primary pulmonary hypertension cardiomegaly et. Al..    Plan: continue current regimen with modifications. All patients questions answered.     Rock Hays MD

## 2021-03-26 NOTE — PROGRESS NOTES
Comprehensive Nutrition Assessment    Type and Reason for Visit:  Initial    Nutrition Recommendations/Plan:  Encourage oral intakes    Nutrition Assessment:  Severe malnutrition per definition r/t inadequate nutrient intakes, AEB moderate fat and muscle losses in presence of obesity. Mild weight losses from known historical values, and more significant losses after being able to \"pee\". Taking PO well per inteview, able to chew despite lack of dentition. Renal indices improving, remaining over baseline values. I suspect meals here are of greater substance than at home, and therefore going to defer supplementation at this time. Malnutrition Assessment:  Malnutrition Status:  Severe malnutrition    Context:  Acute Illness     Findings of the 6 clinical characteristics of malnutrition:  Energy Intake:  No significant decrease in energy intake  Weight Loss:  No significant weight loss     Body Fat Loss:  7 - Moderate body fat loss Orbital, Buccal region   Muscle Mass Loss:  7 - Moderate muscle mass loss Temples (temporalis)  Fluid Accumulation:  7 - Moderate to Severe Extremities   Strength:  Not Performed    Estimated Daily Nutrient Needs:  Energy (kcal):  0624-7033(54-40); Weight Used for Energy Requirements:  Current     Protein (g):  77-88(1.3-1.5); Weight Used for Protein Requirements:  Ideal        Fluid (ml/day):  1700; Method Used for Fluid Requirements:  1 ml/kcal      Nutrition Related Findings:  moderate fat and muscle losses in temples, orbitals and buccal regions (despite obesity)      Wounds:  None       Current Nutrition Therapies:    DIET GENERAL;     Anthropometric Measures:  · Height: 5' 4\" (162.6 cm)  · Current Body Weight: 205 lb 9.6 oz (93.3 kg)   · Admission Body Weight: 215 lb (97.5 kg)(full of urine (couldn't pee))    · Usual Body Weight: 210 lb 12.8 oz (95.6 kg)(November)     · Ideal Body Weight: 130 lbs; % Ideal Body Weight 158.2 %   · BMI: 35.3  · Adjusted Body Weight:  ; No Adjustment · BMI Categories: Obese Class 2 (BMI 35.0 -39.9)       Nutrition Diagnosis:   · Severe malnutrition related to inadequate protein-energy intake as evidenced by moderate loss of subcutaneous fat, moderate muscle loss, weight loss    Lab Results   Component Value Date     03/26/2021    K 3.9 03/26/2021     03/26/2021    CO2 33 (H) 03/26/2021    BUN 49 (H) 03/26/2021    CREATININE 1.55 (H) 03/26/2021    GLUCOSE 98 03/26/2021    CALCIUM 9.6 03/26/2021    PROT 7.3 03/25/2021    LABALBU 4.1 03/25/2021    BILITOT 0.86 03/25/2021    ALKPHOS 83 03/25/2021    AST 23 03/25/2021    ALT 15 03/25/2021    LABGLOM 42 (L) 03/26/2021    GFRAA 51 (L) 03/26/2021    GLOB NOT REPORTED 10/29/2020     No results found for: LABA1C  No results found for: EAG  No results found for: VITD25    Nutrition Interventions:   Food and/or Nutrient Delivery:  Continue Current Diet  Nutrition Education/Counseling:  No recommendation at this time   Coordination of Nutrition Care:  Continue to monitor while inpatient    Goals:  PO >75% meals with adequate oral fluids       Nutrition Monitoring and Evaluation:   Behavioral-Environmental Outcomes:  None Identified   Food/Nutrient Intake Outcomes:  Food and Nutrient Intake  Physical Signs/Symptoms Outcomes:  Biochemical Data, Weight, Fluid Status or Edema     Discharge Planning:     Too soon to determine     Electronically signed by Pearl Cassidy RD, LD on 3/26/21 at 11:23 AM EDT    Contact: [phone]

## 2021-03-26 NOTE — H&P
History and Physical        Niall Noonan is a 80 y.o. male  YOB: 1930        Chief complaint: Both legs progressive swelling and pain with breakdown of skin of both legs. Despite increasing his diuretics and reinforcing instructions on elevation of his legs and using ace wraps his legs have continued to swell and now are erythematous with several blisters which have formed and burst draining blood fluid. The legs are hot to the touch and are very painful. The skin over his legs below the tibial tuberosities circumstantially is macerated and broken down. The legs are cellulitic and the diameter of his right calf is enlarged as is the left calf. His has pain in the ankles at rest.  He has a pmh of emergency stent placement for ischemic peripheral vascular disease below the trifurcation of the right lower leg two years ago. He has continued smoking. His pressure is running low and may represent volume reductions but also may represent a change in his cardiac output. He  was hypertensive several years ago. Cardiologist has seen him and studied his heart. He has pulmonary hypertension and his ideal range of hydration is being worked out with a close eye on bmp and creatinine. Family History  Diabetes No  Heart Disease Yes  Tuberculosis No  Cancer No  Other:         Family History: cardiovascular disease at an advanced age. Past History  Asthma No  Chronic Bronchitis Yes  Emphysema Yes  Tuberculosis No  Smokes Yes( pipe 70 years)  Head Injury No  Epilepsy No  Kidney Disease No  Liver Disease/Hepatitis No  Yellow Jaundice No  Diabetes No  Thyroid Disease No  Heart Disease Yes  High Blood Pressure Yes (previously)  Angina No  Rheumatic Fever No  Cortisone Rx No  Alcohol Excess No  Pregnancy No  Glaucoma No  Loose Teeth Yes and No  Tranquilizers No  Other: peripheral vascular disease.          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 Authorizing Provider   ibuprofen (ADVIL;MOTRIN) 200 MG tablet Take 400 mg by mouth every 6 hours as needed for Pain       Historical Provider, MD   magnesium 30 MG tablet Take 30 mg by mouth daily       Historical Provider, MD   POTASSIUM GLUCONATE PO Take by mouth       Historical Provider, MD   Albuterol Sulfate (PROAIR HFA IN) Inhale into the lungs 2 puffs every 6 hours as needed. Historical Provider, MD                    hydrOXYzine (VISTARIL) 50 MG capsule Take 50 mg by mouth 3 times daily as needed for Itching       Historical Provider, MD   furosemide (LASIX) 20 MG tablet Take 20 mg by mouth 2 times daily  With 3 pills odd days       Historical Provider, MD   tamsulosin (FLOMAX) 0.4 MG capsule Take 0.4 mg by mouth 2 times daily       Historical Provider, MD   lisinopril (PRINIVIL;ZESTRIL) 5 MG tablet Take 5 mg by mouth daily       Historical Provider, MD      ,   Current Hospital Medications Plavix   No current facility-administered medications for this encounter. Allergies: Patient has no known allergies. Previous Surgery: percutaneous cath with angioplasty left leg 6/18, t and a, herniorrhaphy         Physical Examination     Constitutional: well nourished      Neurological: oriented times three cn 2-12 intact  Conductive hearing deficit      Eyes: perrla eom intact conj pink non icteric      Lymphatic wnl      Neck/Ear/Nose/Throat: no carotid bruits no masses slight cervical kyphosis bilateral tympanic membranes visualized.      Respiratory: increased ap diameter chest slow expiration no rales no rhonchi      Cardiovascular: rrr mitral regurgitant murmur left sternal

## 2021-03-27 LAB
ANION GAP SERPL CALCULATED.3IONS-SCNC: 5 MMOL/L (ref 9–17)
BUN BLDV-MCNC: 40 MG/DL (ref 8–23)
BUN/CREAT BLD: 29 (ref 9–20)
CALCIUM SERPL-MCNC: 9.5 MG/DL (ref 8.6–10.4)
CHLORIDE BLD-SCNC: 104 MMOL/L (ref 98–107)
CO2: 31 MMOL/L (ref 20–31)
CREAT SERPL-MCNC: 1.36 MG/DL (ref 0.7–1.2)
GFR AFRICAN AMERICAN: 60 ML/MIN
GFR NON-AFRICAN AMERICAN: 49 ML/MIN
GFR SERPL CREATININE-BSD FRML MDRD: ABNORMAL ML/MIN/{1.73_M2}
GFR SERPL CREATININE-BSD FRML MDRD: ABNORMAL ML/MIN/{1.73_M2}
GLUCOSE BLD-MCNC: 114 MG/DL (ref 70–99)
POTASSIUM SERPL-SCNC: 4.1 MMOL/L (ref 3.7–5.3)
SODIUM BLD-SCNC: 140 MMOL/L (ref 135–144)

## 2021-03-27 PROCEDURE — 94640 AIRWAY INHALATION TREATMENT: CPT

## 2021-03-27 PROCEDURE — 36415 COLL VENOUS BLD VENIPUNCTURE: CPT

## 2021-03-27 PROCEDURE — 2580000003 HC RX 258: Performed by: SURGERY

## 2021-03-27 PROCEDURE — 1200000000 HC SEMI PRIVATE

## 2021-03-27 PROCEDURE — 6370000000 HC RX 637 (ALT 250 FOR IP)

## 2021-03-27 PROCEDURE — 6370000000 HC RX 637 (ALT 250 FOR IP): Performed by: SURGERY

## 2021-03-27 PROCEDURE — 6360000002 HC RX W HCPCS: Performed by: SURGERY

## 2021-03-27 PROCEDURE — 94761 N-INVAS EAR/PLS OXIMETRY MLT: CPT

## 2021-03-27 PROCEDURE — 80048 BASIC METABOLIC PNL TOTAL CA: CPT

## 2021-03-27 RX ORDER — TAMSULOSIN HYDROCHLORIDE 0.4 MG/1
0.4 CAPSULE ORAL 2 TIMES DAILY WITH MEALS
Status: DISCONTINUED | OUTPATIENT
Start: 2021-03-27 | End: 2021-03-30 | Stop reason: HOSPADM

## 2021-03-27 RX ORDER — HYDROXYZINE PAMOATE 25 MG/1
CAPSULE ORAL
Status: COMPLETED
Start: 2021-03-27 | End: 2021-03-27

## 2021-03-27 RX ORDER — HYDROXYZINE PAMOATE 25 MG/1
25 CAPSULE ORAL 4 TIMES DAILY PRN
Status: DISCONTINUED | OUTPATIENT
Start: 2021-03-27 | End: 2021-03-30 | Stop reason: HOSPADM

## 2021-03-27 RX ADMIN — HYDROXYZINE PAMOATE 25 MG: 25 CAPSULE ORAL at 20:41

## 2021-03-27 RX ADMIN — TRAMADOL HYDROCHLORIDE 50 MG: 50 TABLET, FILM COATED ORAL at 12:24

## 2021-03-27 RX ADMIN — CLOPIDOGREL BISULFATE 75 MG: 75 TABLET ORAL at 08:50

## 2021-03-27 RX ADMIN — TAMSULOSIN HYDROCHLORIDE 0.4 MG: 0.4 CAPSULE ORAL at 08:51

## 2021-03-27 RX ADMIN — HYDROXYZINE PAMOATE 25 MG: 25 CAPSULE ORAL at 12:26

## 2021-03-27 RX ADMIN — CEFOXITIN SODIUM 1000 MG: 1 POWDER, FOR SOLUTION INTRAVENOUS at 20:41

## 2021-03-27 RX ADMIN — Medication 99 MG: at 08:54

## 2021-03-27 RX ADMIN — TAMSULOSIN HYDROCHLORIDE 0.4 MG: 0.4 CAPSULE ORAL at 17:45

## 2021-03-27 RX ADMIN — SPIRONOLACTONE 25 MG: 25 TABLET, FILM COATED ORAL at 08:51

## 2021-03-27 RX ADMIN — CEFOXITIN SODIUM 1000 MG: 1 POWDER, FOR SOLUTION INTRAVENOUS at 08:50

## 2021-03-27 RX ADMIN — MELATONIN 6 MG: at 20:42

## 2021-03-27 RX ADMIN — Medication 210 MG: at 20:43

## 2021-03-27 RX ADMIN — TRAMADOL HYDROCHLORIDE 50 MG: 50 TABLET, FILM COATED ORAL at 00:22

## 2021-03-27 RX ADMIN — TRAMADOL HYDROCHLORIDE 50 MG: 50 TABLET, FILM COATED ORAL at 06:22

## 2021-03-27 RX ADMIN — ALBUTEROL SULFATE 2 PUFF: 90 AEROSOL, METERED RESPIRATORY (INHALATION) at 21:18

## 2021-03-27 RX ADMIN — ATORVASTATIN CALCIUM 40 MG: 40 TABLET, FILM COATED ORAL at 20:42

## 2021-03-27 RX ADMIN — TRAMADOL HYDROCHLORIDE 50 MG: 50 TABLET, FILM COATED ORAL at 20:42

## 2021-03-27 RX ADMIN — PANTOPRAZOLE SODIUM 40 MG: 40 TABLET, DELAYED RELEASE ORAL at 05:50

## 2021-03-27 RX ADMIN — SODIUM CHLORIDE, POTASSIUM CHLORIDE, SODIUM LACTATE AND CALCIUM CHLORIDE: 600; 310; 30; 20 INJECTION, SOLUTION INTRAVENOUS at 14:48

## 2021-03-27 RX ADMIN — Medication 99 MG: at 20:43

## 2021-03-27 RX ADMIN — ALBUTEROL SULFATE 2 PUFF: 90 AEROSOL, METERED RESPIRATORY (INHALATION) at 03:14

## 2021-03-27 RX ADMIN — ALBUTEROL SULFATE 2 PUFF: 90 AEROSOL, METERED RESPIRATORY (INHALATION) at 15:46

## 2021-03-27 RX ADMIN — Medication 99 MG: at 14:49

## 2021-03-27 RX ADMIN — ALBUTEROL SULFATE 2 PUFF: 90 AEROSOL, METERED RESPIRATORY (INHALATION) at 09:15

## 2021-03-27 RX ADMIN — HYDROXYZINE HYDROCHLORIDE 25 MG: 25 INJECTION, SOLUTION INTRAMUSCULAR at 06:22

## 2021-03-27 ASSESSMENT — PAIN DESCRIPTION - PAIN TYPE
TYPE: CHRONIC PAIN

## 2021-03-27 ASSESSMENT — PAIN DESCRIPTION - LOCATION: LOCATION: LEG

## 2021-03-27 ASSESSMENT — PAIN SCALES - GENERAL
PAINLEVEL_OUTOF10: 4
PAINLEVEL_OUTOF10: 5
PAINLEVEL_OUTOF10: 5
PAINLEVEL_OUTOF10: 6
PAINLEVEL_OUTOF10: 6
PAINLEVEL_OUTOF10: 4
PAINLEVEL_OUTOF10: 6
PAINLEVEL_OUTOF10: 6

## 2021-03-27 ASSESSMENT — PAIN DESCRIPTION - FREQUENCY
FREQUENCY: CONTINUOUS

## 2021-03-27 ASSESSMENT — PAIN DESCRIPTION - ORIENTATION: ORIENTATION: RIGHT;LEFT

## 2021-03-27 NOTE — PLAN OF CARE
Range of joint motion will improve  Outcome: Ongoing  Goal: Ability to ambulate will improve  Description: Ability to ambulate will improve  Outcome: Ongoing  Goal: Muscle strength will improve  Description: Muscle strength will improve  Outcome: Ongoing     Problem: Nutritional:  Goal: Ability to identify appropriate dietary choices will improve  Description: Ability to identify appropriate dietary choices will improve  Outcome: Ongoing  Goal: Dietary intake will improve  Description: Dietary intake will improve  Outcome: Ongoing  Goal: Nutritional status will improve  Description: Nutritional status will improve  Outcome: Ongoing     Problem: Physical Regulation:  Goal: Complications related to the disease process, condition or treatment will be avoided or minimized  Description: Complications related to the disease process, condition or treatment will be avoided or minimized  Outcome: Ongoing  Goal: Diagnostic test results will improve  Description: Diagnostic test results will improve  Outcome: Ongoing  Goal: Ability to maintain vital signs within normal range will improve  Description: Ability to maintain vital signs within normal range will improve  Outcome: Ongoing     Problem: Nutrition  Goal: Optimal nutrition therapy  3/26/2021 1124 by Christine Bell RD, LD  Outcome: Ongoing  Note: Nutrition Problem #1: Severe malnutrition  Intervention: Food and/or Nutrient Delivery: Continue Current Diet  Nutritional Goals: PO >75% meals with adequate oral fluids  Encourage oral intakes

## 2021-03-27 NOTE — PROGRESS NOTES
RN observes pt chewing large wad of gum. RN educates pt to limit sticks of gum chewed at once due to choking hazard. Pt states \"that's what I always do\". Will continue to monitor.

## 2021-03-28 ENCOUNTER — APPOINTMENT (OUTPATIENT)
Dept: GENERAL RADIOLOGY | Age: 86
DRG: 603 | End: 2021-03-28
Payer: MEDICARE

## 2021-03-28 LAB
ANION GAP SERPL CALCULATED.3IONS-SCNC: 6 MMOL/L (ref 9–17)
BUN BLDV-MCNC: 31 MG/DL (ref 8–23)
BUN/CREAT BLD: 24 (ref 9–20)
CALCIUM SERPL-MCNC: 10 MG/DL (ref 8.6–10.4)
CHLORIDE BLD-SCNC: 103 MMOL/L (ref 98–107)
CO2: 31 MMOL/L (ref 20–31)
CREAT SERPL-MCNC: 1.29 MG/DL (ref 0.7–1.2)
EKG ATRIAL RATE: 48 BPM
EKG P AXIS: 34 DEGREES
EKG P-R INTERVAL: 222 MS
EKG Q-T INTERVAL: 470 MS
EKG QRS DURATION: 108 MS
EKG QTC CALCULATION (BAZETT): 419 MS
EKG R AXIS: -36 DEGREES
EKG T AXIS: 8 DEGREES
EKG VENTRICULAR RATE: 48 BPM
GFR AFRICAN AMERICAN: >60 ML/MIN
GFR NON-AFRICAN AMERICAN: 52 ML/MIN
GFR SERPL CREATININE-BSD FRML MDRD: ABNORMAL ML/MIN/{1.73_M2}
GFR SERPL CREATININE-BSD FRML MDRD: ABNORMAL ML/MIN/{1.73_M2}
GLUCOSE BLD-MCNC: 104 MG/DL (ref 70–99)
POTASSIUM SERPL-SCNC: 4.2 MMOL/L (ref 3.7–5.3)
SODIUM BLD-SCNC: 140 MMOL/L (ref 135–144)

## 2021-03-28 PROCEDURE — 6370000000 HC RX 637 (ALT 250 FOR IP): Performed by: SURGERY

## 2021-03-28 PROCEDURE — 94640 AIRWAY INHALATION TREATMENT: CPT

## 2021-03-28 PROCEDURE — 36415 COLL VENOUS BLD VENIPUNCTURE: CPT

## 2021-03-28 PROCEDURE — 6360000002 HC RX W HCPCS: Performed by: SURGERY

## 2021-03-28 PROCEDURE — 93010 ELECTROCARDIOGRAM REPORT: CPT | Performed by: INTERNAL MEDICINE

## 2021-03-28 PROCEDURE — 80048 BASIC METABOLIC PNL TOTAL CA: CPT

## 2021-03-28 PROCEDURE — 1200000000 HC SEMI PRIVATE

## 2021-03-28 PROCEDURE — 94761 N-INVAS EAR/PLS OXIMETRY MLT: CPT

## 2021-03-28 PROCEDURE — 71046 X-RAY EXAM CHEST 2 VIEWS: CPT

## 2021-03-28 PROCEDURE — 2580000003 HC RX 258: Performed by: SURGERY

## 2021-03-28 RX ORDER — FUROSEMIDE 10 MG/ML
10 INJECTION INTRAMUSCULAR; INTRAVENOUS ONCE
Status: COMPLETED | OUTPATIENT
Start: 2021-03-28 | End: 2021-03-28

## 2021-03-28 RX ADMIN — MELATONIN 6 MG: at 20:46

## 2021-03-28 RX ADMIN — SPIRONOLACTONE 25 MG: 25 TABLET, FILM COATED ORAL at 08:56

## 2021-03-28 RX ADMIN — Medication 210 MG: at 20:48

## 2021-03-28 RX ADMIN — ATORVASTATIN CALCIUM 40 MG: 40 TABLET, FILM COATED ORAL at 20:46

## 2021-03-28 RX ADMIN — PANTOPRAZOLE SODIUM 40 MG: 40 TABLET, DELAYED RELEASE ORAL at 05:38

## 2021-03-28 RX ADMIN — HYDROXYZINE PAMOATE 25 MG: 25 CAPSULE ORAL at 20:46

## 2021-03-28 RX ADMIN — ALBUTEROL SULFATE 2 PUFF: 90 AEROSOL, METERED RESPIRATORY (INHALATION) at 22:46

## 2021-03-28 RX ADMIN — Medication 99 MG: at 15:17

## 2021-03-28 RX ADMIN — ALBUTEROL SULFATE 2 PUFF: 90 AEROSOL, METERED RESPIRATORY (INHALATION) at 16:04

## 2021-03-28 RX ADMIN — HYDROXYZINE PAMOATE 25 MG: 25 CAPSULE ORAL at 11:38

## 2021-03-28 RX ADMIN — TRAMADOL HYDROCHLORIDE 50 MG: 50 TABLET, FILM COATED ORAL at 20:45

## 2021-03-28 RX ADMIN — CLOPIDOGREL BISULFATE 75 MG: 75 TABLET ORAL at 08:56

## 2021-03-28 RX ADMIN — Medication 99 MG: at 20:48

## 2021-03-28 RX ADMIN — CEFOXITIN SODIUM 1000 MG: 1 POWDER, FOR SOLUTION INTRAVENOUS at 08:56

## 2021-03-28 RX ADMIN — HYDROXYZINE PAMOATE 25 MG: 25 CAPSULE ORAL at 01:46

## 2021-03-28 RX ADMIN — TAMSULOSIN HYDROCHLORIDE 0.4 MG: 0.4 CAPSULE ORAL at 08:56

## 2021-03-28 RX ADMIN — Medication 99 MG: at 08:59

## 2021-03-28 RX ADMIN — FUROSEMIDE 10 MG: 10 INJECTION, SOLUTION INTRAMUSCULAR; INTRAVENOUS at 10:19

## 2021-03-28 RX ADMIN — TAMSULOSIN HYDROCHLORIDE 0.4 MG: 0.4 CAPSULE ORAL at 15:17

## 2021-03-28 RX ADMIN — ALBUTEROL SULFATE 2 PUFF: 90 AEROSOL, METERED RESPIRATORY (INHALATION) at 11:16

## 2021-03-28 RX ADMIN — CEFTRIAXONE SODIUM 1000 MG: 1 INJECTION, POWDER, FOR SOLUTION INTRAMUSCULAR; INTRAVENOUS at 20:46

## 2021-03-28 RX ADMIN — TRAMADOL HYDROCHLORIDE 50 MG: 50 TABLET, FILM COATED ORAL at 08:59

## 2021-03-28 RX ADMIN — ALBUTEROL SULFATE 2 PUFF: 90 AEROSOL, METERED RESPIRATORY (INHALATION) at 05:07

## 2021-03-28 ASSESSMENT — PAIN SCALES - GENERAL
PAINLEVEL_OUTOF10: 5
PAINLEVEL_OUTOF10: 3
PAINLEVEL_OUTOF10: 5
PAINLEVEL_OUTOF10: 3
PAINLEVEL_OUTOF10: 2

## 2021-03-28 ASSESSMENT — PAIN DESCRIPTION - ORIENTATION
ORIENTATION: RIGHT
ORIENTATION: LEFT;RIGHT

## 2021-03-28 ASSESSMENT — PAIN DESCRIPTION - PAIN TYPE: TYPE: CHRONIC PAIN

## 2021-03-28 ASSESSMENT — PAIN DESCRIPTION - LOCATION: LOCATION: LEG

## 2021-03-28 NOTE — PLAN OF CARE
Problem: Skin Integrity:  Goal: Will show no infection signs and symptoms  Description: Will show no infection signs and symptoms  Outcome: Met This Shift  Goal: Absence of new skin breakdown  Description: Absence of new skin breakdown  Outcome: Met This Shift  Goal: Demonstration of wound healing without infection will improve  Description: Demonstration of wound healing without infection will improve  Outcome: Met This Shift  Goal: Complications related to intravenous access or infusion will be avoided or minimized  Outcome: Met This Shift     Problem: Safety:  Goal: Free from accidental physical injury  Description: Free from accidental physical injury  Outcome: Met This Shift  Goal: Free from intentional harm  Description: Free from intentional harm  Outcome: Met This Shift  Goal: Ability to remain free from injury will improve  Description: Ability to remain free from injury will improve  Outcome: Met This Shift  Goal: Ability to correctly utilize injury-preventing devices as appropriate will improve  Description: Ability to correctly utilize injury-preventing devices as appropriate will improve  Outcome: Met This Shift     Problem: Daily Care:  Goal: Daily care needs are met  Description: Daily care needs are met  3/28/2021 1159 by Vaishali Jay RN  Outcome: Met This Shift  3/28/2021 0627 by Talat Spivey RN  Outcome: Met This Shift     Problem: Pain:  Goal: Patient's pain/discomfort is manageable  Description: Patient's pain/discomfort is manageable  3/28/2021 1159 by Vaishali Jay RN  Outcome: Met This Shift  3/28/2021 0627 by Talat Spivey RN  Outcome: Ongoing  Goal: Pain level will decrease  Description: Pain level will decrease  Outcome: Met This Shift  Goal: Control of chronic pain  Description: Control of chronic pain  3/28/2021 1159 by Vaishali Jay RN  Outcome: Met This Shift  3/28/2021 0627 by Talat Spivey RN  Outcome: Ongoing     Problem: Discharge Planning:  Goal: Patients continuum of care needs are met  Description: Patients continuum of care needs are met  3/28/2021 7405 by Farhad Mitchell RN  Outcome: Met This Shift     Problem: Falls - Risk of:  Goal: Will remain free from falls  Description: Will remain free from falls  3/28/2021 1159 by Murali Peres RN  Outcome: Met This Shift  3/28/2021 0627 by Farhad Mitchell RN  Outcome: Met This Shift  Goal: Absence of physical injury  Description: Absence of physical injury  3/28/2021 1159 by Murali Peres RN  Outcome: Met This Shift  3/28/2021 0627 by Farhad Mitchell RN  Outcome: Met This Shift     Problem: Pain:  Goal: Pain level will decrease  Description: Pain level will decrease  Outcome: Met This Shift     Problem: Skin Integrity:  Goal: Risk for impaired skin integrity will decrease  Description: Risk for impaired skin integrity will decrease  Outcome: Ongoing     Problem: Infection:  Goal: Will remain free from infection  Description: Will remain free from infection  Outcome: Ongoing     Problem: Pain:  Goal: Control of acute pain  Description: Control of acute pain  3/28/2021 0627 by Farhad Mitchell RN  Outcome: Ongoing     Problem: Skin Integrity:  Goal: Skin integrity will stabilize  Description: Skin integrity will stabilize  3/28/2021 1159 by Murali Peres RN  Outcome: Ongoing  3/28/2021 0627 by Farhad Mitchell RN  Outcome: Ongoing     Problem: Urinary Elimination:  Goal: Will remain free from infection  Description: Will remain free from infection  Outcome: Ongoing  Goal: Skin integrity will improve  Description: Skin integrity will improve  3/28/2021 0627 by Farhad Mitchell RN  Outcome: Ongoing  Goal: Ability to recognize the need to void and respond appropriately will improve  Description: Ability to recognize the need to void and respond appropriately will improve  3/28/2021 0627 by Farhad Mitchell RN  Outcome: Ongoing  Goal: Incidence of incontinence will decrease  Description: Incidence of incontinence will decrease  3/28/2021 0627 by Farhad Mitchell RN  Outcome: Ongoing     Problem:  Activity:  Goal: Ability to avoid complications of mobility impairment will improve  Description: Ability to avoid complications of mobility impairment will improve  3/28/2021 4270 by Farhad Mitchell RN  Outcome: Ongoing  Goal: Range of joint motion will improve  Description: Range of joint motion will improve  3/28/2021 0627 by Farhad Mitchell RN  Outcome: Ongoing  Goal: Ability to ambulate will improve  Description: Ability to ambulate will improve  3/28/2021 0627 by Farhad Mitchell RN  Outcome: Ongoing  Goal: Muscle strength will improve  Description: Muscle strength will improve  3/28/2021 0627 by Farhad Mitchell RN  Outcome: Ongoing     Problem: Nutritional:  Goal: Ability to identify appropriate dietary choices will improve  Description: Ability to identify appropriate dietary choices will improve  3/28/2021 0627 by Farhad Mitchell RN  Outcome: Ongoing  Goal: Dietary intake will improve  Description: Dietary intake will improve  3/28/2021 9039 by Farhad Mitchell RN  Outcome: Ongoing  Goal: Nutritional status will improve  Description: Nutritional status will improve  3/28/2021 0627 by Farhad Mitchell RN  Outcome: Ongoing     Problem: Physical Regulation:  Goal: Will remain free from infection  Description: Will remain free from infection  Outcome: Ongoing  Goal: Complications related to the disease process, condition or treatment will be avoided or minimized  Description: Complications related to the disease process, condition or treatment will be avoided or minimized  3/28/2021 0627 by Farhad Mitchell RN  Outcome: Ongoing  Goal: Diagnostic test results will improve  Description: Diagnostic test results will improve  3/28/2021 0627 by Farhad Mitchell RN  Outcome: Ongoing  Goal: Ability to maintain vital signs within normal range will improve  Description: Ability to maintain vital signs within normal range will improve  3/28/2021 0627 by Farhad Mitchell RN  Outcome: Ongoing

## 2021-03-28 NOTE — PLAN OF CARE
Problem: Daily Care:  Goal: Daily care needs are met  Description: Daily care needs are met  Outcome: Met This Shift     Problem: Discharge Planning:  Goal: Patients continuum of care needs are met  Description: Patients continuum of care needs are met  Outcome: Met This Shift     Problem: Falls - Risk of:  Goal: Will remain free from falls  Description: Will remain free from falls  Outcome: Met This Shift  Goal: Absence of physical injury  Description: Absence of physical injury  Outcome: Met This Shift     Problem: Pain:  Goal: Patient's pain/discomfort is manageable  Description: Patient's pain/discomfort is manageable  Outcome: Ongoing  Goal: Control of acute pain  Description: Control of acute pain  Outcome: Ongoing  Goal: Control of chronic pain  Description: Control of chronic pain  Outcome: Ongoing     Problem: Skin Integrity:  Goal: Skin integrity will stabilize  Description: Skin integrity will stabilize  Outcome: Ongoing     Problem: Urinary Elimination:  Goal: Skin integrity will improve  Description: Skin integrity will improve  Outcome: Ongoing  Goal: Ability to recognize the need to void and respond appropriately will improve  Description: Ability to recognize the need to void and respond appropriately will improve  Outcome: Ongoing  Goal: Incidence of incontinence will decrease  Description: Incidence of incontinence will decrease  Outcome: Ongoing     Problem:  Activity:  Goal: Ability to avoid complications of mobility impairment will improve  Description: Ability to avoid complications of mobility impairment will improve  Outcome: Ongoing  Goal: Range of joint motion will improve  Description: Range of joint motion will improve  Outcome: Ongoing  Goal: Ability to ambulate will improve  Description: Ability to ambulate will improve  Outcome: Ongoing  Goal: Muscle strength will improve  Description: Muscle strength will improve  Outcome: Ongoing     Problem: Nutritional:  Goal: Ability to identify appropriate dietary choices will improve  Description: Ability to identify appropriate dietary choices will improve  Outcome: Ongoing  Goal: Dietary intake will improve  Description: Dietary intake will improve  Outcome: Ongoing  Goal: Nutritional status will improve  Description: Nutritional status will improve  Outcome: Ongoing     Problem: Physical Regulation:  Goal: Complications related to the disease process, condition or treatment will be avoided or minimized  Description: Complications related to the disease process, condition or treatment will be avoided or minimized  Outcome: Ongoing  Goal: Diagnostic test results will improve  Description: Diagnostic test results will improve  Outcome: Ongoing  Goal: Ability to maintain vital signs within normal range will improve  Description: Ability to maintain vital signs within normal range will improve  Outcome: Ongoing

## 2021-03-28 NOTE — PROGRESS NOTES
Dot #3              Subjective: Denies chest pains denies shortness of breath. No   Bm yet. Says he's not uncomfortable but if no Bm by tomorrow he will accept some help. Appetite good today. Objective: Chest slow expiration no rales no rhonchi. No wheezing. Card rrr MRM carotids no bruits. Abdomen soft normal bowel sounds. Legs undressed . Abscess left leg cleanse and damp gauze debrided. Both leg areas of skin breakdown treated. Oedema legs down. Weight down one pound. Afebrile vs stable. I review chest xray myself. Patient alert sense of humor intact. c and s and I and d results back. Organism is serratia marcescens. Resistant to cefazolin. Assessment: bilateral cellulitis lower legs, with abscess left leg serratia marcescens, post renal acute insufficiency superimposed on chronic renal insufficiency. Chronic venous stasis disease copd primary pulmonary hypertension cardiomegaly et. al..        Plan: switch antibiotic coverage. Advance diuretic routine. All of the patients questions answered.   So far tolerating tamsulosin  Laxative if no results in am    Delmi Humphries MD

## 2021-03-29 LAB
ANION GAP SERPL CALCULATED.3IONS-SCNC: 8 MMOL/L (ref 9–17)
BUN BLDV-MCNC: 30 MG/DL (ref 8–23)
BUN/CREAT BLD: 25 (ref 9–20)
CALCIUM SERPL-MCNC: 9.5 MG/DL (ref 8.6–10.4)
CHLORIDE BLD-SCNC: 105 MMOL/L (ref 98–107)
CO2: 28 MMOL/L (ref 20–31)
CREAT SERPL-MCNC: 1.21 MG/DL (ref 0.7–1.2)
CULTURE: ABNORMAL
DIRECT EXAM: ABNORMAL
GFR AFRICAN AMERICAN: >60 ML/MIN
GFR NON-AFRICAN AMERICAN: 56 ML/MIN
GFR SERPL CREATININE-BSD FRML MDRD: ABNORMAL ML/MIN/{1.73_M2}
GFR SERPL CREATININE-BSD FRML MDRD: ABNORMAL ML/MIN/{1.73_M2}
GLUCOSE BLD-MCNC: 102 MG/DL (ref 70–99)
Lab: ABNORMAL
POTASSIUM SERPL-SCNC: 4 MMOL/L (ref 3.7–5.3)
SODIUM BLD-SCNC: 141 MMOL/L (ref 135–144)
SPECIMEN DESCRIPTION: ABNORMAL

## 2021-03-29 PROCEDURE — 1200000000 HC SEMI PRIVATE

## 2021-03-29 PROCEDURE — 6370000000 HC RX 637 (ALT 250 FOR IP): Performed by: SURGERY

## 2021-03-29 PROCEDURE — 6370000000 HC RX 637 (ALT 250 FOR IP)

## 2021-03-29 PROCEDURE — 99222 1ST HOSP IP/OBS MODERATE 55: CPT | Performed by: FAMILY MEDICINE

## 2021-03-29 PROCEDURE — 36415 COLL VENOUS BLD VENIPUNCTURE: CPT

## 2021-03-29 PROCEDURE — 6360000002 HC RX W HCPCS: Performed by: SURGERY

## 2021-03-29 PROCEDURE — 2580000003 HC RX 258: Performed by: SURGERY

## 2021-03-29 PROCEDURE — 51798 US URINE CAPACITY MEASURE: CPT

## 2021-03-29 PROCEDURE — 80048 BASIC METABOLIC PNL TOTAL CA: CPT

## 2021-03-29 PROCEDURE — 94761 N-INVAS EAR/PLS OXIMETRY MLT: CPT

## 2021-03-29 PROCEDURE — 94640 AIRWAY INHALATION TREATMENT: CPT

## 2021-03-29 RX ORDER — IPRATROPIUM BROMIDE AND ALBUTEROL SULFATE 2.5; .5 MG/3ML; MG/3ML
1 SOLUTION RESPIRATORY (INHALATION)
Status: DISCONTINUED | OUTPATIENT
Start: 2021-03-29 | End: 2021-03-30 | Stop reason: HOSPADM

## 2021-03-29 RX ORDER — IPRATROPIUM BROMIDE AND ALBUTEROL SULFATE 2.5; .5 MG/3ML; MG/3ML
SOLUTION RESPIRATORY (INHALATION)
Status: COMPLETED
Start: 2021-03-29 | End: 2021-03-29

## 2021-03-29 RX ADMIN — TAMSULOSIN HYDROCHLORIDE 0.4 MG: 0.4 CAPSULE ORAL at 07:26

## 2021-03-29 RX ADMIN — ALBUTEROL SULFATE 2 PUFF: 90 AEROSOL, METERED RESPIRATORY (INHALATION) at 05:02

## 2021-03-29 RX ADMIN — TRAMADOL HYDROCHLORIDE 50 MG: 50 TABLET, FILM COATED ORAL at 07:26

## 2021-03-29 RX ADMIN — Medication 99 MG: at 09:19

## 2021-03-29 RX ADMIN — Medication 210 MG: at 14:57

## 2021-03-29 RX ADMIN — IPRATROPIUM BROMIDE AND ALBUTEROL SULFATE 1 AMPULE: .5; 3 SOLUTION RESPIRATORY (INHALATION) at 09:38

## 2021-03-29 RX ADMIN — IPRATROPIUM BROMIDE AND ALBUTEROL SULFATE 1 AMPULE: .5; 3 SOLUTION RESPIRATORY (INHALATION) at 17:51

## 2021-03-29 RX ADMIN — Medication 99 MG: at 21:11

## 2021-03-29 RX ADMIN — SODIUM CHLORIDE, POTASSIUM CHLORIDE, SODIUM LACTATE AND CALCIUM CHLORIDE: 600; 310; 30; 20 INJECTION, SOLUTION INTRAVENOUS at 07:27

## 2021-03-29 RX ADMIN — Medication 99 MG: at 14:54

## 2021-03-29 RX ADMIN — IPRATROPIUM BROMIDE AND ALBUTEROL SULFATE 1 AMPULE: .5; 3 SOLUTION RESPIRATORY (INHALATION) at 13:38

## 2021-03-29 RX ADMIN — CLOPIDOGREL BISULFATE 75 MG: 75 TABLET ORAL at 07:26

## 2021-03-29 RX ADMIN — SPIRONOLACTONE 25 MG: 25 TABLET, FILM COATED ORAL at 07:26

## 2021-03-29 RX ADMIN — CEFTRIAXONE SODIUM 1000 MG: 1 INJECTION, POWDER, FOR SOLUTION INTRAMUSCULAR; INTRAVENOUS at 21:10

## 2021-03-29 RX ADMIN — ATORVASTATIN CALCIUM 40 MG: 40 TABLET, FILM COATED ORAL at 21:10

## 2021-03-29 RX ADMIN — HYDROXYZINE PAMOATE 25 MG: 25 CAPSULE ORAL at 06:07

## 2021-03-29 RX ADMIN — TAMSULOSIN HYDROCHLORIDE 0.4 MG: 0.4 CAPSULE ORAL at 16:59

## 2021-03-29 RX ADMIN — PANTOPRAZOLE SODIUM 40 MG: 40 TABLET, DELAYED RELEASE ORAL at 05:28

## 2021-03-29 RX ADMIN — IPRATROPIUM BROMIDE AND ALBUTEROL SULFATE 1 AMPULE: .5; 3 SOLUTION RESPIRATORY (INHALATION) at 20:59

## 2021-03-29 ASSESSMENT — PAIN DESCRIPTION - ORIENTATION: ORIENTATION: RIGHT;LEFT

## 2021-03-29 ASSESSMENT — PAIN SCALES - GENERAL
PAINLEVEL_OUTOF10: 3
PAINLEVEL_OUTOF10: 0
PAINLEVEL_OUTOF10: 6
PAINLEVEL_OUTOF10: 0

## 2021-03-29 ASSESSMENT — PAIN DESCRIPTION - LOCATION: LOCATION: LEG

## 2021-03-29 ASSESSMENT — PAIN DESCRIPTION - PAIN TYPE: TYPE: CHRONIC PAIN

## 2021-03-29 NOTE — PLAN OF CARE
This Shift  Goal: Risk for impaired skin integrity will decrease  Description: Risk for impaired skin integrity will decrease  3/29/2021 0142 by Maria Fernanda Forrest RN  Outcome: Ongoing  3/28/2021 1159 by Leonila Jarrett RN  Outcome: Ongoing  Goal: Demonstration of wound healing without infection will improve  Description: Demonstration of wound healing without infection will improve  3/29/2021 0142 by Maria Fernanda Forrest RN  Outcome: Ongoing  3/28/2021 1159 by Leonila Jarrett RN  Outcome: Met This Shift  Goal: Complications related to intravenous access or infusion will be avoided or minimized  3/29/2021 0142 by Maria Fernanda Forrest RN  Outcome: Ongoing  3/28/2021 1159 by Leonila Jarrett RN  Outcome: Met This Shift     Problem: Infection:  Goal: Will remain free from infection  Description: Will remain free from infection  3/29/2021 0142 by Maria Fernanda Forrest RN  Outcome: Ongoing  3/28/2021 1159 by Leonila Jarrett RN  Outcome: Ongoing     Problem: Pain:  Goal: Pain level will decrease  Description: Pain level will decrease  3/29/2021 0142 by Maria Fernanda Forrest RN  Outcome: Ongoing  3/28/2021 1159 by Leonila Jarrett RN  Outcome: Met This Shift  Goal: Control of chronic pain  Description: Control of chronic pain  3/29/2021 0142 by Maria Fernanda Forrest RN  Outcome: Ongoing  3/28/2021 1159 by Leonila Jarrett RN  Outcome: Met This Shift     Problem: Skin Integrity:  Goal: Skin integrity will stabilize  Description: Skin integrity will stabilize  3/29/2021 0142 by Maria Fernanda Forrest RN  Outcome: Ongoing  3/28/2021 1159 by Leonila Jarrett RN  Outcome: Ongoing     Problem: Urinary Elimination:  Goal: Will remain free from infection  Description: Will remain free from infection  3/29/2021 0142 by Maria Fernanda Forrest RN  Outcome: Ongoing  3/28/2021 1159 by Leonila Jarrett RN  Outcome: Ongoing     Problem: Pain:  Goal: Pain level will decrease  Description: Pain level will decrease  3/29/2021 0142 by Maria Fernanda Forrest RN  Outcome: Ongoing  3/28/2021 1159 by Leonila Jarrett RN  Outcome:  Met This Shift     Problem: Physical Regulation:  Goal: Will remain free from infection  Description: Will remain free from infection  3/29/2021 0142 by Elaine Chapman RN  Outcome: Ongoing  3/28/2021 1159 by Marycruz Flood RN  Outcome: Ongoing

## 2021-03-29 NOTE — PROGRESS NOTES
Comprehensive Nutrition Assessment    Type and Reason for Visit:  Reassess    Nutrition Recommendations/Plan:  Encourage oral intakes     Nutrition Assessment:  Continued severe malnutrition, r/t inadequate nutrient intakes, AEB moderate fat and muscle losses in presence of obesity. PO intakes reported as good, with weight being fairly stable since admission. Continued improvement of renal indices. Loose bm noted after c/o constipation (on mag citrate). Malnutrition Assessment:  Malnutrition Status:  Severe malnutrition    Context:  Acute Illness     Findings of the 6 clinical characteristics of malnutrition:  Energy Intake:  No significant decrease in energy intake  Weight Loss:  No significant weight loss     Body Fat Loss:  7 - Moderate body fat loss Orbital, Buccal region   Muscle Mass Loss:  7 - Moderate muscle mass loss Temples (temporalis)  Fluid Accumulation:  7 - Moderate to Severe Extremities   Strength:  Not Performed    Estimated Daily Nutrient Needs:  Energy (kcal):  3139-3127(02-59); Weight Used for Energy Requirements:  Current     Protein (g):  77-88(1.3-1.5); Weight Used for Protein Requirements:  Ideal        Fluid (ml/day):  1700; Method Used for Fluid Requirements:  1 ml/kcal      Nutrition Related Findings:  moderate fat and muscle losses in temples, orbitals and buccal regions (despite obesity)      Wounds:  Skin Tears(legs)       Current Nutrition Therapies:    DIET GENERAL;     Anthropometric Measures:  · Height: 5' 4\" (162.6 cm)  · Current Body Weight: 204 lb 15 oz (93 kg)   · Admission Body Weight: 215 lb (97.5 kg)(full of urine (couldn't pee))    · Usual Body Weight: 210 lb 12.8 oz (95.6 kg)     · Ideal Body Weight: 130 lbs; % Ideal Body Weight 158.2 %   · BMI: 35.2  · Adjusted Body Weight:  ; No Adjustment   · BMI Categories: Obese Class 2 (BMI 35.0 -39.9)       Nutrition Diagnosis:   · Severe malnutrition related to inadequate protein-energy intake as evidenced by moderate loss of

## 2021-03-29 NOTE — PROGRESS NOTES
Elvie Mccarthy from [place] [address] [facility] reports that Dr. Alon Tompkins has a full day with scheduled patients and will do his best to consult this patient. Patient information given to Elvie Mccarthy and said Dr. Alon Tompkins would contact whenever he could.

## 2021-03-29 NOTE — PROGRESS NOTES
Dot# 4              Subjective: Patient denies chest pains shortness of breath. He is eating better. Bowels haven't moved significantly  Yet. He reports pain in soft tissues of lower extremities but much improved. He slept a little better last evening. He has just voided three and one half hours since robledo cath removal.      Objective: Chest clear to ascultation with sibilant expiratory wheezing. Card rrr mrm grade 2. Carotids no bruits. Legs unwrapped to skin level. Oedema considerably down. Capillary filling less than two seconds. Heels skin intact. BladderScan shows no significant residual.  Wounds cleansed peroxide and then dried. photodocumentation then treated and allowed to dry. Dressings and ace wraps reapplied. He has full command of his extremities and his sense of humor is intact. We await opinion of cardiology regarding possible silent mi in interval between hospitalizations. Patient denies any episode that would suggest cardiac event. Afebrile vs stable. Assessment: bilateral cellulitis lower legs, with abscess left leg serratia marcescens, post renal acute insufficiency superimposed on chronic renal insufficiency. Chronic venous stasis disease copd primary pulmonary hypertension cardiomegaly et. al..         Plan: Will continue current regimen of care. Will document residuals on several more post void events. If all remains well with vs, uo, and respiratory status , will,  Consider discharge after 1 pm tomorrow to home out patient follow up.         Isabella Elliott MD

## 2021-03-30 VITALS
HEIGHT: 64 IN | BODY MASS INDEX: 37.34 KG/M2 | TEMPERATURE: 98.2 F | HEART RATE: 71 BPM | DIASTOLIC BLOOD PRESSURE: 59 MMHG | OXYGEN SATURATION: 94 % | SYSTOLIC BLOOD PRESSURE: 115 MMHG | WEIGHT: 218.7 LBS | RESPIRATION RATE: 21 BRPM

## 2021-03-30 LAB
ANION GAP SERPL CALCULATED.3IONS-SCNC: 6 MMOL/L (ref 9–17)
BNP INTERPRETATION: ABNORMAL
BUN BLDV-MCNC: 28 MG/DL (ref 8–23)
BUN/CREAT BLD: 24 (ref 9–20)
CALCIUM SERPL-MCNC: 9.5 MG/DL (ref 8.6–10.4)
CHLORIDE BLD-SCNC: 107 MMOL/L (ref 98–107)
CO2: 29 MMOL/L (ref 20–31)
CREAT SERPL-MCNC: 1.15 MG/DL (ref 0.7–1.2)
EKG ATRIAL RATE: 71 BPM
EKG P AXIS: 29 DEGREES
EKG P-R INTERVAL: 216 MS
EKG Q-T INTERVAL: 382 MS
EKG QRS DURATION: 72 MS
EKG QTC CALCULATION (BAZETT): 415 MS
EKG R AXIS: -33 DEGREES
EKG T AXIS: 4 DEGREES
EKG VENTRICULAR RATE: 71 BPM
GFR AFRICAN AMERICAN: >60 ML/MIN
GFR NON-AFRICAN AMERICAN: 60 ML/MIN
GFR SERPL CREATININE-BSD FRML MDRD: ABNORMAL ML/MIN/{1.73_M2}
GFR SERPL CREATININE-BSD FRML MDRD: ABNORMAL ML/MIN/{1.73_M2}
GLUCOSE BLD-MCNC: 106 MG/DL (ref 70–99)
LV EF: 55 %
LVEF MODALITY: NORMAL
POTASSIUM SERPL-SCNC: 4 MMOL/L (ref 3.7–5.3)
PRO-BNP: 1076 PG/ML
SODIUM BLD-SCNC: 142 MMOL/L (ref 135–144)

## 2021-03-30 PROCEDURE — 93005 ELECTROCARDIOGRAM TRACING: CPT | Performed by: FAMILY MEDICINE

## 2021-03-30 PROCEDURE — 93010 ELECTROCARDIOGRAM REPORT: CPT | Performed by: INTERNAL MEDICINE

## 2021-03-30 PROCEDURE — G0008 ADMIN INFLUENZA VIRUS VAC: HCPCS | Performed by: SURGERY

## 2021-03-30 PROCEDURE — 93306 TTE W/DOPPLER COMPLETE: CPT

## 2021-03-30 PROCEDURE — 2580000003 HC RX 258: Performed by: SURGERY

## 2021-03-30 PROCEDURE — 83880 ASSAY OF NATRIURETIC PEPTIDE: CPT

## 2021-03-30 PROCEDURE — 6370000000 HC RX 637 (ALT 250 FOR IP): Performed by: SURGERY

## 2021-03-30 PROCEDURE — 80048 BASIC METABOLIC PNL TOTAL CA: CPT

## 2021-03-30 PROCEDURE — 94640 AIRWAY INHALATION TREATMENT: CPT

## 2021-03-30 PROCEDURE — 36415 COLL VENOUS BLD VENIPUNCTURE: CPT

## 2021-03-30 PROCEDURE — 90686 IIV4 VACC NO PRSV 0.5 ML IM: CPT | Performed by: SURGERY

## 2021-03-30 PROCEDURE — 6360000002 HC RX W HCPCS: Performed by: SURGERY

## 2021-03-30 RX ORDER — SODIUM CHLORIDE 0.9 % (FLUSH) 0.9 %
10 SYRINGE (ML) INJECTION PRN
Status: DISCONTINUED | OUTPATIENT
Start: 2021-03-30 | End: 2021-03-30 | Stop reason: HOSPADM

## 2021-03-30 RX ORDER — FUROSEMIDE 20 MG/1
20 TABLET ORAL EVERY OTHER DAY
Qty: 60 TABLET | Refills: 3 | Status: ON HOLD
Start: 2021-03-30 | End: 2021-07-16 | Stop reason: HOSPADM

## 2021-03-30 RX ORDER — FUROSEMIDE 20 MG/1
20 TABLET ORAL ONCE
Status: COMPLETED | OUTPATIENT
Start: 2021-03-30 | End: 2021-03-30

## 2021-03-30 RX ORDER — SODIUM CHLORIDE 0.9 % (FLUSH) 0.9 %
10 SYRINGE (ML) INJECTION EVERY 12 HOURS SCHEDULED
Status: DISCONTINUED | OUTPATIENT
Start: 2021-03-30 | End: 2021-03-30 | Stop reason: HOSPADM

## 2021-03-30 RX ORDER — TAMSULOSIN HYDROCHLORIDE 0.4 MG/1
0.4 CAPSULE ORAL 2 TIMES DAILY
Qty: 60 CAPSULE | Refills: 2 | Status: ON HOLD | OUTPATIENT
Start: 2021-03-30 | End: 2021-07-16 | Stop reason: HOSPADM

## 2021-03-30 RX ADMIN — SODIUM CHLORIDE, POTASSIUM CHLORIDE, SODIUM LACTATE AND CALCIUM CHLORIDE: 600; 310; 30; 20 INJECTION, SOLUTION INTRAVENOUS at 04:00

## 2021-03-30 RX ADMIN — ALBUTEROL SULFATE 2 PUFF: 90 AEROSOL, METERED RESPIRATORY (INHALATION) at 05:53

## 2021-03-30 RX ADMIN — Medication 99 MG: at 09:33

## 2021-03-30 RX ADMIN — IPRATROPIUM BROMIDE AND ALBUTEROL SULFATE 1 AMPULE: .5; 3 SOLUTION RESPIRATORY (INHALATION) at 09:19

## 2021-03-30 RX ADMIN — PANTOPRAZOLE SODIUM 40 MG: 40 TABLET, DELAYED RELEASE ORAL at 06:31

## 2021-03-30 RX ADMIN — INFLUENZA A VIRUS A/VICTORIA/2454/2019 IVR-207 (H1N1) ANTIGEN (PROPIOLACTONE INACTIVATED), INFLUENZA A VIRUS A/HONG KONG/2671/2019 IVR-208 (H3N2) ANTIGEN (PROPIOLACTONE INACTIVATED), INFLUENZA B VIRUS B/VICTORIA/705/2018 BVR-11 ANTIGEN (PROPIOLACTONE INACTIVATED), INFLUENZA B VIRUS B/PHUKET/3073/2013 BVR-1B ANTIGEN (PROPIOLACTONE INACTIVATED) 0.5 ML: 15; 15; 15; 15 INJECTION, SUSPENSION INTRAMUSCULAR at 15:12

## 2021-03-30 RX ADMIN — TRAMADOL HYDROCHLORIDE 50 MG: 50 TABLET, FILM COATED ORAL at 07:48

## 2021-03-30 RX ADMIN — IPRATROPIUM BROMIDE AND ALBUTEROL SULFATE 1 AMPULE: .5; 3 SOLUTION RESPIRATORY (INHALATION) at 13:30

## 2021-03-30 RX ADMIN — MAGNESIUM CITRATE 296 ML: 1.75 LIQUID ORAL at 09:53

## 2021-03-30 RX ADMIN — CLOPIDOGREL BISULFATE 75 MG: 75 TABLET ORAL at 09:33

## 2021-03-30 RX ADMIN — FUROSEMIDE 20 MG: 20 TABLET ORAL at 10:12

## 2021-03-30 RX ADMIN — SPIRONOLACTONE 25 MG: 25 TABLET, FILM COATED ORAL at 09:33

## 2021-03-30 RX ADMIN — SODIUM CHLORIDE, PRESERVATIVE FREE 10 ML: 5 INJECTION INTRAVENOUS at 09:54

## 2021-03-30 RX ADMIN — CEFTRIAXONE SODIUM 1000 MG: 1 INJECTION, POWDER, FOR SOLUTION INTRAMUSCULAR; INTRAVENOUS at 17:27

## 2021-03-30 RX ADMIN — TAMSULOSIN HYDROCHLORIDE 0.4 MG: 0.4 CAPSULE ORAL at 17:20

## 2021-03-30 RX ADMIN — TAMSULOSIN HYDROCHLORIDE 0.4 MG: 0.4 CAPSULE ORAL at 09:33

## 2021-03-30 RX ADMIN — Medication 210 MG: at 13:52

## 2021-03-30 RX ADMIN — Medication 99 MG: at 13:52

## 2021-03-30 ASSESSMENT — PAIN SCALES - GENERAL
PAINLEVEL_OUTOF10: 1
PAINLEVEL_OUTOF10: 4
PAINLEVEL_OUTOF10: 4
PAINLEVEL_OUTOF10: 0

## 2021-03-30 ASSESSMENT — PAIN DESCRIPTION - LOCATION
LOCATION: ANKLE
LOCATION_2: HEAD

## 2021-03-30 ASSESSMENT — PAIN DESCRIPTION - ORIENTATION: ORIENTATION: RIGHT;LEFT

## 2021-03-30 ASSESSMENT — PAIN DESCRIPTION - INTENSITY: RATING_2: 2

## 2021-03-30 NOTE — CONSULTS
Patient: Francisco Javier Villar  : 1930  Date of Admission: 3/25/2021  Primary Care Physician: Jean Manriquez  Today's Date: 3/29/2021    REASON FOR CONSULTATION: Leg Pain (Pt has increased pain and swelling to both legs since yesterday. Pt PCP is Lacey Bynum.)      HPI:  Mr. Mia Thomason is a 80 y.o. male who was admitted to the hospital for what sounds to be pretty severe lower extremity cellulitis. In the ER he was found to be in  atrial fibrillation with RVR leading to this consultation. Mr. Mia Thomason has a history of paroxysmal atrial fibrillation for several years and has had past hospitalizations for this in the past. Having said this, Mr. Mia Thomason denies any current symptoms of chest pain, chest pressure or other symptoms. Mr. Mia Thomason denies any known history of heart problems in the past including heart attacks. He denied any current or recent chest pain, abdominal pain, bleeding problems, problems with his medications or any other concerns at this time. Past Medical History:   Diagnosis Date    Arthritis     COPD (chronic obstructive pulmonary disease) ([address]     Dependent edema     Hyperlipidemia     Hypertension     Psoriasis        CURRENT ALLERGIES: Patient has no known allergies. REVIEW OF SYSTEMS: 14 systems were reviewed. Pertinent positives and negatives as above, all else negative.      Past Surgical History:   Procedure Laterality Date    ANGIOPLASTY Left 2018    Claudine Tong -- leg    HERNIA REPAIR      NASAL POLYP SURGERY Bilateral     TONSILLECTOMY      TURP N/A 2019    CYSTOSCOPY TRANSURETHRAL RESECTION -PROSTATE LASER- PVP GREENLIGHT performed by Lukasz Yang MD at [facility] OR    Social History:  Social History     Tobacco Use    Smoking status: Current Every Day Smoker     Packs/day: 2.00     Years: 70.00     Pack years: 140.00     Types: Pipe    Smokeless tobacco: Never Used    Tobacco comment: declines couseling    Substance Use Topics    Alcohol use: No    Drug use: No        CURRENT MEDICATIONS:  Outpatient Medications Marked as Taking for the 3/25/21 encounter [facility] Encounter)   Medication Sig Dispense Refill    cephALEXin (KEFLEX) 250 MG capsule Take 250 mg by mouth 4 times daily      spironolactone (ALDACTONE) 25 MG tablet Take 0.5 tablets by mouth daily 30 tablet 3    furosemide (LASIX) 20 MG tablet Take 20 mg by mouth daily      POTASSIUM CITRATE PO Take 99 mg by mouth daily      tamsulosin (FLOMAX) 0.4 MG capsule Take 1 capsule by mouth daily 30 capsule 2    clopidogrel (PLAVIX) 75 MG tablet Take 75 mg by mouth daily       hydrOXYzine (VISTARIL) 50 MG capsule Take 50 mg by mouth every 6 hours as needed       B Complex-Biotin-FA (B COMPLETE PO) Take 1 tablet by mouth daily       vitamin C (ASCORBIC ACID) 500 MG tablet Take 500 mg by mouth daily      vitamin D (CHOLECALCIFEROL) 1000 UNIT TABS tablet Take 1,000 Units by mouth daily      vitamin E 1000 units capsule Take 1,000 Units by mouth daily      Coenzyme Q10 (CO Q 10 PO) Take 1 tablet by mouth daily       Magnesium Citrate 100 MG TABS Take 100 mg by mouth daily      atorvastatin (LIPITOR) 40 MG tablet Take 40 mg by mouth nightly      melatonin 5 MG TABS tablet Take 5 mg by mouth daily as needed      BOSWELLIA HUGH PO Take 250 mg by mouth daily      Homeopathic Products (HOMEOPATHIC CALM SL) Take 1 tablet by mouth daily      ibuprofen (ADVIL;MOTRIN) 200 MG tablet Take 400 mg by mouth every 6 hours as needed for Pain         FAMILY HISTORY: family history is not on file. PHYSICAL EXAM:   BP (!) 128/56   Pulse 68   Temp 98.4 °F (36.9 °C) (Oral)   Resp 18   Ht 5' 4\" (1.626 m)   Wt 204 lb 15 oz (93 kg)   SpO2 95%   BMI 35.18 kg/m²  Body mass index is 35.18 kg/m². [ INSTRUCTIONS:  \"[x]\" Indicates a positive item  \"[]\" Indicates a negative item   Vital Signs: (As obtained by patient/caregiver or practitioner observation)  No flowsheet data found. Constitutional: [x] Appears well-developed and well-nourished [x] No apparent distress      [] Abnormal-   Mental status  [x] Alert and awake  [x] Oriented to person/place/time [x]Able to follow commands      Eyes:  EOM    [x]  Normal  [] Abnormal-  Sclera  [x]  Normal  [] Abnormal -         Discharge [x]  None visible  [] Abnormal -    HENT:   [x] Normocephalic, atraumatic.   [] Abnormal   [] Mouth/Throat: Mucous membranes are moist.     External Ears [x] Normal  [] Abnormal-     Neck: [x] No visualized mass     Pulmonary/Chest: [x] Respiratory effort normal.  [x] No visualized signs of difficulty breathing or respiratory distress        [] Abnormal-     Neurological:        [x] No Facial Asymmetry (Cranial nerve 7 motor function) (limited exam to video visit)          [] No gaze palsy        [] Abnormal-         Skin:        [x] No significant exanthematous lesions or discoloration noted on facial skin         [] Abnormal-            Psychiatric:       [x] Normal Affect [] No Hallucinations        [] Abnormal-     Other pertinent observable physical exam findings: None       MOST RECENT LABS ON RECORD:   Lab Results   Component Value Date    WBC 8.8 03/25/2021    HGB 13.8 03/25/2021    HCT 41.0 03/25/2021     03/25/2021    ALT 15 03/25/2021    AST 23 03/25/2021     03/29/2021    K 4.0 03/29/2021     03/29/2021    CREATININE 1.21 (H) 03/29/2021    BUN 30 (H) 03/29/2021    CO2 28 03/29/2021    TSH 1.94 04/27/2015    PSA 3.38 08/29/2012         ASSESSMENT:  Patient Active Problem List    Diagnosis Date Noted    Severe malnutrition ([address] 03/26/2021    Renal failure associated with renal vascular disease 03/25/2021    Cellulitis 10/28/2020    Nocturia 04/25/2019    Cellulitis of left leg 10/19/2018    Left leg cellulitis 10/19/2018    Urinary retention 06/29/2018    BPH with obstruction/lower urinary tract symptoms 06/29/2018    Dysuria 06/29/2018    Frequency of urination 06/29/2018    at 8:49 PM    An electronic signature was used to authenticate this note.

## 2021-03-30 NOTE — PROGRESS NOTES
Dr. Adriane Otero called in for update. New order to saline lock IV. Radiology in room now for ECHO.

## 2021-03-30 NOTE — PROGRESS NOTES
Dr. Lilli Tovar called for update on pt. Video chat set up and Dr. Lilli Tovar speaks to pt. Physician orders EKG and ECHO for the AM on Tuesday March 20, 2021. Dr. Victor Manuel Rodriguez called and updated on the above. Pt informed of testing and the importance of staying until the above is completed. Pt voices verbal understanding.

## 2021-03-30 NOTE — DISCHARGE INSTR - SUPPLEMENTARY INSTRUCTIONS
Rifampin 300 mg pill one pill two times a day. Urine will temporarily ture red or orange while taking this pill .

## 2021-03-30 NOTE — PROGRESS NOTES
Dot #5          Subjective: Patient denies chest pains denies shortness of breath. We discuss his hospital stay and care plan (as I do his wound care). He reports that his bowels have moved well. Objective: chest slow expiration no rales no rhonchi card rrr MRM  Carotids no bruits. Abdomen soft normal bowel sounds. Legs undressed to skin lever. Wounds painted right leg and air dry. Wound left leg cleansed peroxide and damp gauze debrided. Both legs ace wrapped. Oedema reduced and cellulitis no longer in evidence and abscess floor has no undercuts. Iv site non phlebitic. Patient alert and oriented. Asks relevant questions. Much thank for cardiology opinion and evaluation. Will give today's  Dose of parenteral antibiotics early . Assessment: bilateral cellulitis lower legs, with abscess left leg serratia marcescens, post renal acute insufficiency superimposed on chronic renal insufficiency. Chronic venous stasis disease copd primary pulmonary hypertension cardiomegaly et. al..          Plan: Discharge to home with outpatient follow up  All his questions answered. Continue tansulosin bid. Daily weights at home   continue current topical wound care regimen.       Megan Guzman MD

## 2021-03-30 NOTE — PROGRESS NOTES
Called daughter Ebenezer Dove. She is coming to  patient after 5pm. Updated on patients condition. Questions answered at this time.  Will wait to go over discharge instructions with both patient and daughter per patient request.

## 2021-03-30 NOTE — PROGRESS NOTES
Called daughter Kimberly Seen with an update. All questions answered. Will call back later today with update on discharge plans.

## 2021-03-30 NOTE — PLAN OF CARE
Problem: Skin Integrity:  Goal: Absence of new skin breakdown  Description: Absence of new skin breakdown  3/30/2021 0947 by Prashant Dooley RN  Outcome: Met This Shift  3/30/2021 0113 by Parag Tavarez RN  Outcome: Ongoing  Goal: Complications related to intravenous access or infusion will be avoided or minimized  Outcome: Met This Shift     Problem: Safety:  Goal: Free from accidental physical injury  Description: Free from accidental physical injury  Outcome: Met This Shift     Problem: Daily Care:  Goal: Daily care needs are met  Description: Daily care needs are met  Outcome: Met This Shift     Problem: Falls - Risk of:  Goal: Will remain free from falls  Description: Will remain free from falls  Outcome: Met This Shift     Problem: Health Behavior:  Goal: Compliance with treatment plan for underlying cause of condition will improve  Description: Compliance with treatment plan for underlying cause of condition will improve  Outcome: Met This Shift     Problem:  Body Temperature - Imbalanced:  Goal: Ability to maintain a body temperature in the normal range will improve  Description: Ability to maintain a body temperature in the normal range will improve  Outcome: Met This Shift     Problem: Gas Exchange - Impaired:  Goal: Levels of oxygenation will improve  Description: Levels of oxygenation will improve  Outcome: Met This Shift     Problem: Skin Integrity:  Goal: Will show no infection signs and symptoms  Description: Will show no infection signs and symptoms  3/30/2021 0947 by Prashant Dooley RN  Outcome: Ongoing  3/30/2021 0113 by Parag Tavarez RN  Outcome: Ongoing     Problem: Infection:  Goal: Will remain free from infection  Description: Will remain free from infection  Outcome: Ongoing     Problem: Pain:  Goal: Patient's pain/discomfort is manageable  Description: Patient's pain/discomfort is manageable  Outcome: Ongoing  Goal: Pain level will decrease  Description: Pain level will decrease  Outcome: Ongoing     Problem: Sensory:  Goal: General experience of comfort will improve  Description: General experience of comfort will improve  Outcome: Ongoing     Problem: Mobility - Impaired:  Goal: Mobility will improve  Description: Mobility will improve  Outcome: Ongoing     Problem: Fluid Volume:  Goal: Maintenance of adequate hydration will improve  Description: Maintenance of adequate hydration will improve  Outcome: Ongoing     Problem: Urinary Elimination:  Goal: Will remain free from infection  Description: Will remain free from infection  Outcome: Ongoing     Problem: Discharge Planning:  Goal: Discharged to appropriate level of care  Description: Discharged to appropriate level of care  Outcome: Ongoing     Problem: Pain:  Goal: Pain level will decrease  Description: Pain level will decrease  Outcome: Ongoing  Goal: Control of chronic pain  Description: Control of chronic pain  Outcome: Ongoing     Problem:  Activity:  Goal: Mobility will improve  Description: Mobility will improve  Outcome: Ongoing     Problem: Nutritional:  Goal: Nutritional status will improve  Description: Nutritional status will improve  Outcome: Ongoing     Problem: Physical Regulation:  Goal: Will remain free from infection  Description: Will remain free from infection  Outcome: Ongoing  Goal: Ability to maintain vital signs within normal range will improve  Description: Ability to maintain vital signs within normal range will improve  Outcome: Ongoing     Problem: Respiratory:  Goal: Ability to maintain normal respiratory secretions will improve  Description: Ability to maintain normal respiratory secretions will improve  Outcome: Ongoing     Problem: Cardiac:  Goal: Ability to maintain vital signs within normal range will improve  Description: Ability to maintain vital signs within normal range will improve  Outcome: Ongoing

## 2021-03-30 NOTE — PROGRESS NOTES
Patient educated on Flu Vaccine. Given [facility] information handout. All questions answered. Flu vaccine given at this time.

## 2021-03-30 NOTE — PROGRESS NOTES
Dr. Hayden Rizzo in to do wound care. Pt tolerates well. Pt to receive today's Rocephin dose before discharge. Per Asiya Barnes D, OK for Anoop Board to receive dose at 1700 tonight before discharge.

## 2021-03-31 ENCOUNTER — TELEPHONE (OUTPATIENT)
Dept: CARDIOLOGY | Age: 86
End: 2021-03-31

## 2021-03-31 NOTE — TELEPHONE ENCOUNTER
----- Message from Narinder Loredo MD sent at 3/31/2021 12:07 AM EDT -----  Let Mr. Coreen Mcqueen know their test result was ok. Thanks.

## 2021-04-02 NOTE — DISCHARGE SUMMARY
[place] [id number]                               DISCHARGE SUMMARY    PATIENT NAME: Annah Ormond                   :        1930  MED REC NO:   [id number]                              ROOM:       [id number]  ACCOUNT NO:   [de-identified]                           ADMIT DATE: 2021  PROVIDER:     Eleanor Leach                      [facility] DATE: 2021    ADMITTING DIAGNOSES:  Cellulitis of both lower extremities, renal  failure associated with renovascular disease, code I15.0.    DISCHARGE DIAGNOSES:  Serratia marcescens abscess, left lower extremity  plus bilateral lower extremity cellulitis and renal failure associated  with renovascular disease. SECONDARY DIAGNOSES:  Moderate malnutrition, urinary retention,  prostatic hypertrophy with urinary outflow obstruction, bilateral lower  extremity edema, abnormal electrocardiogram.    PAST MEDICAL HISTORY:  Arthritis, COPD, dependent edema, venous stasis  disease bilaterally, hyperlipidemia, essential hypertension, past  history of psoriatic dermatitis. PAST SURGICAL HISTORY:  Angioplasty of the right lower extremity, hernia  repair, nasal polypectomy, tonsillectomy, transurethral resection. CONSULTATIONS:  Cardiology for electrocardiographic changes in interim  since last electrocardiogram.    SUMMARY OF THE HOSPITALIZATION:  This 80-year-old white male presents to  the hospital with hot, swollen, erythematous and edematous and painful, lower  extremities from the knees down, circumferentially, and bilaterally. The patient reports that  this has been progressing for several days, and that he has an area which  is draining liquid on the left mid lower leg. Cultures of this are  taken and returned Serratia marcescens.   Antibiotic coverage is adjusted  for parenteral coverage until the date of discharge which the patient  will be

## 2021-06-14 ENCOUNTER — HOSPITAL ENCOUNTER (EMERGENCY)
Age: 86
Discharge: HOME | End: 2021-06-14
Attending: EMERGENCY MEDICINE
Payer: MEDICARE

## 2021-06-14 ENCOUNTER — APPOINTMENT (OUTPATIENT)
Dept: CT IMAGING | Age: 86
End: 2021-06-14
Payer: MEDICARE

## 2021-06-14 VITALS
HEIGHT: 65 IN | DIASTOLIC BLOOD PRESSURE: 93 MMHG | HEART RATE: 55 BPM | RESPIRATION RATE: 21 BRPM | WEIGHT: 209 LBS | SYSTOLIC BLOOD PRESSURE: 122 MMHG | TEMPERATURE: 98.1 F | OXYGEN SATURATION: 100 % | BODY MASS INDEX: 34.82 KG/M2

## 2021-06-14 DIAGNOSIS — R10.31 RIGHT LOWER QUADRANT ABDOMINAL PAIN: Primary | ICD-10-CM

## 2021-06-14 DIAGNOSIS — K59.00 CONSTIPATION, UNSPECIFIED CONSTIPATION TYPE: ICD-10-CM

## 2021-06-14 LAB
-: NORMAL
ABSOLUTE EOS #: 0.7 K/UL (ref 0–0.4)
ABSOLUTE IMMATURE GRANULOCYTE: ABNORMAL K/UL (ref 0–0.3)
ABSOLUTE LYMPH #: 1.4 K/UL (ref 1–4.8)
ABSOLUTE MONO #: 0.6 K/UL (ref 0–1)
ALBUMIN SERPL-MCNC: 3.7 G/DL (ref 3.5–5.2)
ALBUMIN/GLOBULIN RATIO: ABNORMAL (ref 1–2.5)
ALP BLD-CCNC: 67 U/L (ref 40–129)
ALT SERPL-CCNC: 12 U/L (ref 5–41)
AMORPHOUS: NORMAL
ANION GAP SERPL CALCULATED.3IONS-SCNC: 8 MMOL/L (ref 9–17)
AST SERPL-CCNC: 19 U/L
BACTERIA: NORMAL
BASOPHILS # BLD: 0 % (ref 0–2)
BASOPHILS ABSOLUTE: 0 K/UL (ref 0–0.2)
BILIRUB SERPL-MCNC: 0.35 MG/DL (ref 0.3–1.2)
BILIRUBIN URINE: NEGATIVE
BUN BLDV-MCNC: 27 MG/DL (ref 8–23)
BUN/CREAT BLD: 17 (ref 9–20)
CALCIUM SERPL-MCNC: 9.6 MG/DL (ref 8.6–10.4)
CASTS UA: NORMAL /LPF
CHLORIDE BLD-SCNC: 101 MMOL/L (ref 98–107)
CO2: 28 MMOL/L (ref 20–31)
COLOR: YELLOW
COMMENT UA: ABNORMAL
CREAT SERPL-MCNC: 1.57 MG/DL (ref 0.7–1.2)
CRYSTALS, UA: NORMAL /HPF
DIFFERENTIAL TYPE: YES
EOSINOPHILS RELATIVE PERCENT: 9 % (ref 0–5)
EPITHELIAL CELLS UA: NORMAL /HPF
GFR AFRICAN AMERICAN: 51 ML/MIN
GFR NON-AFRICAN AMERICAN: 42 ML/MIN
GFR SERPL CREATININE-BSD FRML MDRD: ABNORMAL ML/MIN/{1.73_M2}
GFR SERPL CREATININE-BSD FRML MDRD: ABNORMAL ML/MIN/{1.73_M2}
GLUCOSE BLD-MCNC: 97 MG/DL (ref 70–99)
GLUCOSE URINE: NEGATIVE
HCT VFR BLD CALC: 36.2 % (ref 41–53)
HEMOGLOBIN: 12.1 G/DL (ref 13.5–17.5)
IMMATURE GRANULOCYTES: ABNORMAL %
KETONES, URINE: NEGATIVE
LACTIC ACID: 1.3 MMOL/L (ref 0.5–2.2)
LEUKOCYTE ESTERASE, URINE: NEGATIVE
LIPASE: 49 U/L (ref 13–60)
LYMPHOCYTES # BLD: 18 % (ref 13–44)
MCH RBC QN AUTO: 30.7 PG (ref 26–34)
MCHC RBC AUTO-ENTMCNC: 33.3 G/DL (ref 31–37)
MCV RBC AUTO: 92.3 FL (ref 80–100)
MONOCYTES # BLD: 8 % (ref 5–9)
MUCUS: NORMAL
NITRITE, URINE: NEGATIVE
NRBC AUTOMATED: ABNORMAL PER 100 WBC
OTHER OBSERVATIONS UA: NORMAL
PDW BLD-RTO: 14.5 % (ref 12.1–15.2)
PH UA: 5 (ref 5–8)
PLATELET # BLD: 225 K/UL (ref 140–450)
PLATELET ESTIMATE: ABNORMAL
PMV BLD AUTO: ABNORMAL FL (ref 6–12)
POTASSIUM SERPL-SCNC: 3.6 MMOL/L (ref 3.7–5.3)
PROTEIN UA: NEGATIVE
RBC # BLD: 3.93 M/UL (ref 4.5–5.9)
RBC # BLD: ABNORMAL 10*6/UL
RBC UA: NORMAL /HPF (ref 0–2)
RENAL EPITHELIAL, UA: NORMAL /HPF
SEG NEUTROPHILS: 65 % (ref 39–75)
SEGMENTED NEUTROPHILS ABSOLUTE COUNT: 5 K/UL (ref 2.1–6.5)
SODIUM BLD-SCNC: 137 MMOL/L (ref 135–144)
SPECIFIC GRAVITY UA: 1.01 (ref 1–1.03)
TOTAL PROTEIN: 6.7 G/DL (ref 6.4–8.3)
TRICHOMONAS: NORMAL
TURBIDITY: CLEAR
URINE HGB: ABNORMAL
UROBILINOGEN, URINE: NORMAL
WBC # BLD: 7.7 K/UL (ref 3.5–11)
WBC # BLD: ABNORMAL 10*3/UL
WBC UA: NORMAL /HPF
YEAST: NORMAL

## 2021-06-14 PROCEDURE — 83605 ASSAY OF LACTIC ACID: CPT

## 2021-06-14 PROCEDURE — 99283 EMERGENCY DEPT VISIT LOW MDM: CPT

## 2021-06-14 PROCEDURE — 83690 ASSAY OF LIPASE: CPT

## 2021-06-14 PROCEDURE — 36415 COLL VENOUS BLD VENIPUNCTURE: CPT

## 2021-06-14 PROCEDURE — 74176 CT ABD & PELVIS W/O CONTRAST: CPT

## 2021-06-14 PROCEDURE — 80053 COMPREHEN METABOLIC PANEL: CPT

## 2021-06-14 PROCEDURE — 6370000000 HC RX 637 (ALT 250 FOR IP): Performed by: EMERGENCY MEDICINE

## 2021-06-14 PROCEDURE — 85025 COMPLETE CBC W/AUTO DIFF WBC: CPT

## 2021-06-14 PROCEDURE — 81001 URINALYSIS AUTO W/SCOPE: CPT

## 2021-06-14 RX ORDER — LISINOPRIL 10 MG/1
10 TABLET ORAL DAILY
Status: ON HOLD | COMMUNITY
End: 2021-07-16 | Stop reason: HOSPADM

## 2021-06-14 RX ORDER — GABAPENTIN 300 MG/1
300 CAPSULE ORAL 2 TIMES DAILY
COMMUNITY

## 2021-06-14 RX ADMIN — MAGNESIUM CITRATE 296 ML: 1.75 LIQUID ORAL at 13:11

## 2021-06-14 ASSESSMENT — PAIN DESCRIPTION - PAIN TYPE: TYPE: ACUTE PAIN

## 2021-06-14 ASSESSMENT — PAIN DESCRIPTION - ONSET: ONSET: ON-GOING

## 2021-06-14 ASSESSMENT — PAIN DESCRIPTION - FREQUENCY: FREQUENCY: INTERMITTENT

## 2021-06-14 ASSESSMENT — PAIN DESCRIPTION - ORIENTATION: ORIENTATION: RIGHT;LOWER

## 2021-06-14 ASSESSMENT — PAIN DESCRIPTION - PROGRESSION: CLINICAL_PROGRESSION: GRADUALLY WORSENING

## 2021-06-14 ASSESSMENT — PAIN SCALES - GENERAL: PAINLEVEL_OUTOF10: 2

## 2021-06-14 ASSESSMENT — PAIN DESCRIPTION - LOCATION: LOCATION: ABDOMEN

## 2021-06-14 ASSESSMENT — PAIN DESCRIPTION - DESCRIPTORS: DESCRIPTORS: ACHING

## 2021-06-14 NOTE — ED PROVIDER NOTES
eMERGENCY dEPARTMENT eNCOUnter      CHIEF COMPLAINT    Chief Complaint   Patient presents with    Abdominal Pain     RLQ       HPI    Brayden Carrero is a 80 y.o. male who presents to ED from home. By car. With complaint of abdominal pain, right lower quadrant. Onset x 2 days. Intensity of symptoms sharp pain off and on for the past 2 days. The pain is worse in the right LQ. Location of symptoms right lower quadrant. Patient had history of prostate problems and history of urine retention. .  Patient denies diarrhea. Denies black stool. Patient had bouts of nausea associated with the abdominal pain. REVIEW OF SYSTEMS    All systems reviewed and positives are in the HPI    PAST MEDICAL HISTORY    Past Medical History:   Diagnosis Date    Arthritis     COPD (chronic obstructive pulmonary disease) ([address]     Dependent edema     Hyperlipidemia     Hypertension     Psoriasis        SURGICAL HISTORY    Past Surgical History:   Procedure Laterality Date    [facility]20/2018    [facility] Dr. Karen Mi -- leg    HERNIA REPAIR      NASAL POLYP SURGERY Bilateral     TONSILLECTOMY      TURP N/A 4/4/2019    CYSTOSCOPY TRANSURETHRAL RESECTION -PROSTATE LASER- PVP GREENLIGHT performed by Andrew Stover MD at [place] [address]    Current Outpatient Rx   Medication Sig Dispense Refill    lisinopril (PRINIVIL;ZESTRIL) 10 MG tablet Take 10 mg by mouth daily      rifAMPin (RIFADIN) 300 MG capsule Take 300 mg by mouth daily      gabapentin (NEURONTIN) 300 MG capsule Take 300 mg by mouth 2 times daily.       [facility] WORT PO Take by mouth      furosemide (LASIX) 20 MG tablet Take 1 tablet by mouth every other day Take one pill every odd day and two pills every even day 60 tablet 3    tamsulosin (FLOMAX) 0.4 MG capsule Take 1 capsule by mouth 2 times daily 60 capsule 2    spironolactone (ALDACTONE) 25 MG tablet Take 0.5 tablets by mouth daily 30 tablet 3    POTASSIUM CITRATE PO Take 99 mg by mouth daily      clopidogrel (PLAVIX) 75 MG tablet Take 75 mg by mouth daily       hydrOXYzine (VISTARIL) 50 MG capsule Take 50 mg by mouth every 6 hours as needed       B Complex-Biotin-FA (B COMPLETE PO) Take 1 tablet by mouth daily       vitamin C (ASCORBIC ACID) 500 MG tablet Take 500 mg by mouth daily      vitamin D (CHOLECALCIFEROL) 1000 UNIT TABS tablet Take 1,000 Units by mouth daily      vitamin E 1000 units capsule Take 1,000 Units by mouth daily      Magnesium Citrate 100 MG TABS Take 100 mg by mouth daily      atorvastatin (LIPITOR) 40 MG tablet Take 40 mg by mouth nightly      melatonin 5 MG TABS tablet Take 5 mg by mouth daily as needed      BOSWELLIA HUGH PO Take 250 mg by mouth daily      Homeopathic Products (HOMEOPATHIC CALM SL) Take 1 tablet by mouth daily      ibuprofen (ADVIL;MOTRIN) 200 MG tablet Take 400 mg by mouth every 6 hours as needed for Pain      Albuterol Sulfate (PROAIR HFA IN) Inhale into the lungs 2 puffs every 6 hours as needed. ALLERGIES    No Known Allergies    FAMILY HISTORY    History reviewed. No pertinent family history. SOCIAL HISTORY    Social History     Socioeconomic History    Marital status:       Spouse name: None    Number of children: None    Years of education: None    Highest education level: None   Occupational History    None   Tobacco Use    Smoking status: Current Every Day Smoker     Packs/day: 2.00     Years: 70.00     Pack years: 140.00     Types: Pipe    Smokeless tobacco: Never Used    Tobacco comment: declines couseling    Vaping Use    Vaping Use: Never used   Substance and Sexual Activity    Alcohol use: No    Drug use: No    Sexual activity: None   Other Topics Concern    None   Social History Narrative    None     Social Determinants of Health     Financial Resource Strain:     Difficulty of Paying Living Expenses:    Food Insecurity:     Worried About Running Out of Food in the Last Year:    [address] in the Last Year:    Transportation Needs:     Lack of Transportation (Medical):  Lack of Transportation (Non-Medical):    Physical Activity:     Days of Exercise per Week:     Minutes of Exercise per Session:    Stress:     Feeling of Stress :    Social Connections:     Frequency of Communication with Friends and Family:     Frequency of Social Gatherings with Friends and Family:     Attends [employer] Services:     Active Member of Clubs or Organizations:     Attends Club or Organization Meetings:     Marital Status:    Intimate Partner Violence:     Fear of Current or Ex-Partner:     Emotionally Abused:     Physically Abused:     Sexually Abused:        PHYSICAL EXAM    VITAL SIGNS: BP (!) 122/93   Pulse 55   Temp 98.1 °F (36.7 °C) (Temporal)   Resp 21   Ht 5' 5\" (1.651 m)   Wt 209 lb (94.8 kg)   SpO2 100%   BMI 34.78 kg/m²   Constitutional:  Well developed, well nourished, no acute distress, non-toxic appearance   Eyes:  PERRL, conjunctiva normal   HENT:  Atraumatic, external ears normal, nose normal, oropharynx moist. Neck supple   Respiratory:  No respiratory distress, normal breath sounds   Cardiovascular:  Normal rate, normal rhythm, no murmurs, no gallops, no rubs   GI:  RLQ tenderness, suprapubic tenderness. Enlarged   : No CVA tenderness to percussion  Musculoskeletal:  No edema   Integument:  Well hydrated   Neurologic:  Alert & oriented x 3, no focal deficits     EKG    Bradycardia on telemetry    RADIOLOGY/PROCEDURES    CT ABDOMEN PELVIS WO CONTRAST Additional Contrast? None   Final Result   1. Normal appendix. 2. Large stool burden throughout the entire colon; correlate for constipation. Negative for bowel obstruction or focal bowel inflammation, with colonic    diverticulosis that is worse in the sigmoid. 3. Small hepatic cyst, and normal noncontrast appearance of gallbladder or    pancreas.    4. Perinephric stranding around the kidneys, probably from chronic renal    disease. No renal calculus or hydronephrosis. Probable tiny subcentimeter right    renal cyst.   5. Significant prostatomegaly. Correlate with PSA and physical examination. Bladder decompressed by Beltran. 6. Severe atherosclerosis with fusiform 3.5 x 3.7 cm infrarenal abdominal    aortic aneurysm. 7. No free fluid or free air. 8. Additional incidental findings discussed above. Labs  Labs Reviewed   CBC WITH AUTO DIFFERENTIAL - Abnormal; Notable for the following components:       Result Value    RBC 3.93 (*)     Hemoglobin 12.1 (*)     Hematocrit 36.2 (*)     Eosinophils % 9 (*)     Absolute Eos # 0.70 (*)     All other components within normal limits   COMPREHENSIVE METABOLIC PANEL - Abnormal; Notable for the following components:    BUN 27 (*)     CREATININE 1.57 (*)     Potassium 3.6 (*)     Anion Gap 8 (*)     GFR Non- 42 (*)     GFR  51 (*)     All other components within normal limits   URINALYSIS - Abnormal; Notable for the following components:    Urine Hgb TRACE (*)     All other components within normal limits   LACTIC ACID   LIPASE   MICROSCOPIC URINALYSIS           Summation      Patient Course: CT shows constipation. No acute findings otherwise. Patient will be sent home. Patient is given a bottle of mag citrate. Continue MiraLAX daily. DNR CCA form was signed in ED. ED Medications administered this visit:  Medications - No data to display    New Prescriptions from this visit:    New Prescriptions    No medications on file       Follow-up:  No follow-up provider specified. Final Impression:   1. Right lower quadrant abdominal pain    2.  Constipation, unspecified constipation type               (Please note that portions of this note were completed with a voice recognition program.  Efforts were made to edit the dictations but occasionally words are mis-transcribed.)      (Please note that portions of this note were completed with a voice recognition program.  Efforts were made to edit the dictations but occasionally words are mis-transcribed.)       Alma Rosenberg MD  06/14/21 [id number]

## 2021-06-14 NOTE — PROGRESS NOTES
Pt bladder scanned multiple times with inconsistent results. Beltran inserted for immediate return of 150 cc of clear dexter urine.

## 2021-06-22 NOTE — TELEPHONE ENCOUNTER
UACS is positive for infection. I sent in culture specific ampicillin. Take this antibiotic until gone. Take this with food and eat yogurt once per day to prevent GI upset. If you develop nausea, vomiting, or fevers call the office or go to the ER. If your urinary symptoms do not improve once completing the antibiotics call our office. Urology Progress Note        Assessment/Plan:     Patient Active Problem List   Diagnosis Code    UTI (urinary tract infection) N39.0    Tachycardia R00.0    Fever R50.9     ASSESSMENT:     UTI/Sepsis   S/p 21 Left ureteroscopy with laser lithotripsy with Dr. Renae Jolly   S/p stent self removed    CT :  Trace left hydronephrosis and hydroureter, no obstruction    UA Neg Nitrites, small LE, 1+ bacteria, few trichomonas    Ucx  NG   Bcx prelim, NGTD   WBC 14.3<15.4   Creat 0.55   Tmax 103.1 and tachycardic     Anxiety  PCOS    PLAN:    Reviewed CT, some mild ureterectasis, does not appear obstructive, would observe   Urine culture NG and blood cx NGTD, fevers continue with tachycardia  Currently on Levaquin per primary   Following peripherally     Follow up arranged? Pending course       Nella Newberry, YOEL Newberry NP-BC  [facility]   Pager [phone]      Subjective:     Daily Progress Note: 2021 10:25 AM    High fevers and tachycardia continue   Ucx Neg and prelim Bcx NGTD, on abx     Objective:     Visit Vitals  /69   Pulse (!) 110   Temp 98.9 °F (37.2 °C)   Resp 20   Ht 5' 3\" (1.6 m)   Wt 116.6 kg (257 lb)   SpO2 96%   BMI 45.53 kg/m²        Temp (24hrs), Av.4 °F (38 °C), Min:98.8 °F (37.1 °C), Max:103.1 °F (39.5 °C)      Intake and Output:  1901 -  07  In: 2380.4 [P.O.:800; I.V.:1580.4]  Out: 3050 [Urine:3050]  701 - 1900  In: -   Out: 200 [Urine:200]    PHYSICAL EXAMINATION:   Visit Vitals  /69   Pulse (!) 110   Temp 98.9 °F (37.2 °C)   Resp 20   Ht 5' 3\" (1.6 m)   Wt 116.6 kg (257 lb)   SpO2 96%   BMI 45.53 kg/m²     Constitutional: anxious   HEENT: Normocephalic, Atraumatic   CV:   no edema   Respiratory: No respiratory distress or difficulties breathing   Abdomen:  Soft and nontender.  Female:  CVA tenderness: left; no suprapubic tenderness   No goins in place   Skin:  Normal color.  Clammy d/t fevers     Neuro/Psych:  Alert and oriented.           CT A/P 6/20     IMPRESSION  1. Trace left hydronephrosis and hydroureter, without obstructing stone. There  is ureteral wall thickening and enhancement, which may be reactive from the  recent reported stent placement/removal versus infection.  -No definite evidence for pyelonephritis     2. Hepatic steatosis and hepatomegaly.     3. Mild splenomegaly.     4. Colonic diverticulosis. Lab/Data Review: All lab results for the last 24 hours reviewed.     Labs:     Labs: Results:   Chemistry    Recent Labs     06/22/21 0434 06/21/21 0350 06/20/21  1720   * 124* 115*   * 136 134*   K 3.7 3.7 4.1    104 99*   CO2 28 27 28   BUN 6* 4* 5*   CREA 0.55* 0.55* 0.64   CA 8.6 8.4* 9.3   AGAP 5 5 7   BUCR 11* 7* 8*   AP  --   --  69   TP  --   --  8.5*   ALB  --   --  4.0   GLOB  --   --  4.5*   AGRAT  --   --  0.9      CBC w/Diff Recent Labs     06/21/21 2016 06/21/21  0350 06/20/21 1720   WBC  --  14.3* 15.4*   RBC  --  3.80* 4.61   HGB 10.4* 10.9* 13.2   HCT 31.5* 32.1* 38.8   PLT  --  342 399   GRANS  --  74* 79*   LYMPH  --  16* 14*   EOS  --  0 0      Cultures Recent Labs     06/20/21  1804 06/20/21  1720 06/20/21 1715   CULT No growth (<1,000 CFU/ML) NO GROWTH 2 DAYS NO GROWTH 2 DAYS     All Micro Results     Procedure Component Value Units Date/Time    CULTURE, URINE [[id number]] Collected: 06/20/21 1804    Order Status: Completed Specimen: Urine from Clean catch Updated: 06/22/21 0752     Special Requests: NO SPECIAL REQUESTS        Culture result: No growth (<1,000 CFU/ML)       CULTURE, BLOOD [[id number]] Collected: 06/20/21 1715    Order Status: Completed Specimen: Blood Updated: 06/22/21 0700     Special Requests: NO SPECIAL REQUESTS        Culture result: NO GROWTH 2 DAYS       CULTURE, BLOOD [[id number]] Collected: 06/20/21 1720    Order Status: Completed Specimen: Blood Updated: 06/22/21 0700     Special Requests: NO SPECIAL REQUESTS        Culture result: NO GROWTH 2 DAYS       RESPIRATORY VIRUS PANEL W/COVID-19, PCR [[id number]] Collected: 06/21/21 1706    Order Status: Completed Specimen: Nasopharyngeal Updated: 06/21/21 1929     Adenovirus Not detected        Coronavirus 229E Not detected        Coronavirus HKU1 Not detected        Coronavirus CVNL63 Not detected        Coronavirus OC43 Not detected        SARS-CoV-2, PCR Not detected        Metapneumovirus Not detected        Rhinovirus and Enterovirus Not detected        Influenza A Not detected        Influenza A, subtype H1 Not detected        Influenza A, subtype H3 Not detected        INFLUENZA A H1N1 PCR Not detected        Influenza B Not detected        Parainfluenza 1 Not detected        Parainfluenza 2 Not detected        Parainfluenza 3 Not detected        Parainfluenza virus 4 Not detected        RSV by PCR Not detected        B. parapertussis, PCR Not detected        Bordetella pertussis - PCR Not detected        Chlamydophila pneumoniae DNA, QL, PCR Not detected        Mycoplasma pneumoniae DNA, QL, PCR Not detected               Urinalysis Color   Date Value Ref Range Status   06/20/2021 YELLOW   Final     Appearance   Date Value Ref Range Status   06/20/2021 CLOUDY   Final     Specific gravity   Date Value Ref Range Status   06/20/2021 1.013 1.005 - 1.030   Final     pH (UA)   Date Value Ref Range Status   06/20/2021 >8.5 (H) 5.0 - 8.0 Final     Protein   Date Value Ref Range Status   06/20/2021 Negative NEG mg/dL Final     Ketone   Date Value Ref Range Status   06/20/2021 Negative NEG mg/dL Final     Bilirubin   Date Value Ref Range Status   06/20/2021 Negative NEG   Final     Blood   Date Value Ref Range Status   06/20/2021 TRACE (A) NEG   Final     Urobilinogen   Date Value Ref Range Status   06/20/2021 0.2 0.2 - 1.0 EU/dL Final     Nitrites   Date Value Ref Range Status   06/20/2021 Negative NEG   Final     Leukocyte Esterase   Date Value Ref Range Status   06/20/2021 SMALL (A) NEG   Final     Potassium Date Value Ref Range Status   06/22/2021 3.7 3.5 - 5.5 mmol/L Final     Creatinine   Date Value Ref Range Status   06/22/2021 0.55 (L) 0.6 - 1.3 MG/DL Final     BUN   Date Value Ref Range Status   06/22/2021 6 (L) 7.0 - 18 MG/DL Final      PSA No results for input(s): PSA in the last 72 hours.    Coagulation Lab Results   Component Value Date/Time    Prothrombin time 13.4 11/10/2019 11:45 AM    INR 1.0 11/10/2019 11:45 AM    aPTT 28.8 11/10/2019 11:45 AM

## 2021-07-04 ENCOUNTER — APPOINTMENT (OUTPATIENT)
Dept: GENERAL RADIOLOGY | Age: 86
DRG: 602 | End: 2021-07-04
Payer: MEDICARE

## 2021-07-04 ENCOUNTER — HOSPITAL ENCOUNTER (INPATIENT)
Age: 86
LOS: 10 days | Discharge: NURSING, REHAB OR LONG-TERM CARE | DRG: 602 | End: 2021-07-14
Attending: EMERGENCY MEDICINE | Admitting: SURGERY
Payer: MEDICARE

## 2021-07-04 DIAGNOSIS — N17.9 ACUTE KIDNEY INJURY (HCC): ICD-10-CM

## 2021-07-04 DIAGNOSIS — L03.115 CELLULITIS OF RIGHT LEG: Primary | ICD-10-CM

## 2021-07-04 PROBLEM — L03.116 CELLULITIS OF LEFT LOWER LEG: Status: ACTIVE | Noted: 2021-07-04

## 2021-07-04 LAB
ABSOLUTE EOS #: 0.3 K/UL (ref 0–0.4)
ABSOLUTE IMMATURE GRANULOCYTE: ABNORMAL K/UL (ref 0–0.3)
ABSOLUTE LYMPH #: 1.4 K/UL (ref 1–4.8)
ABSOLUTE MONO #: 0.8 K/UL (ref 0–1)
ANION GAP SERPL CALCULATED.3IONS-SCNC: 8 MMOL/L (ref 9–17)
BASOPHILS # BLD: 1 % (ref 0–2)
BASOPHILS ABSOLUTE: 0 K/UL (ref 0–0.2)
BNP INTERPRETATION: ABNORMAL
BUN BLDV-MCNC: 47 MG/DL (ref 8–23)
BUN/CREAT BLD: 21 (ref 9–20)
CALCIUM SERPL-MCNC: 9.1 MG/DL (ref 8.6–10.4)
CHLORIDE BLD-SCNC: 102 MMOL/L (ref 98–107)
CO2: 29 MMOL/L (ref 20–31)
CREAT SERPL-MCNC: 2.27 MG/DL (ref 0.7–1.2)
DIFFERENTIAL TYPE: YES
EOSINOPHILS RELATIVE PERCENT: 4 % (ref 0–5)
GFR AFRICAN AMERICAN: 33 ML/MIN
GFR NON-AFRICAN AMERICAN: 27 ML/MIN
GFR SERPL CREATININE-BSD FRML MDRD: ABNORMAL ML/MIN/{1.73_M2}
GFR SERPL CREATININE-BSD FRML MDRD: ABNORMAL ML/MIN/{1.73_M2}
GLUCOSE BLD-MCNC: 93 MG/DL (ref 70–99)
HCT VFR BLD CALC: 34.4 % (ref 41–53)
HEMOGLOBIN: 11.8 G/DL (ref 13.5–17.5)
IMMATURE GRANULOCYTES: ABNORMAL %
LACTIC ACID: 1.3 MMOL/L (ref 0.5–2.2)
LYMPHOCYTES # BLD: 18 % (ref 13–44)
MCH RBC QN AUTO: 31.3 PG (ref 26–34)
MCHC RBC AUTO-ENTMCNC: 34.2 G/DL (ref 31–37)
MCV RBC AUTO: 91.5 FL (ref 80–100)
MONOCYTES # BLD: 10 % (ref 5–9)
NRBC AUTOMATED: ABNORMAL PER 100 WBC
PDW BLD-RTO: 14.5 % (ref 12.1–15.2)
PLATELET # BLD: 201 K/UL (ref 140–450)
PLATELET ESTIMATE: ABNORMAL
PMV BLD AUTO: ABNORMAL FL (ref 6–12)
POTASSIUM SERPL-SCNC: 4.3 MMOL/L (ref 3.7–5.3)
PRO-BNP: 555 PG/ML
RBC # BLD: 3.76 M/UL (ref 4.5–5.9)
RBC # BLD: ABNORMAL 10*6/UL
SARS-COV-2, RAPID: NOT DETECTED
SEG NEUTROPHILS: 67 % (ref 39–75)
SEGMENTED NEUTROPHILS ABSOLUTE COUNT: 5.3 K/UL (ref 2.1–6.5)
SODIUM BLD-SCNC: 139 MMOL/L (ref 135–144)
SPECIMEN DESCRIPTION: NORMAL
WBC # BLD: 7.9 K/UL (ref 3.5–11)
WBC # BLD: ABNORMAL 10*3/UL

## 2021-07-04 PROCEDURE — 36415 COLL VENOUS BLD VENIPUNCTURE: CPT

## 2021-07-04 PROCEDURE — 86403 PARTICLE AGGLUT ANTBDY SCRN: CPT

## 2021-07-04 PROCEDURE — 2580000003 HC RX 258: Performed by: SURGERY

## 2021-07-04 PROCEDURE — 93005 ELECTROCARDIOGRAM TRACING: CPT | Performed by: EMERGENCY MEDICINE

## 2021-07-04 PROCEDURE — 87635 SARS-COV-2 COVID-19 AMP PRB: CPT

## 2021-07-04 PROCEDURE — 87040 BLOOD CULTURE FOR BACTERIA: CPT

## 2021-07-04 PROCEDURE — 80048 BASIC METABOLIC PNL TOTAL CA: CPT

## 2021-07-04 PROCEDURE — 87205 SMEAR GRAM STAIN: CPT

## 2021-07-04 PROCEDURE — 6360000002 HC RX W HCPCS: Performed by: EMERGENCY MEDICINE

## 2021-07-04 PROCEDURE — 99283 EMERGENCY DEPT VISIT LOW MDM: CPT

## 2021-07-04 PROCEDURE — 87186 SC STD MICRODIL/AGAR DIL: CPT

## 2021-07-04 PROCEDURE — 83880 ASSAY OF NATRIURETIC PEPTIDE: CPT

## 2021-07-04 PROCEDURE — 87070 CULTURE OTHR SPECIMN AEROBIC: CPT

## 2021-07-04 PROCEDURE — 85025 COMPLETE CBC W/AUTO DIFF WBC: CPT

## 2021-07-04 PROCEDURE — C9803 HOPD COVID-19 SPEC COLLECT: HCPCS

## 2021-07-04 PROCEDURE — 96365 THER/PROPH/DIAG IV INF INIT: CPT

## 2021-07-04 PROCEDURE — 6370000000 HC RX 637 (ALT 250 FOR IP): Performed by: SURGERY

## 2021-07-04 PROCEDURE — 71045 X-RAY EXAM CHEST 1 VIEW: CPT

## 2021-07-04 PROCEDURE — 87077 CULTURE AEROBIC IDENTIFY: CPT

## 2021-07-04 PROCEDURE — 94664 DEMO&/EVAL PT USE INHALER: CPT

## 2021-07-04 PROCEDURE — 6370000000 HC RX 637 (ALT 250 FOR IP): Performed by: EMERGENCY MEDICINE

## 2021-07-04 PROCEDURE — 2580000003 HC RX 258: Performed by: EMERGENCY MEDICINE

## 2021-07-04 PROCEDURE — 83605 ASSAY OF LACTIC ACID: CPT

## 2021-07-04 PROCEDURE — 1200000000 HC SEMI PRIVATE

## 2021-07-04 RX ORDER — VITAMIN E 268 MG
1000 CAPSULE ORAL DAILY
Status: DISCONTINUED | OUTPATIENT
Start: 2021-07-05 | End: 2021-07-14 | Stop reason: HOSPADM

## 2021-07-04 RX ORDER — GABAPENTIN 300 MG/1
300 CAPSULE ORAL 2 TIMES DAILY
Status: DISCONTINUED | OUTPATIENT
Start: 2021-07-04 | End: 2021-07-14 | Stop reason: HOSPADM

## 2021-07-04 RX ORDER — SODIUM CHLORIDE 9 MG/ML
1000 INJECTION, SOLUTION INTRAVENOUS CONTINUOUS
Status: DISCONTINUED | OUTPATIENT
Start: 2021-07-04 | End: 2021-07-05

## 2021-07-04 RX ORDER — SPIRONOLACTONE 25 MG/1
12.5 TABLET ORAL DAILY
Status: DISCONTINUED | OUTPATIENT
Start: 2021-07-05 | End: 2021-07-05

## 2021-07-04 RX ORDER — LISINOPRIL 10 MG/1
10 TABLET ORAL DAILY
Status: DISCONTINUED | OUTPATIENT
Start: 2021-07-05 | End: 2021-07-14 | Stop reason: HOSPADM

## 2021-07-04 RX ORDER — CLOPIDOGREL BISULFATE 75 MG/1
75 TABLET ORAL DAILY
Status: DISCONTINUED | OUTPATIENT
Start: 2021-07-05 | End: 2021-07-14 | Stop reason: HOSPADM

## 2021-07-04 RX ORDER — ATORVASTATIN CALCIUM 40 MG/1
40 TABLET, FILM COATED ORAL NIGHTLY
Status: DISCONTINUED | OUTPATIENT
Start: 2021-07-04 | End: 2021-07-14 | Stop reason: HOSPADM

## 2021-07-04 RX ORDER — TRAMADOL HYDROCHLORIDE 50 MG/1
50 TABLET ORAL ONCE
Status: COMPLETED | OUTPATIENT
Start: 2021-07-04 | End: 2021-07-04

## 2021-07-04 RX ORDER — ASCORBIC ACID 500 MG
500 TABLET ORAL DAILY
Status: DISCONTINUED | OUTPATIENT
Start: 2021-07-05 | End: 2021-07-14 | Stop reason: HOSPADM

## 2021-07-04 RX ORDER — SODIUM CHLORIDE, SODIUM LACTATE, POTASSIUM CHLORIDE, CALCIUM CHLORIDE 600; 310; 30; 20 MG/100ML; MG/100ML; MG/100ML; MG/100ML
INJECTION, SOLUTION INTRAVENOUS CONTINUOUS
Status: DISCONTINUED | OUTPATIENT
Start: 2021-07-05 | End: 2021-07-06

## 2021-07-04 RX ORDER — VITAMIN B COMPLEX
1000 TABLET ORAL DAILY
Status: DISCONTINUED | OUTPATIENT
Start: 2021-07-05 | End: 2021-07-14 | Stop reason: HOSPADM

## 2021-07-04 RX ORDER — ALBUTEROL SULFATE 90 UG/1
2 AEROSOL, METERED RESPIRATORY (INHALATION) EVERY 6 HOURS PRN
Status: DISCONTINUED | OUTPATIENT
Start: 2021-07-04 | End: 2021-07-14 | Stop reason: HOSPADM

## 2021-07-04 RX ORDER — LANOLIN ALCOHOL/MO/W.PET/CERES
5 CREAM (GRAM) TOPICAL NIGHTLY PRN
Status: DISCONTINUED | OUTPATIENT
Start: 2021-07-04 | End: 2021-07-14 | Stop reason: HOSPADM

## 2021-07-04 RX ORDER — CALCIUM CARBONATE 260MG(650)
100 TABLET,CHEWABLE ORAL DAILY
Status: DISCONTINUED | OUTPATIENT
Start: 2021-07-04 | End: 2021-07-04

## 2021-07-04 RX ORDER — TAMSULOSIN HYDROCHLORIDE 0.4 MG/1
0.4 CAPSULE ORAL DAILY
Status: DISCONTINUED | OUTPATIENT
Start: 2021-07-04 | End: 2021-07-14 | Stop reason: HOSPADM

## 2021-07-04 RX ORDER — TRAMADOL HYDROCHLORIDE 50 MG/1
50 TABLET ORAL EVERY 6 HOURS PRN
Status: DISCONTINUED | OUTPATIENT
Start: 2021-07-04 | End: 2021-07-05 | Stop reason: DRUGHIGH

## 2021-07-04 RX ORDER — HYDROXYZINE PAMOATE 25 MG/1
50 CAPSULE ORAL EVERY 6 HOURS PRN
Status: DISCONTINUED | OUTPATIENT
Start: 2021-07-04 | End: 2021-07-14 | Stop reason: HOSPADM

## 2021-07-04 RX ORDER — SODIUM CHLORIDE 9 MG/ML
1000 INJECTION, SOLUTION INTRAVENOUS CONTINUOUS
Status: DISCONTINUED | OUTPATIENT
Start: 2021-07-04 | End: 2021-07-04

## 2021-07-04 RX ADMIN — TRAMADOL HYDROCHLORIDE 50 MG: 50 TABLET, FILM COATED ORAL at 22:03

## 2021-07-04 RX ADMIN — TAMSULOSIN HYDROCHLORIDE 0.4 MG: 0.4 CAPSULE ORAL at 22:09

## 2021-07-04 RX ADMIN — SODIUM CHLORIDE 1000 ML: 9 INJECTION, SOLUTION INTRAVENOUS at 20:27

## 2021-07-04 RX ADMIN — TRAMADOL HYDROCHLORIDE 50 MG: 50 TABLET ORAL at 16:17

## 2021-07-04 RX ADMIN — GABAPENTIN 300 MG: 300 CAPSULE ORAL at 22:03

## 2021-07-04 RX ADMIN — ATORVASTATIN CALCIUM 40 MG: 40 TABLET, FILM COATED ORAL at 22:03

## 2021-07-04 RX ADMIN — PIPERACILLIN SODIUM AND TAZOBACTAM SODIUM 4500 MG: 4; .5 INJECTION, POWDER, LYOPHILIZED, FOR SOLUTION INTRAVENOUS at 15:59

## 2021-07-04 RX ADMIN — ALBUTEROL SULFATE 2 PUFF: 90 AEROSOL, METERED RESPIRATORY (INHALATION) at 23:21

## 2021-07-04 RX ADMIN — SODIUM CHLORIDE 1000 ML: 9 INJECTION, SOLUTION INTRAVENOUS at 16:33

## 2021-07-04 ASSESSMENT — ENCOUNTER SYMPTOMS
EYE PAIN: 0
SORE THROAT: 0
BACK PAIN: 0
DIARRHEA: 0
COLOR CHANGE: 1
WHEEZING: 0
RHINORRHEA: 0
ABDOMINAL PAIN: 0
TROUBLE SWALLOWING: 0
CHEST TIGHTNESS: 0
SHORTNESS OF BREATH: 0
VOMITING: 0

## 2021-07-04 ASSESSMENT — PAIN SCALES - GENERAL
PAINLEVEL_OUTOF10: 10
PAINLEVEL_OUTOF10: 9
PAINLEVEL_OUTOF10: 6
PAINLEVEL_OUTOF10: 9
PAINLEVEL_OUTOF10: 10
PAINLEVEL_OUTOF10: 6

## 2021-07-04 ASSESSMENT — PAIN DESCRIPTION - ONSET: ONSET: SUDDEN

## 2021-07-04 ASSESSMENT — PAIN DESCRIPTION - PAIN TYPE
TYPE: CHRONIC PAIN
TYPE: ACUTE PAIN

## 2021-07-04 ASSESSMENT — PAIN DESCRIPTION - DESCRIPTORS
DESCRIPTORS: SHARP;STABBING
DESCRIPTORS: SPASM;SHARP

## 2021-07-04 ASSESSMENT — PAIN DESCRIPTION - LOCATION
LOCATION: LEG
LOCATION: LEG

## 2021-07-04 ASSESSMENT — PAIN DESCRIPTION - FREQUENCY
FREQUENCY: INTERMITTENT
FREQUENCY: INTERMITTENT

## 2021-07-04 ASSESSMENT — PAIN DESCRIPTION - ORIENTATION
ORIENTATION: RIGHT;LEFT
ORIENTATION: RIGHT

## 2021-07-04 NOTE — PROGRESS NOTES
PHARMACY NOTE  Alden Justin was ordered magnesium citrate. Per the [address], this medication is non-formulary and not stocked by pharmacy. The medication can be reordered at discharge.    Jero BarnesD, BCPS 7/4/2021 7:46 PM

## 2021-07-04 NOTE — H&P
History and Physical        Gisselle Martins is a 80 y.o. male  YOB: 1930        Chief complaint:   Right  Leg has been progressively swelling and has had worsening pain with breakdown of skin of in his right  leg. Despite  his diuretics and reinforcing instructions on elevation of his legs and using ace wraps and to cease usage of veterinary and homeopathic creams and topical agent,  The skin over  His right leg below the tibial tuberosity circumstantially is cellulitic and in areas  macerated and broken down. The zone of cellulitis extends 33.5 cm and ends 7 cm above the medial malleolus. His has pain in the ankle at rest.  He has a pmh of emergency stent placement for ischemic peripheral vascular disease below the trifurcation of the right lower leg three years ago. He has continued smoking. His pressure is running low and may represent volume reductions ,but also may represent a change in his cardiac output. He  was hypertensive several years ago. His glucose is now slightly elevated. His family took him to another hospital  Because of weakness and falling (scrapes skin off knees),and they say they were told he was dehydrated and should go home and drink water. No view of his legs was performed nor commented on according to them. He denies any episodes of sudden chest pain or worsening of his usual shortness of breath. Calf diameter 40cm. Ulcerated areal distal medial area is 9cm by 7 cm, and distal lateral ulcerated area is 3 cm by 5 cm. .   Usual weight hydrated is 210 pounds ideal dry weight. Family History  Diabetes No  Heart Disease Yes  Tuberculosis No  Cancer No  Other:         Family History: cardiovascular disease at an advanced age.        Past History  Asthma No  Chronic Bronchitis Yes  Emphysema Yes  Tuberculosis No  Smokes Yes( pipe 70 years)  Head Injury No  Epilepsy No  Kidney Disease No  Liver Disease/Hepatitis No  Yellow Jaundice No  Diabetes No  Thyroid Disease No  Heart Disease Yes  High Blood Pressure Yes (previously)  Angina No  Rheumatic Fever No  Cortisone Rx No  Alcohol Excess No  Pregnancy No  Glaucoma No  Loose Teeth Yes and No  Tranquilizers No  Other: peripheral vascular disease. 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 Authorizing Provider   ibuprofen (ADVIL;MOTRIN) 200 MG tablet Take 400 mg by mouth every 6 hours as needed for Pain       Historical Provider, MD   magnesium 30 MG tablet Take 30 mg by mouth daily       Historical Provider, MD   POTASSIUM GLUCONATE PO Take by mouth       Historical Provider, MD   Albuterol Sulfate (PRO AIR HFA IN) Inhale into the lungs 2 puffs every 6 hours as needed. Historical Provider, MD                    hydrOXYzine (VISTARIL) 50 MG capsule Take 50 mg by mouth 3 times daily as needed for Itching       Historical Provider, MD   furosemide (LASIX) 20 MG tablet Take 20 mg by mouth 2 times daily        Historical Provider, MD   tamsulosin (FLOMAX) 0.4 MG capsule Take 0.4 mg by mouth 2 times daily       Historical Provider, MD   lisinopril (PRINIVIL;ZESTRIL) 5 MG tablet Take 5 mg by mouth daily       Historical Provider, MD      ,   Current Hospital Medications Plavix   No current facility-administered medications for this encounter. Allergies: Patient has no known allergies.      Previous Surgery: percutaneous cath with angioplasty right leg 6/18, t and a, herniorrhaphy green light prostatic surgery         Physical Examination     Constitutional: well nourished tired appearing     Neurological: oriented times three cn 2-12 intact  Conductive hearing deficit      Eyes: perrla eom intact conj pink non icteric      Lymphatic wnl      Neck/Ear/Nose/Throat: no carotid bruits no masses slight cervical kyphosis     Respiratory: increased ap diameter chest slow expiration no rales faint rhonchi both bases no wheezing,but occasional bronchitic cough. Cardiovascular: rrr faint MRM base hear radiating into left axilla     Abdomen/GI: soft nor masses no tenderness. normal bowel sounds. Musculoskeletal: right  lower leg swollen right leg much larger than left with extensive skin maceration and breakdown tissues weeping serum erythematous capillary filling less than two seconds. doppler signals biphasic dorsalis pedis and posterior tibialis and popliteal arteries. Palpable femoral pulses bilaterally. G/U female: na                          G/U male: grossly wnl male age appropriate rectal negative mild enlargement of prostate non tender resilient . Viscous stool in rectal vault brown hemoccult negative. Bruise right gluteus. Chest/Breasts: negative      Skin/Integumentary: solar keratosis and elastosis      Psychological: droll sense of humor      Other:               Conclusion:   Cellulitis  Right leg  with skin breakdown and dependent oedema copd, peripheral vascular disease pulmonary hypertension bronchitis, prostatic urinary outflow obstruction dehydration. Planned course of Action: admit to hospital  Ultrasound for DVT , antibiotics, possible cardiac evaluation                 The beneficiary may reasonably be expected to be discharged or transferred to a hospital within 96 hours after admission.       Estimated length of stay 96 plus hours            Time In:     Time Out: 19:15        Toya Bosworth, MD

## 2021-07-04 NOTE — ED PROVIDER NOTES
@@  eMERGENCY dEPARTMENT eNCOUnter      Pt Name: Bebo Gilbert  MRN: [id number]  [place] 7/4/1930  Date of evaluation: 7/4/2021  Provider: Clare Mota MD    CHIEF COMPLAINT       Chief Complaint   Patient presents with    Leg Pain     Patient presents to ER by car; c/o sharp leg pain in both legs but states it is worse on the right leg. HISTORYOF PRESENT ILLNESS   (Location/Symptom, Timing/Onset, Context/Setting, Quality, Duration, ModifyingFactors, Severity) Note limiting factors. HPI    Bebo Gilbert is a 80 y.o. male who presents to the emergency department with increasing pain, redness, drainage from right leg. He has a history of chronic lymphedema with frequent cellulitis of the legs. Last admitted for this in March, wound cultures grew Serratia marcescens, Enterococcus faecalis, Enterococcus casseliflavus. Feels like he is having similar symptoms. He has a history of tenuous fluid balance, his weight today is 209 which is close to his goal.    Nursing Notes were reviewed. REVIEW OF SYSTEMS    (2+ for level 4; 10+ for level 5)   Review of Systems   Constitutional: Negative for diaphoresis and fever. HENT: Negative for rhinorrhea, sore throat and trouble swallowing. Eyes: Negative for pain. Respiratory: Negative for chest tightness, shortness of breath and wheezing. Cardiovascular: Negative for chest pain and leg swelling. Gastrointestinal: Negative for abdominal pain, diarrhea and vomiting. Endocrine: Negative for polyuria. Genitourinary: Negative for dysuria and frequency. Musculoskeletal: Negative for back pain, myalgias, neck pain and neck stiffness. Skin: Positive for color change and wound. Negative for rash. Allergic/Immunologic: Negative for immunocompromised state. Neurological: Negative for dizziness, seizures, syncope and headaches. Hematological: Does not bruise/bleed easily. Psychiatric/Behavioral: Negative for confusion.    All other with Friends and Family:     Frequency of Social Gatherings with Friends and Family:     Attends [employer] Services:     Active Member of Clubs or Organizations:     Attends Club or Organization Meetings:     Marital Status:    Intimate Partner Violence:     Fear of Current or Ex-Partner:     Emotionally Abused:     Physically Abused:     Sexually Abused:        SCREENINGS           PHYSICAL EXAM    (up to 7 forlevel 4, 8 or more for level 5)     ED Triage Vitals   BP Temp Temp Source Pulse Resp SpO2 Height Weight   07/04/21 1503 07/04/21 1502 07/04/21 1502 07/04/21 1502 07/04/21 1503 07/04/21 1503 07/04/21 1503 07/04/21 1503   (!) 105/52 97.9 °F (36.6 °C) Oral 58 20 96 % 5' 4\" (1.626 m) 209 lb 6.4 oz (95 kg)       Physical Exam  Vitals and nursing note reviewed. Constitutional:       General: He is not in acute distress. Appearance: He is well-developed. HENT:      Head: Normocephalic and atraumatic. Eyes:      General:         Right eye: No discharge. Left eye: No discharge. Pupils: Pupils are equal, round, and reactive to light. Cardiovascular:      Rate and Rhythm: Normal rate and regular rhythm. Heart sounds: Normal heart sounds. No murmur heard. No friction rub. No gallop. Pulmonary:      Effort: Pulmonary effort is normal. No respiratory distress. Breath sounds: Normal breath sounds. No stridor. No wheezing or rales. Chest:      Chest wall: No tenderness. Abdominal:      General: Bowel sounds are normal. There is no distension. Palpations: Abdomen is soft. There is no mass. Tenderness: There is no abdominal tenderness. There is no guarding or rebound. Musculoskeletal:         General: No tenderness. Normal range of motion. Cervical back: Normal range of motion and neck supple. Lymphadenopathy:      Cervical: No cervical adenopathy. Skin:     General: Skin is warm and dry. Findings: Erythema present. No rash.       Comments: He has chronic lymphedema of both legs with darkening of the skin. The right leg looks acutely cellulitic, much more erythematous than the left and just proximal to the ankle has open shallow weeping area around the anterior aspect of the leg. No crepitus or bullae. Neurological:      Mental Status: He is alert and oriented to person, place, and time. Cranial Nerves: No cranial nerve deficit. Coordination: Coordination normal.   Psychiatric:         Behavior: Behavior normal.         Thought Content: Thought content normal.         Judgment: Judgment normal.         DIAGNOSTIC RESULTS     EKG (Per Emergency Physician):   Sinus bradycardia at a rate of 52, normal ST segments, normal T waves, no acute ischemia, QTc 411    RADIOLOGY (Per Emergency Physician):   n/a    Interpretation per the Radiologist below, ifavailable at the time of this note:  No results found. ED BEDSIDE ULTRASOUND:   Performed by ED Physician - none    LABS:  Labs Reviewed   CBC WITH AUTO DIFFERENTIAL - Abnormal; Notable for the following components:       Result Value    RBC 3.76 (*)     Hemoglobin 11.8 (*)     Hematocrit 34.4 (*)     Monocytes 10 (*)     All other components within normal limits   BASIC METABOLIC PANEL W/ REFLEX TO MG FOR LOW K - Abnormal; Notable for the following components:    BUN 47 (*)     CREATININE 2.27 (*)     Bun/Cre Ratio 21 (*)     Anion Gap 8 (*)     GFR Non- 27 (*)     GFR  33 (*)     All other components within normal limits   BRAIN NATRIURETIC PEPTIDE - Abnormal; Notable for the following components:    Pro- (*)     All other components within normal limits   CULTURE, WOUND   CULTURE, BLOOD 1   CULTURE, BLOOD 1   LACTIC ACID        All other labs were within normal range or not returned as of this dictation.     EMERGENCY DEPARTMENT COURSE and DIFFERENTIALDIAGNOSIS/MDM:   Vitals:    Vitals:    07/04/21 1502 07/04/21 1503 07/04/21 1514 07/04/21 1610   BP:  (!) 105/52 (!) 100/51 (!) 129/91   Pulse: 58      Resp:  20     Temp: 97.9 °F (36.6 °C)      TempSrc: Oral      SpO2:  96% 97% 98%   Weight:  209 lb 6.4 oz (95 kg)     Height:  5' 4\" (1.626 m)         Medications   0.9 % sodium chloride infusion (has no administration in time range)   piperacillin-tazobactam (ZOSYN) 4,500 mg in dextrose 5 % 100 mL IVPB (mini-bag) (4,500 mg Intravenous New Bag 7/4/21 6289)   traMADol (ULTRAM) tablet 50 mg (50 mg Oral Given 7/4/21 1617)       MDM. Patient has a progressing cellulitis of his right leg. Wound cultures, blood cultures obtained, patient given Zosyn after discussing past wound cultures with pharmacy. His creatinine is 2.27 which is up significantly for him, will restart gentle hydration. He does have a history of CHF and pulmonary hypertension however no clinical evidence of fluid overload with a BNP of 555 which is low for him now. He will require admission for further evaluation and treatment. Dr. Kayla Redmond will admit. REVAL:         CRITICAL CARE TIME   Total Critical Care time was 0 minutes, excluding separatelyreportable procedures. There was a high probability ofclinically significant/life threatening deterioration in the patient's condition which required my urgent intervention. CONSULTS:  [unfilled]    PROCEDURES:  Unless otherwise noted below, none     Procedures    FINAL IMPRESSION      1. Cellulitis of right leg    2. Acute kidney injury [facility])          DISPOSITION/PLAN   DISPOSITION Decision To Admit 07/04/2021 04:31:08 PM      PATIENT REFERRED TO:  No follow-up provider specified. DISCHARGE MEDICATIONS:  New Prescriptions    No medications on file              Summation      Patient Course: Progressing right leg cellulitis, acute kidney injury, given Zosyn and will admit.     ED Medications administered this visit:    Medications   0.9 % sodium chloride infusion (has no administration in time range)   piperacillin-tazobactam (ZOSYN) 4,500 mg in dextrose 5 % 100 mL IVPB (mini-bag) (4,500 mg Intravenous New Bag 7/4/21 1150)   traMADol (ULTRAM) tablet 50 mg (50 mg Oral Given 7/4/21 1617)       New Prescriptions from this visit:    New Prescriptions    No medications on file       Follow-up:  No follow-up provider specified. Final Impression:  1. Cellulitis of right leg    2. Acute kidney injury ([facility] [address]                   (Please note:  Portions of this note were completed with a voicerecognition program.  Efforts were made to edit the dictations but occasionally words and phrases are mis-transcribed.)  Form v2016. J.5-cn    Jono Martinez MD (electronically signed)  Emergency Medicine Provider            Jono Martinez MD  07/04/21 2793

## 2021-07-05 LAB
ANION GAP SERPL CALCULATED.3IONS-SCNC: 5 MMOL/L (ref 9–17)
BUN BLDV-MCNC: 39 MG/DL (ref 8–23)
BUN/CREAT BLD: 22 (ref 9–20)
CALCIUM SERPL-MCNC: 8.5 MG/DL (ref 8.6–10.4)
CHLORIDE BLD-SCNC: 105 MMOL/L (ref 98–107)
CO2: 28 MMOL/L (ref 20–31)
CREAT SERPL-MCNC: 1.81 MG/DL (ref 0.7–1.2)
EKG ATRIAL RATE: 52 BPM
EKG P AXIS: 66 DEGREES
EKG P-R INTERVAL: 214 MS
EKG Q-T INTERVAL: 442 MS
EKG QRS DURATION: 98 MS
EKG QTC CALCULATION (BAZETT): 411 MS
EKG R AXIS: -38 DEGREES
EKG T AXIS: 0 DEGREES
EKG VENTRICULAR RATE: 52 BPM
GFR AFRICAN AMERICAN: 43 ML/MIN
GFR NON-AFRICAN AMERICAN: 35 ML/MIN
GFR SERPL CREATININE-BSD FRML MDRD: ABNORMAL ML/MIN/{1.73_M2}
GFR SERPL CREATININE-BSD FRML MDRD: ABNORMAL ML/MIN/{1.73_M2}
GLUCOSE BLD-MCNC: 135 MG/DL (ref 70–99)
POTASSIUM SERPL-SCNC: 4.3 MMOL/L (ref 3.7–5.3)
SODIUM BLD-SCNC: 138 MMOL/L (ref 135–144)

## 2021-07-05 PROCEDURE — 2580000003 HC RX 258: Performed by: SURGERY

## 2021-07-05 PROCEDURE — 80048 BASIC METABOLIC PNL TOTAL CA: CPT

## 2021-07-05 PROCEDURE — 1200000000 HC SEMI PRIVATE

## 2021-07-05 PROCEDURE — 36415 COLL VENOUS BLD VENIPUNCTURE: CPT

## 2021-07-05 PROCEDURE — 6370000000 HC RX 637 (ALT 250 FOR IP): Performed by: SURGERY

## 2021-07-05 PROCEDURE — 93010 ELECTROCARDIOGRAM REPORT: CPT | Performed by: INTERNAL MEDICINE

## 2021-07-05 PROCEDURE — 94761 N-INVAS EAR/PLS OXIMETRY MLT: CPT

## 2021-07-05 PROCEDURE — 6360000002 HC RX W HCPCS: Performed by: SURGERY

## 2021-07-05 RX ORDER — 0.9 % SODIUM CHLORIDE 0.9 %
250 INTRAVENOUS SOLUTION INTRAVENOUS ONCE
Status: COMPLETED | OUTPATIENT
Start: 2021-07-05 | End: 2021-07-05

## 2021-07-05 RX ORDER — TRAMADOL HYDROCHLORIDE 50 MG/1
50 TABLET ORAL EVERY 12 HOURS PRN
Status: DISCONTINUED | OUTPATIENT
Start: 2021-07-05 | End: 2021-07-14 | Stop reason: HOSPADM

## 2021-07-05 RX ADMIN — SODIUM CHLORIDE 250 ML: 9 INJECTION, SOLUTION INTRAVENOUS at 16:51

## 2021-07-05 RX ADMIN — GABAPENTIN 300 MG: 300 CAPSULE ORAL at 10:20

## 2021-07-05 RX ADMIN — PIPERACILLIN SODIUM AND TAZOBACTAM SODIUM 3375 MG: 3; .375 INJECTION, POWDER, LYOPHILIZED, FOR SOLUTION INTRAVENOUS at 15:34

## 2021-07-05 RX ADMIN — PIPERACILLIN SODIUM AND TAZOBACTAM SODIUM 3375 MG: 3; .375 INJECTION, POWDER, LYOPHILIZED, FOR SOLUTION INTRAVENOUS at 23:27

## 2021-07-05 RX ADMIN — SODIUM CHLORIDE, POTASSIUM CHLORIDE, SODIUM LACTATE AND CALCIUM CHLORIDE: 600; 310; 30; 20 INJECTION, SOLUTION INTRAVENOUS at 23:06

## 2021-07-05 RX ADMIN — ALBUTEROL SULFATE 2 PUFF: 90 AEROSOL, METERED RESPIRATORY (INHALATION) at 10:52

## 2021-07-05 RX ADMIN — OXYCODONE HYDROCHLORIDE AND ACETAMINOPHEN 500 MG: 500 TABLET ORAL at 10:20

## 2021-07-05 RX ADMIN — PIPERACILLIN SODIUM AND TAZOBACTAM SODIUM 3375 MG: 3; .375 INJECTION, POWDER, LYOPHILIZED, FOR SOLUTION INTRAVENOUS at 08:15

## 2021-07-05 RX ADMIN — SODIUM CHLORIDE 250 ML: 9 INJECTION, SOLUTION INTRAVENOUS at 21:48

## 2021-07-05 RX ADMIN — CLOPIDOGREL BISULFATE 75 MG: 75 TABLET ORAL at 10:20

## 2021-07-05 RX ADMIN — ALBUTEROL SULFATE 2 PUFF: 90 AEROSOL, METERED RESPIRATORY (INHALATION) at 17:37

## 2021-07-05 RX ADMIN — TRAMADOL HYDROCHLORIDE 50 MG: 50 TABLET, FILM COATED ORAL at 20:38

## 2021-07-05 RX ADMIN — SODIUM CHLORIDE 250 ML: 9 INJECTION, SOLUTION INTRAVENOUS at 06:09

## 2021-07-05 RX ADMIN — SODIUM CHLORIDE, POTASSIUM CHLORIDE, SODIUM LACTATE AND CALCIUM CHLORIDE: 600; 310; 30; 20 INJECTION, SOLUTION INTRAVENOUS at 05:12

## 2021-07-05 RX ADMIN — ATORVASTATIN CALCIUM 40 MG: 40 TABLET, FILM COATED ORAL at 19:40

## 2021-07-05 RX ADMIN — PIPERACILLIN SODIUM AND TAZOBACTAM SODIUM 3375 MG: 3; .375 INJECTION, POWDER, LYOPHILIZED, FOR SOLUTION INTRAVENOUS at 00:01

## 2021-07-05 RX ADMIN — Medication 1200 UNITS: at 10:19

## 2021-07-05 RX ADMIN — CHOLECALCIFEROL TAB 25 MCG (1000 UNIT) 1000 UNITS: 25 TAB at 10:20

## 2021-07-05 RX ADMIN — GABAPENTIN 300 MG: 300 CAPSULE ORAL at 19:40

## 2021-07-05 ASSESSMENT — PAIN DESCRIPTION - ORIENTATION: ORIENTATION: RIGHT

## 2021-07-05 ASSESSMENT — PAIN SCALES - GENERAL
PAINLEVEL_OUTOF10: 6
PAINLEVEL_OUTOF10: 10

## 2021-07-05 ASSESSMENT — PAIN DESCRIPTION - LOCATION: LOCATION: LEG

## 2021-07-05 ASSESSMENT — PAIN DESCRIPTION - PAIN TYPE: TYPE: ACUTE PAIN

## 2021-07-05 ASSESSMENT — PAIN DESCRIPTION - DESCRIPTORS: DESCRIPTORS: SPASM;SHARP

## 2021-07-05 NOTE — PROGRESS NOTES
Pt put call like on and is choking on his big wad of gum he constantly has in his mouth. Pt trying to cough up the wad however at this point he is having much difficulty. This nurse unable to visualize the gum and code button is activated. Multiple staff present and pt is finally able to expel a very large wad of gum. VS and assessment is unchanged. Dr. Mirella Carter is made aware of such situation. New orders received.

## 2021-07-05 NOTE — PROGRESS NOTES
Renal Dosage Adjustment   Current CrCl= 27 ml/min.  Changed tramadol 50 mg po q6h prn to q12h prn     CrCl <30 mL/minute: Immediate release: Increase dosing interval to every 12 hours  Electronically signed by Alethea Simms RPH on 7/5/2021 at 10:34 AM

## 2021-07-05 NOTE — PLAN OF CARE
Remains in bed for this shift. Takes 50 mg Tramadol PO for pain he rated 9/10 scale. Wound care to RLE Q4 hours x 24 then Q6.

## 2021-07-05 NOTE — PROGRESS NOTES
Dot #1        Subjective: Patient remarks that meds for neuropathic pains work well on him and he would like to rely more on them than tramadol. He denies chest pains denies worsening shortness of breath. Objective: weight 205 lbs. Afebrile vs stable chest slow expiration brassy cough intermittently . Car rrr mrm carotids no bruits. Ankles minimal oedema right ankle. Hear is less in tissues of right leg. Cellulitis about same. bp responds to saline bolus and creatinine is better 2.2 to  1.8. Assessment: cellulitis right lower extremity. Multiple mixed bacterial infection . dehydration with prerenal failure. Multiple systems comorbidity. Plan: another fluid bolus. Ace wrap legs. Repeat bmp in am with bnp. Ideal dry body weight 210 lbs. Awaiting culture identification and sensitivities in order to consolidate care plan. All of the patients questions answered.       Rayshawn Graham MD

## 2021-07-06 LAB
ANION GAP SERPL CALCULATED.3IONS-SCNC: 6 MMOL/L (ref 9–17)
BNP INTERPRETATION: ABNORMAL
BUN BLDV-MCNC: 28 MG/DL (ref 8–23)
BUN/CREAT BLD: 19 (ref 9–20)
CALCIUM SERPL-MCNC: 8.3 MG/DL (ref 8.6–10.4)
CHLORIDE BLD-SCNC: 106 MMOL/L (ref 98–107)
CO2: 26 MMOL/L (ref 20–31)
CREAT SERPL-MCNC: 1.47 MG/DL (ref 0.7–1.2)
GFR AFRICAN AMERICAN: 54 ML/MIN
GFR NON-AFRICAN AMERICAN: 45 ML/MIN
GFR SERPL CREATININE-BSD FRML MDRD: ABNORMAL ML/MIN/{1.73_M2}
GFR SERPL CREATININE-BSD FRML MDRD: ABNORMAL ML/MIN/{1.73_M2}
GLUCOSE BLD-MCNC: 183 MG/DL (ref 70–99)
POTASSIUM SERPL-SCNC: 3.9 MMOL/L (ref 3.7–5.3)
PRO-BNP: 1452 PG/ML
SODIUM BLD-SCNC: 138 MMOL/L (ref 135–144)

## 2021-07-06 PROCEDURE — 94761 N-INVAS EAR/PLS OXIMETRY MLT: CPT

## 2021-07-06 PROCEDURE — 94664 DEMO&/EVAL PT USE INHALER: CPT

## 2021-07-06 PROCEDURE — 80048 BASIC METABOLIC PNL TOTAL CA: CPT

## 2021-07-06 PROCEDURE — 2580000003 HC RX 258: Performed by: SURGERY

## 2021-07-06 PROCEDURE — 1200000000 HC SEMI PRIVATE

## 2021-07-06 PROCEDURE — 6370000000 HC RX 637 (ALT 250 FOR IP): Performed by: SURGERY

## 2021-07-06 PROCEDURE — 36415 COLL VENOUS BLD VENIPUNCTURE: CPT

## 2021-07-06 PROCEDURE — 83880 ASSAY OF NATRIURETIC PEPTIDE: CPT

## 2021-07-06 PROCEDURE — 6360000002 HC RX W HCPCS: Performed by: SURGERY

## 2021-07-06 PROCEDURE — 94640 AIRWAY INHALATION TREATMENT: CPT

## 2021-07-06 RX ORDER — IPRATROPIUM BROMIDE AND ALBUTEROL SULFATE 2.5; .5 MG/3ML; MG/3ML
1 SOLUTION RESPIRATORY (INHALATION) 4 TIMES DAILY
Status: DISCONTINUED | OUTPATIENT
Start: 2021-07-06 | End: 2021-07-14 | Stop reason: HOSPADM

## 2021-07-06 RX ORDER — BISACODYL 10 MG
10 SUPPOSITORY, RECTAL RECTAL DAILY PRN
Status: DISCONTINUED | OUTPATIENT
Start: 2021-07-06 | End: 2021-07-14 | Stop reason: HOSPADM

## 2021-07-06 RX ORDER — SODIUM CHLORIDE, SODIUM LACTATE, POTASSIUM CHLORIDE, CALCIUM CHLORIDE 600; 310; 30; 20 MG/100ML; MG/100ML; MG/100ML; MG/100ML
INJECTION, SOLUTION INTRAVENOUS CONTINUOUS
Status: DISCONTINUED | OUTPATIENT
Start: 2021-07-06 | End: 2021-07-06

## 2021-07-06 RX ORDER — 0.9 % SODIUM CHLORIDE 0.9 %
250 INTRAVENOUS SOLUTION INTRAVENOUS ONCE
Status: COMPLETED | OUTPATIENT
Start: 2021-07-06 | End: 2021-07-06

## 2021-07-06 RX ADMIN — GABAPENTIN 300 MG: 300 CAPSULE ORAL at 21:16

## 2021-07-06 RX ADMIN — ATORVASTATIN CALCIUM 40 MG: 40 TABLET, FILM COATED ORAL at 21:16

## 2021-07-06 RX ADMIN — ALBUTEROL SULFATE 2 PUFF: 90 AEROSOL, METERED RESPIRATORY (INHALATION) at 05:27

## 2021-07-06 RX ADMIN — CLOPIDOGREL BISULFATE 75 MG: 75 TABLET ORAL at 08:44

## 2021-07-06 RX ADMIN — PIPERACILLIN SODIUM AND TAZOBACTAM SODIUM 3375 MG: 3; .375 INJECTION, POWDER, LYOPHILIZED, FOR SOLUTION INTRAVENOUS at 16:04

## 2021-07-06 RX ADMIN — TRAMADOL HYDROCHLORIDE 50 MG: 50 TABLET, FILM COATED ORAL at 22:24

## 2021-07-06 RX ADMIN — PIPERACILLIN SODIUM AND TAZOBACTAM SODIUM 3375 MG: 3; .375 INJECTION, POWDER, LYOPHILIZED, FOR SOLUTION INTRAVENOUS at 08:46

## 2021-07-06 RX ADMIN — MELATONIN 4.5 MG: at 21:19

## 2021-07-06 RX ADMIN — GABAPENTIN 300 MG: 300 CAPSULE ORAL at 08:44

## 2021-07-06 RX ADMIN — HYDROXYZINE PAMOATE 50 MG: 25 CAPSULE ORAL at 21:19

## 2021-07-06 RX ADMIN — Medication 1200 UNITS: at 08:40

## 2021-07-06 RX ADMIN — LISINOPRIL 10 MG: 10 TABLET ORAL at 08:45

## 2021-07-06 RX ADMIN — CHOLECALCIFEROL TAB 25 MCG (1000 UNIT) 1000 UNITS: 25 TAB at 08:45

## 2021-07-06 RX ADMIN — TAMSULOSIN HYDROCHLORIDE 0.4 MG: 0.4 CAPSULE ORAL at 08:45

## 2021-07-06 RX ADMIN — OXYCODONE HYDROCHLORIDE AND ACETAMINOPHEN 500 MG: 500 TABLET ORAL at 08:44

## 2021-07-06 RX ADMIN — IPRATROPIUM BROMIDE AND ALBUTEROL SULFATE 1 AMPULE: .5; 3 SOLUTION RESPIRATORY (INHALATION) at 19:56

## 2021-07-06 RX ADMIN — BISACODYL 10 MG: 10 SUPPOSITORY RECTAL at 16:04

## 2021-07-06 RX ADMIN — SODIUM CHLORIDE 250 ML: 9 INJECTION, SOLUTION INTRAVENOUS at 00:20

## 2021-07-06 RX ADMIN — IPRATROPIUM BROMIDE AND ALBUTEROL SULFATE 1 AMPULE: .5; 3 SOLUTION RESPIRATORY (INHALATION) at 16:12

## 2021-07-06 ASSESSMENT — PAIN SCALES - GENERAL
PAINLEVEL_OUTOF10: 2
PAINLEVEL_OUTOF10: 0
PAINLEVEL_OUTOF10: 0
PAINLEVEL_OUTOF10: 2
PAINLEVEL_OUTOF10: 6
PAINLEVEL_OUTOF10: 1

## 2021-07-06 ASSESSMENT — PAIN DESCRIPTION - ORIENTATION: ORIENTATION: RIGHT

## 2021-07-06 ASSESSMENT — PAIN DESCRIPTION - PAIN TYPE: TYPE: ACUTE PAIN

## 2021-07-06 ASSESSMENT — PAIN DESCRIPTION - DESCRIPTORS: DESCRIPTORS: SPASM;SHARP

## 2021-07-06 ASSESSMENT — PAIN DESCRIPTION - FREQUENCY: FREQUENCY: INTERMITTENT

## 2021-07-06 ASSESSMENT — PAIN DESCRIPTION - PROGRESSION
CLINICAL_PROGRESSION: GRADUALLY WORSENING
CLINICAL_PROGRESSION: GRADUALLY WORSENING

## 2021-07-06 ASSESSMENT — PAIN DESCRIPTION - LOCATION: LOCATION: LEG

## 2021-07-06 NOTE — PLAN OF CARE
Problem: Falls - Risk of:  Goal: Will remain free from falls  Description: Will remain free from falls  7/6/2021 1107 by Mike Lorenzo RN  Outcome: Met This Shift  7/6/2021 0647 by Bishop Raine RN  Outcome: Ongoing     Problem: Pain:  Description: Pain management should include both nonpharmacologic and pharmacologic interventions.   Goal: Pain level will decrease  Description: Pain level will decrease  7/6/2021 1107 by Mike Lorenzo RN  Outcome: Met This Shift  7/6/2021 0647 by Bishop Raine RN  Outcome: Ongoing     Problem: Skin Integrity:  Goal: Will show no infection signs and symptoms  Description: Will show no infection signs and symptoms  Outcome: Met This Shift

## 2021-07-06 NOTE — PLAN OF CARE
Problem: Falls - Risk of:  Goal: Will remain free from falls  Description: Will remain free from falls  7/6/2021 1108 by Blane Ayala RN  Outcome: Met This Shift  7/6/2021 1107 by Blane Ayala RN  Outcome: Met This Shift  7/6/2021 0647 by Bc Chong RN  Outcome: Ongoing  Goal: Absence of physical injury  Description: Absence of physical injury  Outcome: Met This Shift     Problem: Pain:  Description: Pain management should include both nonpharmacologic and pharmacologic interventions.   Goal: Pain level will decrease  Description: Pain level will decrease  7/6/2021 1108 by Blane Ayala RN  Outcome: Met This Shift  7/6/2021 1107 by Blane Ayala RN  Outcome: Met This Shift  7/6/2021 0647 by Bc Chong RN  Outcome: Ongoing     Problem: Skin Integrity:  Goal: Will show no infection signs and symptoms  Description: Will show no infection signs and symptoms  Outcome: Met This Shift     Problem: Gas Exchange - Impaired:  Goal: Levels of oxygenation will improve  Description: Levels of oxygenation will improve  Outcome: Met This Shift

## 2021-07-06 NOTE — PROGRESS NOTES
Pt systolic BP has been in the low 90's. Dr. Sally Riojas calls in and checks on pt frequently. Update provided. New orders received and entered into the computer. Pt is very pleasant and cooperative. Pt asks to get to the commode for BM. Pt very steady however only passes gas. Call light is within reach.

## 2021-07-06 NOTE — PROGRESS NOTES
Quality flow rounds held on 7/6/21     Husam Mreida is admitted for cellulitis of legs  Length of stay 2. Education:    Needed Education: wound care,  Meds, follow up,diet      Do you have any questions regarding your plan of care while at the hospital? denies    Planned Disposition:               [x]  Home when able                [] Swing Bed                [] ECF/SNF               [] Other/TBD    Barriers to Discharge:    Can you afford your medications? yes   Do you have transportation to follow up appointments? Daughter drives   Do you need any new equipment at home? Wound care supplies   Current equipment includes   walker, grab bars, shower chair, hospital bed    Do you have a living will or durable power of  for healthcare? yes               If yes do we have a copy on file? yes    Do you or your family have any questions or concerns we haven't already discussed? Denies    Lives with his daughter and plans to return home with her. Meghann Bartlett and writer present for rounding.   PCP- Dr Genevieve Rosales

## 2021-07-06 NOTE — PROGRESS NOTES
Dot # 2        Subjective: Patient reports that he \" doesn't feel any different\" ie he feels pretty normal . He reported pain in right leg which was relieved by his pain pill and Neurontin. He denies any bowel movement. He asks about species of bacteria in his leg. We have only MRSA to report at this point. Remainder are pending. Sensitivity report is also pending. Objective: Afebrile vs stable with weight this am reported as 219. Chest slow expiration some expiratory wheeze. Breath sounds bilaterally. Card rrr MRM carotids no bruits . Dressings taken down to skin level. Square of Telfa slides off wound. Labs show a jump in bnp and a further decrease in creatinine. Electrolytes stable. Creatinine 1.8 down to 1.4 . Clenton Reas Abdomen soft normal bowel sounds are present. Wound care to areas of open ulcerated skin right ankle. Intake balance 133 ml on 7/5 and 960 ml today so far. Weight 205 on admission and 219 today for a 14 pound weight gain. Assessment: cellulitis right lower leg. At least MRSA present. Dehydration with improving creatinine. Pulmonary hypertension. Bronchitis (long term and currently active pipe smoker). Plan: Careful measures. Reduce iv rate. Repeat bnp and bmp in 24 hours.     Heather Ramirez MD

## 2021-07-06 NOTE — PROGRESS NOTES
Met with Patient during quality flow rounds and spoke with daughter via telephone conversation re: discharge plans. Patient is a 80year old , white male, admitted with a diagnosis of Cellulitis. Patient is alert and oriented, pleasant and cooperative with this assessment. States that he wishes to return home with his daughter when medically stable. Daughter agreeable with this plan but also open to a swing bed stay and/or a home health referral if needed and at physician recommendation. Patient resides with his daughter and son in law at their [place]. Patient has a walker and a cane to use as needed but states that he typically does not use any DME. Patient has no outside services in place. Daughter manages his cooking and cleaning and also provides assistance with his medications. Patient no longer drives and relies on family for their assistance with his transportation needs. PCP is Dr. Chano Jeter. Patient nor daughter voice any needs or concerns as it relates to him affording his medications. Discharge plan is for Patient to return home with his daughter when deemed medically stable. Patient is a 'Full Code' status and does have his medical directives in place. LSW to assist with any/all discharge planning needs as they present.     Venkat Jiménez [place]  7/6/2021

## 2021-07-07 ENCOUNTER — APPOINTMENT (OUTPATIENT)
Dept: GENERAL RADIOLOGY | Age: 86
DRG: 602 | End: 2021-07-07
Payer: MEDICARE

## 2021-07-07 LAB
ANION GAP SERPL CALCULATED.3IONS-SCNC: 6 MMOL/L (ref 9–17)
BNP INTERPRETATION: ABNORMAL
BUN BLDV-MCNC: 21 MG/DL (ref 8–23)
BUN/CREAT BLD: 15 (ref 9–20)
CALCIUM SERPL-MCNC: 8.6 MG/DL (ref 8.6–10.4)
CHLORIDE BLD-SCNC: 108 MMOL/L (ref 98–107)
CO2: 25 MMOL/L (ref 20–31)
CREAT SERPL-MCNC: 1.42 MG/DL (ref 0.7–1.2)
CULTURE: ABNORMAL
DIRECT EXAM: ABNORMAL
GFR AFRICAN AMERICAN: 57 ML/MIN
GFR NON-AFRICAN AMERICAN: 47 ML/MIN
GFR SERPL CREATININE-BSD FRML MDRD: ABNORMAL ML/MIN/{1.73_M2}
GFR SERPL CREATININE-BSD FRML MDRD: ABNORMAL ML/MIN/{1.73_M2}
GLUCOSE BLD-MCNC: 174 MG/DL (ref 70–99)
Lab: ABNORMAL
POTASSIUM SERPL-SCNC: 4 MMOL/L (ref 3.7–5.3)
PRO-BNP: 1365 PG/ML
SODIUM BLD-SCNC: 139 MMOL/L (ref 135–144)
SPECIMEN DESCRIPTION: ABNORMAL

## 2021-07-07 PROCEDURE — 6370000000 HC RX 637 (ALT 250 FOR IP): Performed by: SURGERY

## 2021-07-07 PROCEDURE — 36415 COLL VENOUS BLD VENIPUNCTURE: CPT

## 2021-07-07 PROCEDURE — 71046 X-RAY EXAM CHEST 2 VIEWS: CPT

## 2021-07-07 PROCEDURE — 2580000003 HC RX 258: Performed by: INTERNAL MEDICINE

## 2021-07-07 PROCEDURE — 1200000000 HC SEMI PRIVATE

## 2021-07-07 PROCEDURE — 6360000002 HC RX W HCPCS: Performed by: SURGERY

## 2021-07-07 PROCEDURE — 2580000003 HC RX 258: Performed by: SURGERY

## 2021-07-07 PROCEDURE — 83880 ASSAY OF NATRIURETIC PEPTIDE: CPT

## 2021-07-07 PROCEDURE — 6370000000 HC RX 637 (ALT 250 FOR IP): Performed by: INTERNAL MEDICINE

## 2021-07-07 PROCEDURE — 2500000003 HC RX 250 WO HCPCS: Performed by: INTERNAL MEDICINE

## 2021-07-07 PROCEDURE — 94640 AIRWAY INHALATION TREATMENT: CPT

## 2021-07-07 PROCEDURE — 6360000002 HC RX W HCPCS: Performed by: INTERNAL MEDICINE

## 2021-07-07 PROCEDURE — 80048 BASIC METABOLIC PNL TOTAL CA: CPT

## 2021-07-07 RX ORDER — SODIUM CHLORIDE 0.9 % (FLUSH) 0.9 %
5-40 SYRINGE (ML) INJECTION 2 TIMES DAILY
Status: DISCONTINUED | OUTPATIENT
Start: 2021-07-07 | End: 2021-07-14 | Stop reason: HOSPADM

## 2021-07-07 RX ORDER — LACTOBACILLUS RHAMNOSUS GG 10B CELL
1 CAPSULE ORAL 2 TIMES DAILY WITH MEALS
Status: DISCONTINUED | OUTPATIENT
Start: 2021-07-07 | End: 2021-07-14 | Stop reason: HOSPADM

## 2021-07-07 RX ADMIN — TRAMADOL HYDROCHLORIDE 50 MG: 50 TABLET, FILM COATED ORAL at 21:44

## 2021-07-07 RX ADMIN — PIPERACILLIN SODIUM AND TAZOBACTAM SODIUM 3375 MG: 3; .375 INJECTION, POWDER, LYOPHILIZED, FOR SOLUTION INTRAVENOUS at 00:08

## 2021-07-07 RX ADMIN — IPRATROPIUM BROMIDE AND ALBUTEROL SULFATE 1 AMPULE: .5; 3 SOLUTION RESPIRATORY (INHALATION) at 05:00

## 2021-07-07 RX ADMIN — Medication 1200 UNITS: at 09:08

## 2021-07-07 RX ADMIN — TAMSULOSIN HYDROCHLORIDE 0.4 MG: 0.4 CAPSULE ORAL at 09:08

## 2021-07-07 RX ADMIN — CLOPIDOGREL BISULFATE 75 MG: 75 TABLET ORAL at 09:08

## 2021-07-07 RX ADMIN — Medication 1 CAPSULE: at 09:11

## 2021-07-07 RX ADMIN — HYDROXYZINE PAMOATE 50 MG: 25 CAPSULE ORAL at 21:44

## 2021-07-07 RX ADMIN — SODIUM CHLORIDE, PRESERVATIVE FREE 10 ML: 5 INJECTION INTRAVENOUS at 07:55

## 2021-07-07 RX ADMIN — PIPERACILLIN SODIUM AND TAZOBACTAM SODIUM 3375 MG: 3; .375 INJECTION, POWDER, LYOPHILIZED, FOR SOLUTION INTRAVENOUS at 07:52

## 2021-07-07 RX ADMIN — SODIUM CHLORIDE, PRESERVATIVE FREE 10 ML: 5 INJECTION INTRAVENOUS at 21:40

## 2021-07-07 RX ADMIN — CHOLECALCIFEROL TAB 25 MCG (1000 UNIT) 1000 UNITS: 25 TAB at 09:08

## 2021-07-07 RX ADMIN — GABAPENTIN 300 MG: 300 CAPSULE ORAL at 09:08

## 2021-07-07 RX ADMIN — ATORVASTATIN CALCIUM 40 MG: 40 TABLET, FILM COATED ORAL at 21:39

## 2021-07-07 RX ADMIN — MELATONIN 4.5 MG: at 21:46

## 2021-07-07 RX ADMIN — OXYCODONE HYDROCHLORIDE AND ACETAMINOPHEN 500 MG: 500 TABLET ORAL at 09:08

## 2021-07-07 RX ADMIN — DOXYCYCLINE 100 MG: 100 INJECTION, POWDER, LYOPHILIZED, FOR SOLUTION INTRAVENOUS at 10:00

## 2021-07-07 RX ADMIN — DOXYCYCLINE 100 MG: 100 INJECTION, POWDER, LYOPHILIZED, FOR SOLUTION INTRAVENOUS at 21:39

## 2021-07-07 RX ADMIN — GABAPENTIN 300 MG: 300 CAPSULE ORAL at 21:39

## 2021-07-07 RX ADMIN — IPRATROPIUM BROMIDE AND ALBUTEROL SULFATE 1 AMPULE: .5; 3 SOLUTION RESPIRATORY (INHALATION) at 11:11

## 2021-07-07 RX ADMIN — Medication 1 CAPSULE: at 17:03

## 2021-07-07 RX ADMIN — IPRATROPIUM BROMIDE AND ALBUTEROL SULFATE 1 AMPULE: .5; 3 SOLUTION RESPIRATORY (INHALATION) at 21:21

## 2021-07-07 RX ADMIN — IPRATROPIUM BROMIDE AND ALBUTEROL SULFATE 1 AMPULE: .5; 3 SOLUTION RESPIRATORY (INHALATION) at 16:18

## 2021-07-07 RX ADMIN — CEFTRIAXONE SODIUM 1000 MG: 1 INJECTION, POWDER, FOR SOLUTION INTRAMUSCULAR; INTRAVENOUS at 09:08

## 2021-07-07 ASSESSMENT — PAIN DESCRIPTION - ORIENTATION: ORIENTATION: RIGHT

## 2021-07-07 ASSESSMENT — PAIN DESCRIPTION - PAIN TYPE: TYPE: ACUTE PAIN

## 2021-07-07 ASSESSMENT — PAIN DESCRIPTION - DESCRIPTORS: DESCRIPTORS: ACHING

## 2021-07-07 ASSESSMENT — PAIN DESCRIPTION - PROGRESSION
CLINICAL_PROGRESSION: GRADUALLY WORSENING

## 2021-07-07 ASSESSMENT — PAIN SCALES - GENERAL
PAINLEVEL_OUTOF10: 0
PAINLEVEL_OUTOF10: 5
PAINLEVEL_OUTOF10: 2

## 2021-07-07 ASSESSMENT — PAIN DESCRIPTION - LOCATION: LOCATION: LEG

## 2021-07-07 NOTE — CONSULTS
Hospitalist Consult    7/7/2021 7:41 AM    Subjective:   Admit Date: 7/4/2021  PCP: Elvi Ureña MD    Physician requesting consult:  Dr. Bernard Lane    Reason for Consult:  Medical evaluation of renal insufficiency, COPD, HTN. History: Preethi Solis was admitted by Dr. Bernard Lane for right leg cellulitis with skin break down and swelling. Upon admission he was found to have acute on chronic kidney disease with hypotension. He has been receiving IV hydration with improvement in his creatinine. Wound cultures grew out MRSA and Serratia. Blood cultures have showed no growth. CXR on 7/4, showed atelectasis vs infiltrate in the right base. Preethi Solis has no complaints. He denies chest pain or SOB. He states he is eating and drinking well. Pain fluctuates in both legs, ACE wraps on. Systolic BP has been in the 90's. Diet: ADULT DIET; Regular    Past Medical History:        Diagnosis Date    Arthritis     COPD (chronic obstructive pulmonary disease) ([address]     Dependent edema     Hyperlipidemia     Hypertension     Psoriasis        Past Surgical History:        Procedure Laterality Date    ANGIOPLASTY Left 06/20/2018    [facility] -- leg    HERNIA REPAIR      NASAL POLYP SURGERY Bilateral     TONSILLECTOMY      TURP N/A 4/4/2019    CYSTOSCOPY TRANSURETHRAL RESECTION -PROSTATE LASER- PVP GREENLIGHT performed by Meghan Castellano MD at [facility] OR       Medications Prior to Admission:    Prior to Admission medications    Medication Sig Start Date End Date Taking? Authorizing Provider   lisinopril (PRINIVIL;ZESTRIL) 10 MG tablet Take 10 mg by mouth daily   Yes Historical Provider, MD   rifAMPin (RIFADIN) 300 MG capsule Take 300 mg by mouth daily   Yes Historical Provider, MD   gabapentin (NEURONTIN) 300 MG capsule Take 300 mg by mouth 2 times daily.    Yes Historical Provider, MD GOETZ BARAHONA WORT PO Take by mouth   Yes Historical Provider, MD   furosemide (LASIX) 20 MG tablet Take 1 tablet by mouth every other day Take one pill every odd day and two pills every even day 3/30/21  Yes Godfrey Arambula MD   tamsulosin [facility]) 0.4 MG capsule Take 1 capsule by mouth 2 times daily 3/30/21  Yes Godfrey Arambula MD   spironolactone (ALDACTONE) 25 MG tablet Take 0.5 tablets by mouth daily 11/5/20  Yes Godfrey Arambula MD   POTASSIUM CITRATE PO Take 99 mg by mouth daily   Yes Historical Provider, MD   clopidogrel (PLAVIX) 75 MG tablet Take 75 mg by mouth daily  2/8/19  Yes Historical Provider, MD   B Complex-Biotin-FA (B COMPLETE PO) Take 1 tablet by mouth daily    Yes Historical Provider, MD   vitamin C (ASCORBIC ACID) 500 MG tablet Take 500 mg by mouth daily   Yes Historical Provider, MD   vitamin D (CHOLECALCIFEROL) 1000 UNIT TABS tablet Take 1,000 Units by mouth daily   Yes Historical Provider, MD   vitamin E 1000 units capsule Take 1,000 Units by mouth daily   Yes Historical Provider, MD   Magnesium Citrate 100 MG TABS Take 100 mg by mouth daily   Yes Historical Provider, MD   atorvastatin (LIPITOR) 40 MG tablet Take 40 mg by mouth nightly   Yes Historical Provider, MD   melatonin 5 MG TABS tablet Take 5 mg by mouth daily as needed   Yes Historical Provider, MD   BOSWELLIA HUGH PO Take 250 mg by mouth daily   Yes Historical Provider, MD   Homeopathic Products (HOMEOPATHIC CALM SL) Take 1 tablet by mouth daily   Yes Historical Provider, MD   ibuprofen (ADVIL;MOTRIN) 200 MG tablet Take 400 mg by mouth every 6 hours as needed for Pain   Yes Historical Provider, MD   hydrOXYzine (VISTARIL) 50 MG capsule Take 50 mg by mouth every 6 hours as needed     Historical Provider, MD   Albuterol Sulfate (PROAIR HFA IN) Inhale into the lungs 2 puffs every 6 hours as needed. Historical Provider, MD       Allergies:  Patient has no known allergies. Social History:   TOBACCO:   reports that he has been smoking pipe. He has a 140.00 pack-year smoking history.  He has never used smokeless tobacco.  ETOH: reports no history of alcohol use. OCCUPATION:      Family History:   History reviewed. No pertinent family history. REVIEW OF SYSTEMS:  Constitutional:  negative for  fevers and chills  HEENT:  negative for  ear drainage, earaches, nasal congestion and sore throat  Neck:  No lumps or masses  Cardiac:  negative for  chest pain, dyspnea, palpitations  Respiratory:  positive for  dry cough  negative for  dyspnea and wheezing  Gastrointestinal:  negative for nausea, vomiting, abdominal pain and he does feel he is having some abdominal distention. :  negative for frequency and dysuria  Musculoskeletal:  positive for  arthralgias  Neuro:  negative        Medications:   Scheduled Meds:   ipratropium-albuterol  1 ampule Inhalation 4x daily    piperacillin-tazobactam  3,375 mg Intravenous Q8H    tamsulosin  0.4 mg Oral Daily    atorvastatin  40 mg Oral Nightly    clopidogrel  75 mg Oral Daily    gabapentin  300 mg Oral BID    lisinopril  10 mg Oral Daily    vitamin C  500 mg Oral Daily    Vitamin D  1,000 Units Oral Daily    vitamin E  1,200 Units Oral Daily     Continuous Infusions:  CBC:   Recent Labs     07/04/21  1538   WBC 7.9   HGB 11.8*        BMP:    Recent Labs     07/04/21  1538 07/05/21  0911 07/06/21  0955    138 138   K 4.3 4.3 3.9    105 106   CO2 29 28 26   BUN 47* 39* 28*   CREATININE 2.27* 1.81* 1.47*   GLUCOSE 93 135* 183*     Hepatic: No results for input(s): AST, ALT, ALB, BILITOT, ALKPHOS in the last 72 hours. Troponin: No results for input(s): TROPONINI in the last 72 hours. BNP: No results for input(s): BNP in the last 72 hours. Lipids: No results for input(s): CHOL, HDL in the last 72 hours. Invalid input(s): LDLCALCU  INR: No results for input(s): INR in the last 72 hours.     Physical Exam:   Vitals: BP 98/64   Pulse 59   Temp 98.2 °F (36.8 °C) (Oral)   Resp 18   Ht 5' 4\" (1.626 m)   Wt 220 lb 1.6 oz (99.8 kg)   SpO2 95%   BMI 37.78 kg/m²   General appearance: alert and cooperative with exam  HEENT: Head: Normocephalic, no lesions, without obvious abnormality. Eye: Normal external eye, conjunctiva, lids cornea, MELECIO. Ears: Normal TM's bilaterally. Normal auditory canals and external ears. Non-tender. Nose: Normal external nose, mucus membranes and septum. Pharynx: Dentures not in, oral mucosa moist, no oral lesions, posterior pharynx with no erythema. Neck: no adenopathy, no carotid bruit and supple, symmetrical, trachea midline  Lungs: Few basilar rales posteriorly  Heart: regular rate and rhythm, S1, S2 normal and II/VI systolic murmur. Abdomen: soft, non-tender; bowel sounds normal; no masses,  no organomegaly  Extremities: ACE wraps on both leg with bilateral edema. Neurologic: Mental status: Alert, oriented, thought content appropriate    Assessment and Plan:   1. 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 Right lower leg cellulitis - MRSA / Serratia growing on culture. On IV Zosyn although it appears the organisms are resistant to PCN. 3.  HTN - SBP has been < 100 since admission. 4.  Hyperlipidemia - on Lipitor. 5. PVD - on Plavix. 6.  BPH - on Flomax. 7.  Vitamin D deficiency - on replacement. 8.  COPD - no complaints of increased SOB. Recommend:  1. Would hold Lisinopril for now and watch BP / renal function. 2.  Stop Zosyn and change to IV Rocephin / Doxycyline. 3.  Repeat CXR today. 4.  Place on EZPAP QID. 5.  Continue wound care as directed by Dr. Griselda Padgett.       Patient Active Problem List:     Ischemia of extremity     S/P peripheral artery angioplasty     Moderate malnutrition (HCC)     Urinary retention     BPH with obstruction/lower urinary tract symptoms     Dysuria     Frequency of urination     Cellulitis of left leg     Left leg cellulitis     Nocturia     Cellulitis     Renal failure associated with renal vascular disease     Severe malnutrition (HCC)     Bilateral lower extremity edema     Abnormal ECG     Acute diastolic HF (heart failure), NYHA class 4 (HCC)     Cellulitis of left lower leg      Michael Landaverde MD, MD  Rounding Hospitalist

## 2021-07-07 NOTE — PLAN OF CARE
Problem: Falls - Risk of:  Goal: Will remain free from falls  Description: Will remain free from falls  7/7/2021 1311 by Krishan Stacy RN  Outcome: Ongoing     Problem: Pain:  Goal: Pain level will decrease  Description: Pain level will decrease  7/7/2021 1311 by Krishan Stacy RN  Outcome: Ongoing    Problem: Skin Integrity:  Goal: Will show no infection signs and symptoms  Description: Will show no infection signs and symptoms  7/7/2021 1311 by Krishan Stacy RN  Outcome: Ongoing     Problem:  Activity Intolerance:  Goal: Ability to tolerate increased activity will improve  Description: Ability to tolerate increased activity will improve  Outcome: Ongoing     Problem: Airway Clearance - Ineffective:  Goal: Ability to maintain a clear airway will improve  Description: Ability to maintain a clear airway will improve  Outcome: Ongoing     Problem: Breathing Pattern - Ineffective:  Goal: Ability to achieve and maintain a regular respiratory rate will improve  Description: Ability to achieve and maintain a regular respiratory rate will improve  Outcome: Ongoing

## 2021-07-07 NOTE — PLAN OF CARE
Pt goes from chair to the bed and then from bed to BR for BM. Medium soft BM noted in commode. Back to bed with SBA of 2. Takes pain medication as ordered for RLE pain.

## 2021-07-07 NOTE — PROGRESS NOTES
Comprehensive Nutrition Assessment    Type and Reason for Visit:  Initial    Nutrition Recommendations/Plan:  Encourage protein foods     Nutrition Assessment:  Chronic moderate malnutrition r/t inadequate nutrient intakes, AEB chronic fat and muscle losses in orbits, temples, buccal area and temples (other body areas not available). Wound areas and cellulitis with increased energy needs. Taking PO well per I/O data and with significant weight increases r/t BLE edema (admission screening reflected poor intakes pta). Improving renal indices with hydration    Malnutrition Assessment:  Malnutrition Status: Moderate malnutrition    Context:  Chronic Illness     Findings of the 6 clinical characteristics of malnutrition:  Energy Intake:  No significant decrease in energy intake  Weight Loss:  No significant weight loss     Body Fat Loss:  1 - Mild body fat loss (moderate) Buccal region, Orbital   Muscle Mass Loss:  1 - Mild muscle mass loss (moderate) Temples (temporalis)  Fluid Accumulation:  1 - Mild (to moderate) Extremities   Strength:  Not Performed    Estimated Daily Nutrient Needs:  Energy (kcal):  4227-1945 (20-23); Weight Used for Energy Requirements:  Current     Protein (g):   (1.5-1.8); Weight Used for Protein Requirements:  Ideal        Fluid (ml/day):  2200; Method Used for Fluid Requirements:  1 ml/kcal      Nutrition Related Findings:  moderate fat and muscle losses (appear chronic)      Wounds:   (cellulitis with bilateral pretibial wounds)       Current Nutrition Therapies:    ADULT DIET;  Regular    Anthropometric Measures:  · Height: 5' 4\" (162.6 cm)  · Current Body Weight: 220 lb 1.6 oz (99.8 kg)   · Admission Body Weight: 209 lb 6.4 oz (95 kg)    · Usual Body Weight: 210 lb 12.8 oz (95.6 kg) (last November, however, last admission up to 218.8#)     · Ideal Body Weight: 130 lbs; % Ideal Body Weight 169.3 %   · BMI: 37.8  · Adjusted Body Weight:  ; No Adjustment   · BMI Categories: Obese Class 2 (BMI 35.0 -39.9)       Nutrition Diagnosis:   · Moderate malnutrition related to inadequate protein-energy intake as evidenced by moderate loss of subcutaneous fat, moderate muscle loss    Lab Results   Component Value Date     07/06/2021    K 3.9 07/06/2021     07/06/2021    CO2 26 07/06/2021    BUN 28 (H) 07/06/2021    CREATININE 1.47 (H) 07/06/2021    GLUCOSE 183 (H) 07/06/2021    CALCIUM 8.3 (L) 07/06/2021    PROT 6.7 06/14/2021    LABALBU 3.7 06/14/2021    BILITOT 0.35 06/14/2021    ALKPHOS 67 06/14/2021    AST 19 06/14/2021    ALT 12 06/14/2021    LABGLOM 45 (L) 07/06/2021    GFRAA 54 (L) 07/06/2021    GLOB NOT REPORTED 10/29/2020     No results found for: LABA1C  No results found for: EAG  No results found for: VITD25    Nutrition Interventions:   Food and/or Nutrient Delivery:  Continue Current Diet  Nutrition Education/Counseling:  No recommendation at this time   Coordination of Nutrition Care:  Continue to monitor while inpatient    Goals:  PO > 75% meals with good protein sources       Nutrition Monitoring and Evaluation:   Behavioral-Environmental Outcomes:  Knowledge or Skill   Food/Nutrient Intake Outcomes:  Food and Nutrient Intake  Physical Signs/Symptoms Outcomes:  Biochemical Data, Weight, Fluid Status or Edema     Discharge Planning:     Too soon to determine     Electronically signed by Lima Robertson RD, LD on 7/7/21 at 8:22 AM EDT    Contact: [phone]

## 2021-07-07 NOTE — PROGRESS NOTES
Daughter at bedside and pt in chair eating cheesecake. Denies pain. Uses urinal. Up from chair to bed. Refused walker, does well.

## 2021-07-07 NOTE — PROGRESS NOTES
Dot #3            Subjective: Patient reports he feels unchanged. His leg hurts a little less , and he is pleased to report having had a satisfactory bowel movement. Objective: chest slow expiration with expiratory wheeze. Card rrr MRM carotids no bruits. No ankle oedema. Dressing taken down to skin level right leg. Photographs taken of areas of skin breakdown. C and s report come back showing  MRSA staph aureus, serratia macesans, penera. Wounds cleansed then dried and betadine reapplied. sterile gauze and ace wrapped. Bmp and bnp reviewed. crx :  I review cardiomegally no effusions . weight 220. Afebrile vs stable but blood pressure lags. Consultation Dr. Giles Chapman appreciated. Last echo cardia study shows EF 55% but patient tolerates rehydration  Marginally. Cellulitis slightly improved. I did dressing change and wound care. Assessment: area of ulceration right lower extremity multiple pathogens present, copd, bronchitis, dehydration with prerenal insufficiency. Pulmonary hypertension. Plan: antibiotic switch in accordance  With c and s studies. Repeat cxr.       Deborah Rice MD

## 2021-07-07 NOTE — PLAN OF CARE
Problem: Pain:  Goal: Pain level will decrease  Description: Pain level will decrease  7/7/2021 1618 by Melvin Short RN  Outcome: Ongoing     Problem: Skin Integrity:  Goal: Will show no infection signs and symptoms  Description: Will show no infection signs and symptoms  7/7/2021 1618 by Melvin Short RN  Outcome: Ongoing     Problem: Airway Clearance - Ineffective:  Goal: Ability to maintain a clear airway will improve  Description: Ability to maintain a clear airway will improve  7/7/2021 1618 by Melvin Short RN  Outcome: Ongoing    Problem: Breathing Pattern - Ineffective:  Goal: Ability to achieve and maintain a regular respiratory rate will improve  Description: Ability to achieve and maintain a regular respiratory rate will improve  7/7/2021 1618 by Melvin Short RN  Outcome: Ongoing

## 2021-07-08 LAB
ANION GAP SERPL CALCULATED.3IONS-SCNC: 5 MMOL/L (ref 9–17)
BUN BLDV-MCNC: 20 MG/DL (ref 8–23)
BUN/CREAT BLD: 13 (ref 9–20)
CALCIUM SERPL-MCNC: 8.7 MG/DL (ref 8.6–10.4)
CHLORIDE BLD-SCNC: 111 MMOL/L (ref 98–107)
CO2: 25 MMOL/L (ref 20–31)
CREAT SERPL-MCNC: 1.49 MG/DL (ref 0.7–1.2)
GFR AFRICAN AMERICAN: 54 ML/MIN
GFR NON-AFRICAN AMERICAN: 44 ML/MIN
GFR SERPL CREATININE-BSD FRML MDRD: ABNORMAL ML/MIN/{1.73_M2}
GFR SERPL CREATININE-BSD FRML MDRD: ABNORMAL ML/MIN/{1.73_M2}
GLUCOSE BLD-MCNC: 115 MG/DL (ref 70–99)
POTASSIUM SERPL-SCNC: 4.3 MMOL/L (ref 3.7–5.3)
SODIUM BLD-SCNC: 141 MMOL/L (ref 135–144)

## 2021-07-08 PROCEDURE — 2580000003 HC RX 258: Performed by: SURGERY

## 2021-07-08 PROCEDURE — 6370000000 HC RX 637 (ALT 250 FOR IP): Performed by: SURGERY

## 2021-07-08 PROCEDURE — 1200000000 HC SEMI PRIVATE

## 2021-07-08 PROCEDURE — 93005 ELECTROCARDIOGRAM TRACING: CPT | Performed by: INTERNAL MEDICINE

## 2021-07-08 PROCEDURE — 6360000002 HC RX W HCPCS: Performed by: INTERNAL MEDICINE

## 2021-07-08 PROCEDURE — 36415 COLL VENOUS BLD VENIPUNCTURE: CPT

## 2021-07-08 PROCEDURE — 94761 N-INVAS EAR/PLS OXIMETRY MLT: CPT

## 2021-07-08 PROCEDURE — 6370000000 HC RX 637 (ALT 250 FOR IP): Performed by: INTERNAL MEDICINE

## 2021-07-08 PROCEDURE — 2580000003 HC RX 258: Performed by: INTERNAL MEDICINE

## 2021-07-08 PROCEDURE — 2500000003 HC RX 250 WO HCPCS: Performed by: INTERNAL MEDICINE

## 2021-07-08 PROCEDURE — 80048 BASIC METABOLIC PNL TOTAL CA: CPT

## 2021-07-08 PROCEDURE — 94640 AIRWAY INHALATION TREATMENT: CPT

## 2021-07-08 RX ORDER — LEVALBUTEROL INHALATION SOLUTION 1.25 MG/3ML
1.25 SOLUTION RESPIRATORY (INHALATION)
Status: DISCONTINUED | OUTPATIENT
Start: 2021-07-08 | End: 2021-07-14 | Stop reason: HOSPADM

## 2021-07-08 RX ORDER — POLYETHYLENE GLYCOL 3350 17 G/17G
17 POWDER, FOR SOLUTION ORAL DAILY
Status: DISCONTINUED | OUTPATIENT
Start: 2021-07-08 | End: 2021-07-10

## 2021-07-08 RX ADMIN — DOXYCYCLINE 100 MG: 100 INJECTION, POWDER, LYOPHILIZED, FOR SOLUTION INTRAVENOUS at 21:30

## 2021-07-08 RX ADMIN — TAMSULOSIN HYDROCHLORIDE 0.4 MG: 0.4 CAPSULE ORAL at 07:52

## 2021-07-08 RX ADMIN — CLOPIDOGREL BISULFATE 75 MG: 75 TABLET ORAL at 07:52

## 2021-07-08 RX ADMIN — IPRATROPIUM BROMIDE AND ALBUTEROL SULFATE 1 AMPULE: .5; 3 SOLUTION RESPIRATORY (INHALATION) at 17:27

## 2021-07-08 RX ADMIN — SODIUM CHLORIDE, PRESERVATIVE FREE 10 ML: 5 INJECTION INTRAVENOUS at 21:31

## 2021-07-08 RX ADMIN — SODIUM CHLORIDE, PRESERVATIVE FREE 10 ML: 5 INJECTION INTRAVENOUS at 10:08

## 2021-07-08 RX ADMIN — Medication 1200 UNITS: at 07:51

## 2021-07-08 RX ADMIN — Medication 1 CAPSULE: at 16:54

## 2021-07-08 RX ADMIN — IPRATROPIUM BROMIDE AND ALBUTEROL SULFATE 1 AMPULE: .5; 3 SOLUTION RESPIRATORY (INHALATION) at 11:21

## 2021-07-08 RX ADMIN — IPRATROPIUM BROMIDE AND ALBUTEROL SULFATE 1 AMPULE: .5; 3 SOLUTION RESPIRATORY (INHALATION) at 05:55

## 2021-07-08 RX ADMIN — DOXYCYCLINE 100 MG: 100 INJECTION, POWDER, LYOPHILIZED, FOR SOLUTION INTRAVENOUS at 10:07

## 2021-07-08 RX ADMIN — Medication 1 CAPSULE: at 07:52

## 2021-07-08 RX ADMIN — GABAPENTIN 300 MG: 300 CAPSULE ORAL at 21:30

## 2021-07-08 RX ADMIN — CHOLECALCIFEROL TAB 25 MCG (1000 UNIT) 1000 UNITS: 25 TAB at 07:52

## 2021-07-08 RX ADMIN — POLYETHYLENE GLYCOL 3350 17 G: 17 POWDER, FOR SOLUTION ORAL at 07:51

## 2021-07-08 RX ADMIN — CEFTRIAXONE SODIUM 1000 MG: 1 INJECTION, POWDER, FOR SOLUTION INTRAMUSCULAR; INTRAVENOUS at 07:52

## 2021-07-08 RX ADMIN — LEVALBUTEROL HYDROCHLORIDE 1.25 MG: 1.25 SOLUTION RESPIRATORY (INHALATION) at 18:02

## 2021-07-08 RX ADMIN — GABAPENTIN 300 MG: 300 CAPSULE ORAL at 07:52

## 2021-07-08 RX ADMIN — OXYCODONE HYDROCHLORIDE AND ACETAMINOPHEN 500 MG: 500 TABLET ORAL at 07:53

## 2021-07-08 RX ADMIN — HYDROXYZINE PAMOATE 50 MG: 25 CAPSULE ORAL at 21:29

## 2021-07-08 RX ADMIN — IPRATROPIUM BROMIDE AND ALBUTEROL SULFATE 1 AMPULE: .5; 3 SOLUTION RESPIRATORY (INHALATION) at 21:37

## 2021-07-08 RX ADMIN — ATORVASTATIN CALCIUM 40 MG: 40 TABLET, FILM COATED ORAL at 21:29

## 2021-07-08 RX ADMIN — TRAMADOL HYDROCHLORIDE 50 MG: 50 TABLET, FILM COATED ORAL at 21:29

## 2021-07-08 ASSESSMENT — PAIN DESCRIPTION - PROGRESSION
CLINICAL_PROGRESSION: GRADUALLY WORSENING

## 2021-07-08 ASSESSMENT — PAIN SCALES - GENERAL
PAINLEVEL_OUTOF10: 0
PAINLEVEL_OUTOF10: 5
PAINLEVEL_OUTOF10: 0

## 2021-07-08 NOTE — PROGRESS NOTES
Noted to be coughing when eating, SOB after. Respiratory in for breathing treatment, notes new irregular heartbeat. EKG pending. ST eval ordered.

## 2021-07-08 NOTE — PROGRESS NOTES
Tolerates ambulation back from bathroom poorly. Short of breath, acting exhausted. Nurse asks what is bothering patient, he states, \"Well, maybe it's my time. \"

## 2021-07-08 NOTE — CONSULTS
Hospitalist Consult Note    7/8/2021 6:48 AM    Subjective:   Admit Date: 7/4/2021  PCP: Edmond Rodriguez MD    Physician requesting consult:  Dr. Minna Moscoso    Reason for Consult:  Medical management for CKD, cellulitis. Interval History: Carloz Carney has no complaints this am other than slow bowels. He states he gets occasional pain in the right lower leg that comes and goes. Appetite is good, no nausea. No chest pain or SOB. Diet: ADULT DIET; Regular    Medications:   Scheduled Meds:   sodium chloride flush  5-40 mL Intravenous BID    cefTRIAXone (ROCEPHIN) IV  1,000 mg Intravenous Q24H    doxycycline (VIBRAMYCIN) IV  100 mg Intravenous Q12H    lactobacillus  1 capsule Oral BID WC    ipratropium-albuterol  1 ampule Inhalation 4x daily    tamsulosin  0.4 mg Oral Daily    atorvastatin  40 mg Oral Nightly    clopidogrel  75 mg Oral Daily    gabapentin  300 mg Oral BID    [Held by provider] lisinopril  10 mg Oral Daily    vitamin C  500 mg Oral Daily    Vitamin D  1,000 Units Oral Daily    vitamin E  1,200 Units Oral Daily     Continuous Infusions:  CBC: No results for input(s): WBC, HGB, PLT in the last 72 hours. BMP:    Recent Labs     07/06/21  0955 07/07/21  1015 07/08/21  0507    139 141   K 3.9 4.0 4.3    108* 111*   CO2 26 25 25   BUN 28* 21 20   CREATININE 1.47* 1.42* 1.49*   GLUCOSE 183* 174* 115*     Hepatic: No results for input(s): AST, ALT, ALB, BILITOT, ALKPHOS in the last 72 hours. Troponin: No results for input(s): TROPONINI in the last 72 hours. BNP: No results for input(s): BNP in the last 72 hours. Lipids: No results for input(s): CHOL, HDL in the last 72 hours. Invalid input(s): LDLCALCU  INR: No results for input(s): INR in the last 72 hours.     Objective:   Vitals: BP (!) 95/51   Pulse 66   Temp 97.9 °F (36.6 °C) (Oral)   Resp 20   Ht 5' 4\" (1.626 m)   Wt 220 lb 6.4 oz (100 kg)   SpO2 97%   BMI 37.83 kg/m²   General appearance: alert and cooperative with exam  HEENT: Head: Normocephalic, no lesions, without obvious abnormality. Eye: Normal external eye, conjunctiva, lids cornea, MELECIO. Nose: Normal external nose, mucus membranes and septum. Neck: no adenopathy, no carotid bruit and supple, symmetrical, trachea midline  Lungs: Few scattered expiratory wheezes. Heart: regular rate and rhythm, S1, S2 normal and II/VI systolic murmur  Abdomen: soft, non-tender; bowel sounds normal; no masses,  no organomegaly and over wt. Extremities: ACE wraps on bilateral.  Neurologic: Mental status: Alert, oriented, thought content appropriate    Assessment and Plan:   1. 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 Right lower leg cellulitis - MRSA / Serratia growing on culture. Antibiotic changed from Zosyn to Rocephin / Doxycyline. 3.  HTN - SBP has been < 100 since admission. Lisinopril on hold. Appears well hydrated. 4.  Hyperlipidemia - on Lipitor. 5. PVD - on Plavix. 6.  BPH - on Flomax. 7.  Vitamin D deficiency - on replacement. 8.  COPD - no complaints of increased SOB. 9.  Constipation    Plan:  1. Add on Miralax daily. 2.  Continue the current treatment.     Patient Active Problem List:     Ischemia of extremity     S/P peripheral artery angioplasty     Moderate malnutrition (HCC)     Urinary retention     BPH with obstruction/lower urinary tract symptoms     Dysuria     Frequency of urination     Cellulitis of left leg     Left leg cellulitis     Nocturia     Cellulitis     Renal failure associated with renal vascular disease     Severe malnutrition (HCC)     Bilateral lower extremity edema     Abnormal ECG     Acute diastolic HF (heart failure), NYHA class 4 (HCC)     Cellulitis of left lower leg      Yrn Ivey MD, MD  Rounding Hospitalist

## 2021-07-09 ENCOUNTER — APPOINTMENT (OUTPATIENT)
Dept: GENERAL RADIOLOGY | Age: 86
DRG: 602 | End: 2021-07-09
Payer: MEDICARE

## 2021-07-09 LAB
ABSOLUTE EOS #: 0.4 K/UL (ref 0–0.4)
ABSOLUTE IMMATURE GRANULOCYTE: ABNORMAL K/UL (ref 0–0.3)
ABSOLUTE LYMPH #: 1.1 K/UL (ref 1–4.8)
ABSOLUTE MONO #: 0.8 K/UL (ref 0–1)
ANION GAP SERPL CALCULATED.3IONS-SCNC: 6 MMOL/L (ref 9–17)
BASOPHILS # BLD: 1 % (ref 0–2)
BASOPHILS ABSOLUTE: 0.1 K/UL (ref 0–0.2)
BNP INTERPRETATION: ABNORMAL
BUN BLDV-MCNC: 20 MG/DL (ref 8–23)
BUN/CREAT BLD: 16 (ref 9–20)
CALCIUM SERPL-MCNC: 8.7 MG/DL (ref 8.6–10.4)
CHLORIDE BLD-SCNC: 109 MMOL/L (ref 98–107)
CO2: 24 MMOL/L (ref 20–31)
CREAT SERPL-MCNC: 1.24 MG/DL (ref 0.7–1.2)
DIFFERENTIAL TYPE: YES
EKG ATRIAL RATE: 83 BPM
EKG P AXIS: 44 DEGREES
EKG P-R INTERVAL: 224 MS
EKG Q-T INTERVAL: 364 MS
EKG QRS DURATION: 68 MS
EKG QTC CALCULATION (BAZETT): 427 MS
EKG R AXIS: -28 DEGREES
EKG T AXIS: -2 DEGREES
EKG VENTRICULAR RATE: 83 BPM
EOSINOPHILS RELATIVE PERCENT: 6 % (ref 0–5)
GFR AFRICAN AMERICAN: >60 ML/MIN
GFR NON-AFRICAN AMERICAN: 55 ML/MIN
GFR SERPL CREATININE-BSD FRML MDRD: ABNORMAL ML/MIN/{1.73_M2}
GFR SERPL CREATININE-BSD FRML MDRD: ABNORMAL ML/MIN/{1.73_M2}
GLUCOSE BLD-MCNC: 99 MG/DL (ref 70–99)
HCT VFR BLD CALC: 28.9 % (ref 41–53)
HEMOGLOBIN: 9.8 G/DL (ref 13.5–17.5)
IMMATURE GRANULOCYTES: ABNORMAL %
LYMPHOCYTES # BLD: 18 % (ref 13–44)
MCH RBC QN AUTO: 31.2 PG (ref 26–34)
MCHC RBC AUTO-ENTMCNC: 33.9 G/DL (ref 31–37)
MCV RBC AUTO: 92.1 FL (ref 80–100)
MONOCYTES # BLD: 13 % (ref 5–9)
NRBC AUTOMATED: ABNORMAL PER 100 WBC
PDW BLD-RTO: 14.6 % (ref 12.1–15.2)
PLATELET # BLD: 205 K/UL (ref 140–450)
PLATELET ESTIMATE: ABNORMAL
PMV BLD AUTO: ABNORMAL FL (ref 6–12)
POTASSIUM SERPL-SCNC: 4.2 MMOL/L (ref 3.7–5.3)
PRO-BNP: 1447 PG/ML
RBC # BLD: 3.14 M/UL (ref 4.5–5.9)
RBC # BLD: ABNORMAL 10*6/UL
SEG NEUTROPHILS: 62 % (ref 39–75)
SEGMENTED NEUTROPHILS ABSOLUTE COUNT: 4 K/UL (ref 2.1–6.5)
SODIUM BLD-SCNC: 139 MMOL/L (ref 135–144)
TSH SERPL DL<=0.05 MIU/L-ACNC: 2.65 MIU/L (ref 0.3–5)
WBC # BLD: 6.3 K/UL (ref 3.5–11)
WBC # BLD: ABNORMAL 10*3/UL

## 2021-07-09 PROCEDURE — 71045 X-RAY EXAM CHEST 1 VIEW: CPT

## 2021-07-09 PROCEDURE — 6370000000 HC RX 637 (ALT 250 FOR IP): Performed by: INTERNAL MEDICINE

## 2021-07-09 PROCEDURE — 80048 BASIC METABOLIC PNL TOTAL CA: CPT

## 2021-07-09 PROCEDURE — 94640 AIRWAY INHALATION TREATMENT: CPT

## 2021-07-09 PROCEDURE — 92610 EVALUATE SWALLOWING FUNCTION: CPT

## 2021-07-09 PROCEDURE — 84443 ASSAY THYROID STIM HORMONE: CPT

## 2021-07-09 PROCEDURE — 1200000000 HC SEMI PRIVATE

## 2021-07-09 PROCEDURE — 6370000000 HC RX 637 (ALT 250 FOR IP): Performed by: SURGERY

## 2021-07-09 PROCEDURE — 83880 ASSAY OF NATRIURETIC PEPTIDE: CPT

## 2021-07-09 PROCEDURE — 6360000002 HC RX W HCPCS: Performed by: INTERNAL MEDICINE

## 2021-07-09 PROCEDURE — 2500000003 HC RX 250 WO HCPCS: Performed by: INTERNAL MEDICINE

## 2021-07-09 PROCEDURE — 2580000003 HC RX 258: Performed by: INTERNAL MEDICINE

## 2021-07-09 PROCEDURE — 2580000003 HC RX 258: Performed by: SURGERY

## 2021-07-09 PROCEDURE — 93010 ELECTROCARDIOGRAM REPORT: CPT | Performed by: INTERNAL MEDICINE

## 2021-07-09 PROCEDURE — 36415 COLL VENOUS BLD VENIPUNCTURE: CPT

## 2021-07-09 PROCEDURE — 85025 COMPLETE CBC W/AUTO DIFF WBC: CPT

## 2021-07-09 RX ORDER — METHYLPREDNISOLONE SODIUM SUCCINATE 40 MG/ML
40 INJECTION, POWDER, LYOPHILIZED, FOR SOLUTION INTRAMUSCULAR; INTRAVENOUS EVERY 8 HOURS
Status: DISCONTINUED | OUTPATIENT
Start: 2021-07-09 | End: 2021-07-11

## 2021-07-09 RX ORDER — MIDODRINE HYDROCHLORIDE 5 MG/1
5 TABLET ORAL
Status: DISCONTINUED | OUTPATIENT
Start: 2021-07-09 | End: 2021-07-14 | Stop reason: HOSPADM

## 2021-07-09 RX ORDER — PANTOPRAZOLE SODIUM 40 MG/1
40 TABLET, DELAYED RELEASE ORAL
Status: DISCONTINUED | OUTPATIENT
Start: 2021-07-09 | End: 2021-07-14 | Stop reason: HOSPADM

## 2021-07-09 RX ADMIN — TRAMADOL HYDROCHLORIDE 50 MG: 50 TABLET, FILM COATED ORAL at 21:47

## 2021-07-09 RX ADMIN — CHOLECALCIFEROL TAB 25 MCG (1000 UNIT) 1000 UNITS: 25 TAB at 08:00

## 2021-07-09 RX ADMIN — GABAPENTIN 300 MG: 300 CAPSULE ORAL at 08:00

## 2021-07-09 RX ADMIN — IPRATROPIUM BROMIDE AND ALBUTEROL SULFATE 1 AMPULE: .5; 3 SOLUTION RESPIRATORY (INHALATION) at 16:39

## 2021-07-09 RX ADMIN — CEFTRIAXONE SODIUM 1000 MG: 1 INJECTION, POWDER, FOR SOLUTION INTRAMUSCULAR; INTRAVENOUS at 07:58

## 2021-07-09 RX ADMIN — ATORVASTATIN CALCIUM 40 MG: 40 TABLET, FILM COATED ORAL at 21:48

## 2021-07-09 RX ADMIN — METHYLPREDNISOLONE SODIUM SUCCINATE 40 MG: 40 INJECTION, POWDER, FOR SOLUTION INTRAMUSCULAR; INTRAVENOUS at 21:48

## 2021-07-09 RX ADMIN — CLOPIDOGREL BISULFATE 75 MG: 75 TABLET ORAL at 08:00

## 2021-07-09 RX ADMIN — HYDROXYZINE PAMOATE 50 MG: 25 CAPSULE ORAL at 21:47

## 2021-07-09 RX ADMIN — IPRATROPIUM BROMIDE AND ALBUTEROL SULFATE 1 AMPULE: .5; 3 SOLUTION RESPIRATORY (INHALATION) at 10:53

## 2021-07-09 RX ADMIN — MIDODRINE HYDROCHLORIDE 5 MG: 5 TABLET ORAL at 16:47

## 2021-07-09 RX ADMIN — LEVALBUTEROL HYDROCHLORIDE 1.25 MG: 1.25 SOLUTION RESPIRATORY (INHALATION) at 01:43

## 2021-07-09 RX ADMIN — DOXYCYCLINE 100 MG: 100 INJECTION, POWDER, LYOPHILIZED, FOR SOLUTION INTRAVENOUS at 21:48

## 2021-07-09 RX ADMIN — IPRATROPIUM BROMIDE AND ALBUTEROL SULFATE 1 AMPULE: .5; 3 SOLUTION RESPIRATORY (INHALATION) at 21:25

## 2021-07-09 RX ADMIN — DOXYCYCLINE 100 MG: 100 INJECTION, POWDER, LYOPHILIZED, FOR SOLUTION INTRAVENOUS at 08:47

## 2021-07-09 RX ADMIN — MIDODRINE HYDROCHLORIDE 5 MG: 5 TABLET ORAL at 11:11

## 2021-07-09 RX ADMIN — SODIUM CHLORIDE, PRESERVATIVE FREE 10 ML: 5 INJECTION INTRAVENOUS at 21:48

## 2021-07-09 RX ADMIN — PANTOPRAZOLE SODIUM 40 MG: 40 TABLET, DELAYED RELEASE ORAL at 14:46

## 2021-07-09 RX ADMIN — SODIUM CHLORIDE, PRESERVATIVE FREE 10 ML: 5 INJECTION INTRAVENOUS at 07:59

## 2021-07-09 RX ADMIN — METHYLPREDNISOLONE SODIUM SUCCINATE 40 MG: 40 INJECTION, POWDER, FOR SOLUTION INTRAMUSCULAR; INTRAVENOUS at 06:42

## 2021-07-09 RX ADMIN — METHYLPREDNISOLONE SODIUM SUCCINATE 40 MG: 40 INJECTION, POWDER, FOR SOLUTION INTRAMUSCULAR; INTRAVENOUS at 14:46

## 2021-07-09 RX ADMIN — TAMSULOSIN HYDROCHLORIDE 0.4 MG: 0.4 CAPSULE ORAL at 07:59

## 2021-07-09 RX ADMIN — POLYETHYLENE GLYCOL 3350 17 G: 17 POWDER, FOR SOLUTION ORAL at 07:59

## 2021-07-09 RX ADMIN — Medication 1200 UNITS: at 07:59

## 2021-07-09 RX ADMIN — OXYCODONE HYDROCHLORIDE AND ACETAMINOPHEN 500 MG: 500 TABLET ORAL at 08:00

## 2021-07-09 RX ADMIN — Medication 1 CAPSULE: at 08:00

## 2021-07-09 RX ADMIN — GABAPENTIN 300 MG: 300 CAPSULE ORAL at 21:47

## 2021-07-09 RX ADMIN — Medication 1 CAPSULE: at 16:47

## 2021-07-09 RX ADMIN — MIDODRINE HYDROCHLORIDE 5 MG: 5 TABLET ORAL at 07:59

## 2021-07-09 ASSESSMENT — PAIN DESCRIPTION - PROGRESSION
CLINICAL_PROGRESSION: GRADUALLY WORSENING

## 2021-07-09 ASSESSMENT — PAIN DESCRIPTION - LOCATION
LOCATION: LEG

## 2021-07-09 ASSESSMENT — PAIN DESCRIPTION - DESCRIPTORS
DESCRIPTORS: ACHING

## 2021-07-09 ASSESSMENT — PAIN DESCRIPTION - FREQUENCY
FREQUENCY: INTERMITTENT

## 2021-07-09 ASSESSMENT — PAIN DESCRIPTION - ORIENTATION
ORIENTATION: RIGHT

## 2021-07-09 ASSESSMENT — PAIN DESCRIPTION - PAIN TYPE
TYPE: ACUTE PAIN

## 2021-07-09 ASSESSMENT — PAIN SCALES - GENERAL
PAINLEVEL_OUTOF10: 4
PAINLEVEL_OUTOF10: 1
PAINLEVEL_OUTOF10: 0
PAINLEVEL_OUTOF10: 1
PAINLEVEL_OUTOF10: 0
PAINLEVEL_OUTOF10: 3

## 2021-07-09 NOTE — PROGRESS NOTES
SWING BED PROGRAM PRE-ADMISSION ASSESSMENT  (TRADITIONAL MEDICARE PATIENTS)  Patient Name: Bebo Gilbert      : 1930  (80 y.o.) Gender: male   Room: [address] [place] [address] MRN: [id number]      Inpatient Admission Date: 2021 Date & Time of Referral: 2021     Referred By: Gregoria Norman MD Referred from:  [x] W    [] Other:     # of Skilled Care Days Used in Last 60 days: 0 Insurance: [x]  Medicare                      []  Secondary - Type:     Present Condition/Diagnosis:    Cellulitis [L03.90]  Cellulitis of left lower leg [L03.116]      Previous Medical History:   Past Medical History:   Diagnosis Date    Arthritis     COPD (chronic obstructive pulmonary disease) ([facility] [address]     Dependent edema     Hyperlipidemia     Hypertension     Psoriasis         ADL Performance Last Seven (7) Days (Please Score)   Patients performance over all shifts during last seven (7) days     0 = Independent - No help or oversight  1 = Supervision - Oversight, encouragement or cueing provided  2 = Limited Assist -Highly involved in activity; assistance in guided maneuvering of limbs or other non-weight-bearing assistance  3 = Extensive Assist - Receives physical help in guided maneuvering of limbs or other non-weight bearing assistance  4 = Total Dependence - Full staff performance of activity   7 = Activity occurred only once or twice  8 = Activity did not occur - Activity did not occur or family and/or non- facility staff provided care 100% of the time for that activity over a 7-day period.      SCORE   BED MOBILITY - How client moves to and from lying position, turns side to side, and positions body while in bed 2   TRANSFER - How resident moves between surfaces - to/from: bed, chair, wheelchair, standing position (EXCLUDE to/from bath/toilet) 3   TOILET USE - How client uses the toilet room (or commode, bedpan, urinal);transfers on/off toilet, cleanses, changes pad, manages ostomy or catheter, adjusts clothes 3   EATING - How client eats and drinks (regardless of skill). Includes intake of nourishment by other means (e.g., tube feeding, total parenteral nutrition) 0   Estimated Pre-Admission ADL Value: 8     PATIENT WILL RECEIVE THE FOLLOWING SKILLED SERVICES AS A SWING BED PATIENT:       REHABILITATION (PT/OT/SP)    [] ULTRA HIGH 720 or more minutes minimum per week of at least two (2) disciplines - 1st for at least five (5) days and 2nd for at least three (3) days   [] VERY HIGH 500 or more minutes minimum per week of at least one (1) discipline for at least five (5) days   [] HIGH 325 or more minutes minimum per week of at least one (1) discipline for at least five (5) days   [x] MEDIUM 150 or more minutes minimum per week at least five (5) days of any combination with three (3) therapies   [] LOW Restorative nursing at least six (6) days, two (2) activities, or therapies for at least three (3) days at least forty-five (45) minute per week minimum services. EXTENSIVE SERVICES   [] Tracheostomy Care   [] Ventilator/Respirator   [] Infection Isolation   SPECIAL CARE HIGH   [] MS with ADL greater than or equal to 10  [] Quadriplegic with ADL greater than or equal to 5  [] emphysema/COPD and shortness of breath when lying flat  [] Fever w/at least one (1) of the following: [] dehydration [] pneumonia [] vomiting [] weight loss  [] feeding tube  [] Septicemia  [] Coma (not awake & completely dependent in ADL)  [] Diabetes and injections seven (7) days and Dr. order change two (2) or more days.   [] Parenteral/IV feeding  [x] respiratory therapy for seven (7) days   SPECIAL CARE LOW:   [] Respiratory Therapy  [] Ulcers (2+sites all stages) w/treatment  [] Multiple Sclerosis  [] Cerebral Palsy  [] Parkinsons Disease  [] Oxygen Therapy  [] Extensive care services w/ADL less than 6  [] Fever w/at least one (1) of the following: [] dehydration [] pneumonia [] vomiting [] weight loss  [] Foot Infection or open lesions on the foot

## 2021-07-09 NOTE — PROGRESS NOTES
Speech Language Pathology  Facility/Department: [address] MED SURG TELEMETRY   CLINICAL BEDSIDE SWALLOW EVALUATION    NAME: Husam Merida  : 1930  MRN: [id number]    ADMISSION DATE: 2021  ADMITTING DIAGNOSIS: has Ischemia of extremity; S/P peripheral artery angioplasty; Moderate malnutrition ([address] Urinary retention; BPH with obstruction/lower urinary tract symptoms; Dysuria; Frequency of urination; Cellulitis of left leg; Left leg cellulitis; Nocturia; Cellulitis; Renal failure associated with renal vascular disease; Severe malnutrition ([address] Bilateral lower extremity edema; Abnormal ECG; Acute diastolic HF (heart failure), NYHA class 4 (HCC); and Cellulitis of left lower leg on their problem list.  ONSET DATE:  BSE completed 2021    Recent Chest Xray/CT of Chest: (2021)  Mild pulmonary vascular congestion  Date of Eval: 2021  Evaluating Therapist: ACE Garay    Current Diet level:  Current Diet : Regular  Current Liquid Diet : Thin    Primary Complaint  Patient Complaint: Patient reports no concern with swallowing. RN states patient choked on gum earlier this week and demonstrated some coughing during meal; however, she notes he coughs outside of mealtimes as well    Reason for Referral  Husam Merida was referred for a bedside swallow evaluation to assess the efficiency of his swallow function, identify signs and symptoms of aspiration and make recommendations regarding safe dietary consistencies, effective compensatory strategies, and safe eating environment. Impression  Dysphagia Diagnosis: Swallow function appears grossly intact  Dysphagia Outcome Severity Scale: Level 6: Within functional limits/Modified independence     Treatment Plan  Requires SLP Intervention: No    Recommended Diet and Intervention  Diet Solids Recommendation: Regular  Liquid Consistency Recommendation: Thin     Compensatory Swallowing Strategies  Compensatory Swallowing Strategies:  Alternate solids and liquids;Eat/Feed slowly;Upright as possible for all oral intake;Remain upright for 30-45 minutes after meals;Small bites/sips    General  Chart Reviewed: Yes  Subjective  Subjective: Patient alert and participated well during BSE  Behavior/Cognition: Alert; Cooperative  Temperature Spikes Noted: No  Respiratory Status: Room air  O2 Device: None (Room air)  Communication Observation: Functional  Follows Directions: Simple  Dentition: Edentulous  Patient Positioning: Upright in bed  Consistencies Administered: Reg solid; Thin;Dysphagia Pureed (Dysphagia I)    Vision/Hearing  Vision  Vision: Within Functional Limits  Hearing  Hearing: Within functional limits    Oral Motor Deficits  Oral/Motor  Oral Motor: Within functional limits    Oral Phase Dysfunction  Oral Phase  Oral Phase: Exceptions  Oral Phase Dysfunction  Impaired Mastication: Reg Solid  Oral Phase  Oral Phase - Comment: Prolonged mastication due to patient being edentulous and taking large bites; however patient deemed functional overall     Indicators of Pharyngeal Phase Dysfunction   Pharyngeal Phase  Pharyngeal Phase: WFL  Pharyngeal Phase   Pharyngeal: no clinically overt s/sx of pen/asp appreciated    Prognosis  Prognosis  Prognosis for safe diet advancement: good  Barriers to reach goals: age  Individuals consulted  Consulted and agree with results and recommendations: Patient;Dietitian    Education  Patient Education: reviewed standard swallow precautions with patient  Patient Education Response: Verbalizes understanding  Safety Devices in place: Yes  Type of devices: Call light within reach; Left in bed       Therapy Time  SLP Individual Minutes  Time In: 4090  Time Out: 1300  Minutes: 36        SLP consulted due to RN report of patient choking on gum and demonstrating coughing during mealtime; however, RN reports patient coughs outside of mealtimes as well. Patient see with lunch tray this date.   Patient demonstrated large bite sizes and demonstrated prolonged mastication due to being edentulous. Patient noted to take his time fully chewing the bites and alternated between bites and sips. No clinically overt s/sx of pen/asp appreciated throughout meal.  SLP provided education on standard swallow precautions and noted importance of watching if foods are too tough for patient to chew. Encouraged small bite sizes for foods which require more active mastication. Patient verbalized understanding of information presented. At this time, SLP recommends patient continue with regular solids and thin liquids. No skilled dysphagia therapy recommended.     Mary Barton M.A., [id number] [place] [address]   7/9/2021 1:04 PM

## 2021-07-09 NOTE — PROGRESS NOTES
[facility]  Swing Bed Evaluation for Certification for Elmer Baum 48 meets skilled criteria due to the need for skilled nursing supervision or skilled rehabilitation services listed below:   [x] Therapy; physical and occupational; for decreased strength, balance and self         care activities. [] Tpn    [] Trach care   [x] IV therapy   [x] Wound care    [] Other Skilled Need:        Pratima Joya lives [] Alone      [] With Spouse    [x] Other: Daughter  and plans on returning there at discharge. [x] Pt declines list of alternate providers, pt prefers to receive skilled care at [facility]  [] List of alternate providers given to patient, pt prefers to receive skilled care at [facility]  [] Not applicable, pt admitted to [facility] from  [facility] prefers this facility for skilled care and services can only be practically provided in a skilled nursing facility or swing bed hospital on an inpatient basis. Pratima Joya will require skilled care on a daily basis beginning 07/04/2021, if medically stable.   These services are for an ongoing condition for which Pratima Joya received inpatient care for at the hospital.        David Thompson,       Date: 07/09/2021

## 2021-07-09 NOTE — PROGRESS NOTES
Dot#5              Subjective: Patient reports that he feels about the same. He feel that his toes are not cooler. He is pleased his bowels are back moving regularly and he reports that he is getting enough to eat. He denies chest pains and reports that his shortness of breath is not worse than usual.      Objective: chest slow expiration  End terminal expiratory wheeze. Chest xray reviewed by myself. Approximately unchanged from last chest xray  No effusions. cardiomegaly Shmoo shape cardiac borders. Card rrr occ pvc MRM . Carotids no bruit. abdomen soft with normal bowel sounds. cervical kyphosis secondary to arthritis. Leg unwrapped to skin level. patient picks off scab from  Recent knee contusion prior to coming into hospital.  ulcerated skin areas ankle cleansed peroxide and photographed. Dermal buds now appearing in wounds . cellulitis is starting to recede. I apply betadine to wounds  And sterile dressings. I do dressing change. Afebrile vs stable. Patient weight up a little. Efforts of internal medicine greatly appreciate. Patient is still not diuresing. His acute on chronic renal failure is improved    Assessment: acute on chronic diastolic CHF, acute on chronic renal  Disease ,anemia,  MRSA,serratia, proteus cellulitis chronic venous stasis disease of lower extremities,copd      Plan: Protonix  Swallow ex sharon by speech therapy  All of the patients questions answered.   Edmond Rodriguez MD

## 2021-07-09 NOTE — CONSULTS
results for input(s): CHOL, HDL in the last 72 hours. Invalid input(s): LDLCALCU  INR: No results for input(s): INR in the last 72 hours. Objective:   Vitals: BP (!) 90/56   Pulse 78   Temp 98 °F (36.7 °C) (Oral)   Resp 18   Ht 5' 4\" (1.626 m)   Wt 225 lb 1.6 oz (102.1 kg)   SpO2 95%   BMI 38.64 kg/m²   General appearance: alert and cooperative with exam  HEENT: Head: Normocephalic, no lesions, without obvious abnormality. Eye: Normal external eye, conjunctiva, lids cornea, MELECIO. Nose: Normal external nose, mucus membranes and septum. Neck: no adenopathy and supple, symmetrical, trachea midline  Lungs: Bilateral expiratory wheezing. Heart: regular rate and rhythm, S1, S2 normal and difficult to appreciate secondary to wheezing. Abdomen: +BS, soft, no significant pain with palpation. Extremities: ACE wraps on both legs. No calf tenderness. Neurologic: Mental status: Alert, oriented, thought content appropriate    Assessment and Plan:   1.  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  Right lower leg cellulitis - MRSA / Serratia / Proteus growing on culture.  Antibiotic changed from Zosyn to Rocephin / Doxycyline. 3.  HTN - SBP has been < 100 since admission. Lisinopril on hold. 4.  Hyperlipidemia - on Lipitor. 5.  PVD - on Plavix. 6.  BPH - on Flomax. 7.  Vitamin D deficiency - on replacement. 8.  COPD - Increased wheezing / SOB. PRN Xopenex treatments ordered. Worried about fluid overload with the elevation in BNP but diuresis will likely drop his BP. 9.  Constipation - had a BM yesterday on Miralax. 10.  Hypotension -SBP down in the low 90's. 11.  Cough with eating - need to rule out aspiration. Plan:  1. CXR and labs today. 2.  Start on IV Solumedrol 40 mg every 8 hours. 3.  Place on Midodrine 5 mg every 8 hours. 4.  Speech therapy today. 5.  Further recommendations once CXR / Labs are back.       Patient Active Problem List: Ischemia of extremity     S/P peripheral artery angioplasty     Moderate malnutrition (HCC)     Urinary retention     BPH with obstruction/lower urinary tract symptoms     Dysuria     Frequency of urination     Cellulitis of left leg     Left leg cellulitis     Nocturia     Cellulitis     Renal failure associated with renal vascular disease     Severe malnutrition (HCC)     Bilateral lower extremity edema     Abnormal ECG     Acute diastolic HF (heart failure), NYHA class 4 (HCC)     Cellulitis of left lower leg      Leeanna Riojas MD, MD  [facility]

## 2021-07-09 NOTE — PROGRESS NOTES
Comprehensive Nutrition Assessment    Type and Reason for Visit:  Reassess    Nutrition Recommendations/Plan:  Continue to encourage oral intakes    Nutrition Assessment:  Continued chronic moderate malnutrition r/t inadequate nutrient intakes, AEB chronic fat and muscle losses in orbits, temples, buccal area and temples. Wound areas and cellulitis with increased energy needs. Taking PO well per I/O data and with significant weight increases r/t BLE edema. Continued improvement in renal indices with hydration. Wound photodocumentation noted. PO intakes appearing good, and observed SLP during lunch. No cough/choke etc with sandwich, pepsi and ice cream. Noted swingbed planning. Malnutrition Assessment:  Malnutrition Status: Moderate malnutrition    Context:  Chronic Illness     Findings of the 6 clinical characteristics of malnutrition:  Energy Intake:  No significant decrease in energy intake  Weight Loss:  No significant weight loss     Body Fat Loss:  1 - Mild body fat loss (moderate) Buccal region, Orbital   Muscle Mass Loss:  1 - Mild muscle mass loss (moderate) Temples (temporalis)  Fluid Accumulation:  1 - Mild (to moderate) Extremities   Strength:  Not Performed    Estimated Daily Nutrient Needs:  Energy (kcal):  5385-8488 (20-23); Weight Used for Energy Requirements:  Current     Protein (g):   (1.5-1.8); Weight Used for Protein Requirements:  Ideal        Fluid (ml/day):  2200; Method Used for Fluid Requirements:  1 ml/kcal      Nutrition Related Findings:  1-2+ BLE edema, moderate fat and muscle lossses (chronic)      Wounds:   (cellulitis with bilateral pretibial wounds)       Current Nutrition Therapies:    ADULT DIET;  Regular    Anthropometric Measures:  · Height: 5' 4\" (162.6 cm)  · Current Body Weight: 225 lb 1.6 oz (102.1 kg)   · Admission Body Weight: 209 lb 6.4 oz (95 kg)    · Usual Body Weight: 210 lb 12.8 oz (95.6 kg)     · Ideal Body Weight: 130 lbs; % Ideal Body Weight 169.3 % · BMI: 38.6  · Adjusted Body Weight:  ; No Adjustment   · BMI Categories: Obese Class 2 (BMI 35.0 -39.9)       Nutrition Diagnosis:   · Moderate malnutrition related to inadequate protein-energy intake as evidenced by moderate loss of subcutaneous fat, moderate muscle loss    Lab Results   Component Value Date     07/09/2021    K 4.2 07/09/2021     (H) 07/09/2021    CO2 24 07/09/2021    BUN 20 07/09/2021    CREATININE 1.24 (H) 07/09/2021    GLUCOSE 99 07/09/2021    CALCIUM 8.7 07/09/2021    PROT 6.7 06/14/2021    LABALBU 3.7 06/14/2021    BILITOT 0.35 06/14/2021    ALKPHOS 67 06/14/2021    AST 19 06/14/2021    ALT 12 06/14/2021    LABGLOM 55 (L) 07/09/2021    GFRAA >60 07/09/2021    GLOB NOT REPORTED 10/29/2020     Nutrition Interventions:   Food and/or Nutrient Delivery:  Continue Current Diet  Nutrition Education/Counseling:  No recommendation at this time   Coordination of Nutrition Care:  Continue to monitor while inpatient    Goals:  PO > 75% meals with good protein sources       Nutrition Monitoring and Evaluation:   Behavioral-Environmental Outcomes:  Knowledge or Skill   Food/Nutrient Intake Outcomes:  Food and Nutrient Intake  Physical Signs/Symptoms Outcomes:  Biochemical Data, Weight, Fluid Status or Edema     Discharge Planning:    No discharge needs at this time     Electronically signed by Sim Choi RD, LD on 7/9/21 at 1:03 PM EDT    Contact: [phone]

## 2021-07-09 NOTE — PROGRESS NOTES
Pt awake off and on during the night. Pt uses urinal at the bedside x 3 with 100 ml each time. Pt was c/o lower back pain and sits at side of bed. Writer washes pt back and gives back rub. Pt then asks to sit in recliner in which he does. Wound care completed as ordered. Complaints of pain with wound care.

## 2021-07-10 LAB
ANION GAP SERPL CALCULATED.3IONS-SCNC: 9 MMOL/L (ref 9–17)
BUN BLDV-MCNC: 26 MG/DL (ref 8–23)
BUN/CREAT BLD: 20 (ref 9–20)
C DIFF AG + TOXIN: NEGATIVE
CALCIUM SERPL-MCNC: 9.4 MG/DL (ref 8.6–10.4)
CHLORIDE BLD-SCNC: 107 MMOL/L (ref 98–107)
CO2: 24 MMOL/L (ref 20–31)
CREAT SERPL-MCNC: 1.3 MG/DL (ref 0.7–1.2)
CULTURE: NORMAL
CULTURE: NORMAL
DATE, STOOL #1: NORMAL
DATE, STOOL #2: NORMAL
DATE, STOOL #3: NORMAL
GFR AFRICAN AMERICAN: >60 ML/MIN
GFR NON-AFRICAN AMERICAN: 52 ML/MIN
GFR SERPL CREATININE-BSD FRML MDRD: ABNORMAL ML/MIN/{1.73_M2}
GFR SERPL CREATININE-BSD FRML MDRD: ABNORMAL ML/MIN/{1.73_M2}
GLUCOSE BLD-MCNC: 129 MG/DL (ref 70–99)
HEMOCCULT SP1 STL QL: NEGATIVE
HEMOCCULT SP2 STL QL: NORMAL
HEMOCCULT SP3 STL QL: NORMAL
HEMOCCULT STL QL: NORMAL
Lab: NORMAL
Lab: NORMAL
POTASSIUM SERPL-SCNC: 4 MMOL/L (ref 3.7–5.3)
SODIUM BLD-SCNC: 140 MMOL/L (ref 135–144)
SPECIMEN DESCRIPTION: NORMAL
TIME, STOOL #1: 1125
TIME, STOOL #2: NORMAL
TIME, STOOL #3: NORMAL

## 2021-07-10 PROCEDURE — 6370000000 HC RX 637 (ALT 250 FOR IP): Performed by: INTERNAL MEDICINE

## 2021-07-10 PROCEDURE — 87324 CLOSTRIDIUM AG IA: CPT

## 2021-07-10 PROCEDURE — 6360000002 HC RX W HCPCS: Performed by: INTERNAL MEDICINE

## 2021-07-10 PROCEDURE — 82272 OCCULT BLD FECES 1-3 TESTS: CPT

## 2021-07-10 PROCEDURE — 6370000000 HC RX 637 (ALT 250 FOR IP): Performed by: SURGERY

## 2021-07-10 PROCEDURE — 2580000003 HC RX 258: Performed by: INTERNAL MEDICINE

## 2021-07-10 PROCEDURE — 2500000003 HC RX 250 WO HCPCS: Performed by: INTERNAL MEDICINE

## 2021-07-10 PROCEDURE — 80048 BASIC METABOLIC PNL TOTAL CA: CPT

## 2021-07-10 PROCEDURE — 36415 COLL VENOUS BLD VENIPUNCTURE: CPT

## 2021-07-10 PROCEDURE — 87449 NOS EACH ORGANISM AG IA: CPT

## 2021-07-10 PROCEDURE — 1200000000 HC SEMI PRIVATE

## 2021-07-10 PROCEDURE — 2580000003 HC RX 258: Performed by: SURGERY

## 2021-07-10 PROCEDURE — 94640 AIRWAY INHALATION TREATMENT: CPT

## 2021-07-10 RX ORDER — TERBUTALINE SULFATE 2.5 MG/1
2.5 TABLET ORAL EVERY 6 HOURS SCHEDULED
Status: DISCONTINUED | OUTPATIENT
Start: 2021-07-10 | End: 2021-07-14 | Stop reason: HOSPADM

## 2021-07-10 RX ORDER — POLYETHYLENE GLYCOL 3350 17 G/17G
17 POWDER, FOR SOLUTION ORAL DAILY PRN
Status: DISCONTINUED | OUTPATIENT
Start: 2021-07-10 | End: 2021-07-14 | Stop reason: HOSPADM

## 2021-07-10 RX ORDER — FUROSEMIDE 20 MG/1
20 TABLET ORAL DAILY
Status: DISCONTINUED | OUTPATIENT
Start: 2021-07-10 | End: 2021-07-10

## 2021-07-10 RX ORDER — FUROSEMIDE 20 MG/1
20 TABLET ORAL DAILY
Status: COMPLETED | OUTPATIENT
Start: 2021-07-10 | End: 2021-07-10

## 2021-07-10 RX ADMIN — LEVALBUTEROL HYDROCHLORIDE 1.25 MG: 1.25 SOLUTION RESPIRATORY (INHALATION) at 02:23

## 2021-07-10 RX ADMIN — Medication 1 CAPSULE: at 17:36

## 2021-07-10 RX ADMIN — MIDODRINE HYDROCHLORIDE 5 MG: 5 TABLET ORAL at 12:10

## 2021-07-10 RX ADMIN — SODIUM CHLORIDE, PRESERVATIVE FREE 10 ML: 5 INJECTION INTRAVENOUS at 08:09

## 2021-07-10 RX ADMIN — IPRATROPIUM BROMIDE AND ALBUTEROL SULFATE 1 AMPULE: .5; 3 SOLUTION RESPIRATORY (INHALATION) at 11:25

## 2021-07-10 RX ADMIN — MIDODRINE HYDROCHLORIDE 5 MG: 5 TABLET ORAL at 08:09

## 2021-07-10 RX ADMIN — SODIUM CHLORIDE, PRESERVATIVE FREE 10 ML: 5 INJECTION INTRAVENOUS at 21:46

## 2021-07-10 RX ADMIN — POLYETHYLENE GLYCOL 3350 17 G: 17 POWDER, FOR SOLUTION ORAL at 08:08

## 2021-07-10 RX ADMIN — Medication 1200 UNITS: at 08:09

## 2021-07-10 RX ADMIN — TAMSULOSIN HYDROCHLORIDE 0.4 MG: 0.4 CAPSULE ORAL at 08:09

## 2021-07-10 RX ADMIN — IPRATROPIUM BROMIDE AND ALBUTEROL SULFATE 1 AMPULE: .5; 3 SOLUTION RESPIRATORY (INHALATION) at 16:11

## 2021-07-10 RX ADMIN — METHYLPREDNISOLONE SODIUM SUCCINATE 40 MG: 40 INJECTION, POWDER, FOR SOLUTION INTRAMUSCULAR; INTRAVENOUS at 05:45

## 2021-07-10 RX ADMIN — GABAPENTIN 300 MG: 300 CAPSULE ORAL at 21:45

## 2021-07-10 RX ADMIN — METHYLPREDNISOLONE SODIUM SUCCINATE 40 MG: 40 INJECTION, POWDER, FOR SOLUTION INTRAMUSCULAR; INTRAVENOUS at 15:59

## 2021-07-10 RX ADMIN — Medication 1 CAPSULE: at 08:09

## 2021-07-10 RX ADMIN — TERBUTALINE SULFATE 2.5 MG: 2.5 TABLET ORAL at 23:06

## 2021-07-10 RX ADMIN — OXYCODONE HYDROCHLORIDE AND ACETAMINOPHEN 500 MG: 500 TABLET ORAL at 08:09

## 2021-07-10 RX ADMIN — PANTOPRAZOLE SODIUM 40 MG: 40 TABLET, DELAYED RELEASE ORAL at 05:45

## 2021-07-10 RX ADMIN — ATORVASTATIN CALCIUM 40 MG: 40 TABLET, FILM COATED ORAL at 21:45

## 2021-07-10 RX ADMIN — TRAMADOL HYDROCHLORIDE 50 MG: 50 TABLET, FILM COATED ORAL at 22:51

## 2021-07-10 RX ADMIN — TERBUTALINE SULFATE 2.5 MG: 2.5 TABLET ORAL at 12:10

## 2021-07-10 RX ADMIN — IPRATROPIUM BROMIDE AND ALBUTEROL SULFATE 1 AMPULE: .5; 3 SOLUTION RESPIRATORY (INHALATION) at 05:37

## 2021-07-10 RX ADMIN — GABAPENTIN 300 MG: 300 CAPSULE ORAL at 08:09

## 2021-07-10 RX ADMIN — DOXYCYCLINE 100 MG: 100 INJECTION, POWDER, LYOPHILIZED, FOR SOLUTION INTRAVENOUS at 21:46

## 2021-07-10 RX ADMIN — CLOPIDOGREL BISULFATE 75 MG: 75 TABLET ORAL at 08:09

## 2021-07-10 RX ADMIN — MIDODRINE HYDROCHLORIDE 5 MG: 5 TABLET ORAL at 17:36

## 2021-07-10 RX ADMIN — TERBUTALINE SULFATE 2.5 MG: 2.5 TABLET ORAL at 17:36

## 2021-07-10 RX ADMIN — CHOLECALCIFEROL TAB 25 MCG (1000 UNIT) 1000 UNITS: 25 TAB at 08:09

## 2021-07-10 RX ADMIN — FUROSEMIDE 20 MG: 20 TABLET ORAL at 12:10

## 2021-07-10 RX ADMIN — CEFTRIAXONE SODIUM 1000 MG: 1 INJECTION, POWDER, FOR SOLUTION INTRAMUSCULAR; INTRAVENOUS at 08:08

## 2021-07-10 RX ADMIN — METHYLPREDNISOLONE SODIUM SUCCINATE 40 MG: 40 INJECTION, POWDER, FOR SOLUTION INTRAMUSCULAR; INTRAVENOUS at 21:46

## 2021-07-10 RX ADMIN — DOXYCYCLINE 100 MG: 100 INJECTION, POWDER, LYOPHILIZED, FOR SOLUTION INTRAVENOUS at 08:58

## 2021-07-10 ASSESSMENT — PAIN SCALES - GENERAL
PAINLEVEL_OUTOF10: 1
PAINLEVEL_OUTOF10: 1
PAINLEVEL_OUTOF10: 5
PAINLEVEL_OUTOF10: 1
PAINLEVEL_OUTOF10: 0
PAINLEVEL_OUTOF10: 1
PAINLEVEL_OUTOF10: 0
PAINLEVEL_OUTOF10: 0
PAINLEVEL_OUTOF10: 2
PAINLEVEL_OUTOF10: 0
PAINLEVEL_OUTOF10: 1
PAINLEVEL_OUTOF10: 2
PAINLEVEL_OUTOF10: 0
PAINLEVEL_OUTOF10: 3
PAINLEVEL_OUTOF10: 3

## 2021-07-10 ASSESSMENT — PAIN DESCRIPTION - DESCRIPTORS
DESCRIPTORS: ACHING
DESCRIPTORS: SHOOTING
DESCRIPTORS: ACHING
DESCRIPTORS: SHOOTING
DESCRIPTORS: ACHING
DESCRIPTORS: SHOOTING

## 2021-07-10 ASSESSMENT — PAIN DESCRIPTION - FREQUENCY
FREQUENCY: INTERMITTENT

## 2021-07-10 ASSESSMENT — PAIN DESCRIPTION - ORIENTATION
ORIENTATION: RIGHT
ORIENTATION: RIGHT;LEFT
ORIENTATION: RIGHT
ORIENTATION: RIGHT
ORIENTATION: RIGHT;LEFT
ORIENTATION: RIGHT
ORIENTATION: RIGHT

## 2021-07-10 ASSESSMENT — PAIN DESCRIPTION - LOCATION
LOCATION: LEG

## 2021-07-10 ASSESSMENT — PAIN DESCRIPTION - PAIN TYPE
TYPE: CHRONIC PAIN
TYPE: ACUTE PAIN
TYPE: CHRONIC PAIN
TYPE: ACUTE PAIN
TYPE: CHRONIC PAIN

## 2021-07-10 NOTE — CONSULTS
Hospitalist Consult Note    7/10/2021 7:37 AM    Subjective:   Admit Date: 7/4/2021  PCP: Mariajose Lynn MD    Physician requesting consult:  Dr. Nishant Min    Reason for Consult:  Medical management for COPD, Hypotension. Interval History: Cesar aLne states he is doing well this am.  He had 3 watery diarrhea yesterday and one this am.  Miralax changed to PRN. Will check C.diff. Cesar Lane is on a probiotic BID. No chest pain or SOB. He states his wheezing has improved on \"that new medication\". He states he had no light headedness yesterday after starting on Midodrine. Diet: ADULT DIET; Regular    Medications:   Scheduled Meds:   methylPREDNISolone  40 mg Intravenous Q8H    midodrine  5 mg Oral TID WC    pantoprazole  40 mg Oral QAM AC    sodium chloride flush  5-40 mL Intravenous BID    cefTRIAXone (ROCEPHIN) IV  1,000 mg Intravenous Q24H    doxycycline (VIBRAMYCIN) IV  100 mg Intravenous Q12H    lactobacillus  1 capsule Oral BID WC    ipratropium-albuterol  1 ampule Inhalation 4x daily    tamsulosin  0.4 mg Oral Daily    atorvastatin  40 mg Oral Nightly    clopidogrel  75 mg Oral Daily    gabapentin  300 mg Oral BID    [Held by provider] lisinopril  10 mg Oral Daily    vitamin C  500 mg Oral Daily    Vitamin D  1,000 Units Oral Daily    vitamin E  1,200 Units Oral Daily     Continuous Infusions:  CBC:   Recent Labs     07/09/21  0639   WBC 6.3   HGB 9.8*        BMP:    Recent Labs     07/07/21  1015 07/08/21  0507 07/09/21  0639    141 139   K 4.0 4.3 4.2   * 111* 109*   CO2 25 25 24   BUN 21 20 20   CREATININE 1.42* 1.49* 1.24*   GLUCOSE 174* 115* 99     Hepatic: No results for input(s): AST, ALT, ALB, BILITOT, ALKPHOS in the last 72 hours. Troponin: No results for input(s): TROPONINI in the last 72 hours. BNP: No results for input(s): BNP in the last 72 hours. Lipids: No results for input(s): CHOL, HDL in the last 72 hours.     Invalid input(s): LDLCALCU  INR: No treatment. Consider decreasing Solumedrol tomorrow if he continues to do well.       Patient Active Problem List:     Ischemia of extremity     S/P peripheral artery angioplasty     Moderate malnutrition (HCC)     Urinary retention     BPH with obstruction/lower urinary tract symptoms     Dysuria     Frequency of urination     Cellulitis of left leg     Left leg cellulitis     Nocturia     Cellulitis     Renal failure associated with renal vascular disease     Severe malnutrition (HCC)     Bilateral lower extremity edema     Abnormal ECG     Acute diastolic HF (heart failure), NYHA class 4 (HCC)     Cellulitis of left lower leg      Noah Mena MD, MD  [facility]

## 2021-07-10 NOTE — PLAN OF CARE
Problem: Falls - Risk of:  Goal: Will remain free from falls  Description: Will remain free from falls  Outcome: Ongoing  Goal: Absence of physical injury  Description: Absence of physical injury  Outcome: Ongoing     Problem: Pain:  Goal: Pain level will decrease  Description: Pain level will decrease  Outcome: Ongoing  Goal: Control of acute pain  Description: Control of acute pain  Outcome: Ongoing  Goal: Control of chronic pain  Description: Control of chronic pain  Outcome: Ongoing     Problem: Skin Integrity:  Goal: Will show no infection signs and symptoms  Description: Will show no infection signs and symptoms  Outcome: Ongoing  Goal: Absence of new skin breakdown  Description: Absence of new skin breakdown  Outcome: Ongoing     Problem: Discharge Planning:  Goal: Discharged to appropriate level of care  Description: Discharged to appropriate level of care  Outcome: Ongoing     Problem:  Activity Intolerance:  Goal: Ability to tolerate increased activity will improve  Description: Ability to tolerate increased activity will improve  Outcome: Ongoing     Problem: Airway Clearance - Ineffective:  Goal: Ability to maintain a clear airway will improve  Description: Ability to maintain a clear airway will improve  Outcome: Ongoing     Problem: Breathing Pattern - Ineffective:  Goal: Ability to achieve and maintain a regular respiratory rate will improve  Description: Ability to achieve and maintain a regular respiratory rate will improve  Outcome: Ongoing     Problem: Gas Exchange - Impaired:  Goal: Levels of oxygenation will improve  Description: Levels of oxygenation will improve  Outcome: Ongoing     Problem: Nutrition  Goal: Optimal nutrition therapy  7/9/2021 [id number] by Zoey Mathur RN  Outcome: Ongoing  7/9/2021 1304 by Angy Jiménez RD, LD  Outcome: Ongoing  Note: Nutrition Problem #1: Moderate malnutrition  Intervention: Food and/or Nutrient Delivery: Continue Current Diet  Nutritional Goals: PO > 75% meals with good protein sources

## 2021-07-10 NOTE — PROGRESS NOTES
Wound care completed per order. ACE wrap on LLE removed, corn huskers applied per pt request, allowed to dry and rewrapped.

## 2021-07-10 NOTE — PROGRESS NOTES
Pt up to chair during report. He is A&O x 4, denies pain other than the intermittent \"shooting pain\" he describes that he has in his right leg. Per report, he has had two BM this morning, and did not sleep well t/o the night. He states\" I just couldn't sleep much I guess\". He does not report it was due to pain. Electronically signed by John Carmona RN on 7/10/2021 at 7:30 AM

## 2021-07-10 NOTE — PROGRESS NOTES
Pt to BR for void and BM. BM soft/loose,  not watery. Small \"lumps\" observed in stool. Will continue to monitor to obtain specimen.  Electronically signed by Monique Webb RN on 7/10/2021 at 9:36 AM

## 2021-07-11 LAB
BNP INTERPRETATION: ABNORMAL
PRO-BNP: 3386 PG/ML

## 2021-07-11 PROCEDURE — 6370000000 HC RX 637 (ALT 250 FOR IP): Performed by: INTERNAL MEDICINE

## 2021-07-11 PROCEDURE — 2500000003 HC RX 250 WO HCPCS: Performed by: INTERNAL MEDICINE

## 2021-07-11 PROCEDURE — 94640 AIRWAY INHALATION TREATMENT: CPT

## 2021-07-11 PROCEDURE — 83880 ASSAY OF NATRIURETIC PEPTIDE: CPT

## 2021-07-11 PROCEDURE — 6370000000 HC RX 637 (ALT 250 FOR IP): Performed by: SURGERY

## 2021-07-11 PROCEDURE — 2580000003 HC RX 258: Performed by: SURGERY

## 2021-07-11 PROCEDURE — 51798 US URINE CAPACITY MEASURE: CPT

## 2021-07-11 PROCEDURE — 2580000003 HC RX 258: Performed by: INTERNAL MEDICINE

## 2021-07-11 PROCEDURE — 6360000002 HC RX W HCPCS: Performed by: INTERNAL MEDICINE

## 2021-07-11 PROCEDURE — 36415 COLL VENOUS BLD VENIPUNCTURE: CPT

## 2021-07-11 PROCEDURE — 1200000000 HC SEMI PRIVATE

## 2021-07-11 RX ORDER — PREDNISONE 20 MG/1
40 TABLET ORAL DAILY
Status: COMPLETED | OUTPATIENT
Start: 2021-07-11 | End: 2021-07-13

## 2021-07-11 RX ORDER — PREDNISONE 20 MG/1
20 TABLET ORAL DAILY
Status: DISCONTINUED | OUTPATIENT
Start: 2021-07-14 | End: 2021-07-14 | Stop reason: HOSPADM

## 2021-07-11 RX ADMIN — SODIUM CHLORIDE, PRESERVATIVE FREE 10 ML: 5 INJECTION INTRAVENOUS at 09:36

## 2021-07-11 RX ADMIN — CHOLECALCIFEROL TAB 25 MCG (1000 UNIT) 1000 UNITS: 25 TAB at 08:21

## 2021-07-11 RX ADMIN — CLOPIDOGREL BISULFATE 75 MG: 75 TABLET ORAL at 08:21

## 2021-07-11 RX ADMIN — TRAMADOL HYDROCHLORIDE 50 MG: 50 TABLET, FILM COATED ORAL at 20:56

## 2021-07-11 RX ADMIN — Medication 1 CAPSULE: at 17:33

## 2021-07-11 RX ADMIN — SODIUM CHLORIDE, PRESERVATIVE FREE 10 ML: 5 INJECTION INTRAVENOUS at 20:56

## 2021-07-11 RX ADMIN — IPRATROPIUM BROMIDE AND ALBUTEROL SULFATE 1 AMPULE: .5; 3 SOLUTION RESPIRATORY (INHALATION) at 10:46

## 2021-07-11 RX ADMIN — HYDROXYZINE PAMOATE 50 MG: 25 CAPSULE ORAL at 20:56

## 2021-07-11 RX ADMIN — Medication 1200 UNITS: at 08:21

## 2021-07-11 RX ADMIN — IPRATROPIUM BROMIDE AND ALBUTEROL SULFATE 1 AMPULE: .5; 3 SOLUTION RESPIRATORY (INHALATION) at 16:40

## 2021-07-11 RX ADMIN — MIDODRINE HYDROCHLORIDE 5 MG: 5 TABLET ORAL at 17:33

## 2021-07-11 RX ADMIN — TERBUTALINE SULFATE 2.5 MG: 2.5 TABLET ORAL at 23:19

## 2021-07-11 RX ADMIN — DOXYCYCLINE 100 MG: 100 INJECTION, POWDER, LYOPHILIZED, FOR SOLUTION INTRAVENOUS at 09:37

## 2021-07-11 RX ADMIN — MIDODRINE HYDROCHLORIDE 5 MG: 5 TABLET ORAL at 07:23

## 2021-07-11 RX ADMIN — SODIUM CHLORIDE, PRESERVATIVE FREE 10 ML: 5 INJECTION INTRAVENOUS at 08:21

## 2021-07-11 RX ADMIN — OXYCODONE HYDROCHLORIDE AND ACETAMINOPHEN 500 MG: 500 TABLET ORAL at 08:22

## 2021-07-11 RX ADMIN — MIDODRINE HYDROCHLORIDE 5 MG: 5 TABLET ORAL at 11:56

## 2021-07-11 RX ADMIN — TAMSULOSIN HYDROCHLORIDE 0.4 MG: 0.4 CAPSULE ORAL at 08:22

## 2021-07-11 RX ADMIN — IPRATROPIUM BROMIDE AND ALBUTEROL SULFATE 1 AMPULE: .5; 3 SOLUTION RESPIRATORY (INHALATION) at 20:36

## 2021-07-11 RX ADMIN — Medication 1 CAPSULE: at 07:23

## 2021-07-11 RX ADMIN — TERBUTALINE SULFATE 2.5 MG: 2.5 TABLET ORAL at 17:33

## 2021-07-11 RX ADMIN — GABAPENTIN 300 MG: 300 CAPSULE ORAL at 08:22

## 2021-07-11 RX ADMIN — PANTOPRAZOLE SODIUM 40 MG: 40 TABLET, DELAYED RELEASE ORAL at 05:53

## 2021-07-11 RX ADMIN — METHYLPREDNISOLONE SODIUM SUCCINATE 40 MG: 40 INJECTION, POWDER, FOR SOLUTION INTRAMUSCULAR; INTRAVENOUS at 05:53

## 2021-07-11 RX ADMIN — TERBUTALINE SULFATE 2.5 MG: 2.5 TABLET ORAL at 05:53

## 2021-07-11 RX ADMIN — GABAPENTIN 300 MG: 300 CAPSULE ORAL at 20:56

## 2021-07-11 RX ADMIN — DOXYCYCLINE 100 MG: 100 INJECTION, POWDER, LYOPHILIZED, FOR SOLUTION INTRAVENOUS at 21:15

## 2021-07-11 RX ADMIN — TERBUTALINE SULFATE 2.5 MG: 2.5 TABLET ORAL at 11:56

## 2021-07-11 RX ADMIN — PREDNISONE 40 MG: 20 TABLET ORAL at 08:58

## 2021-07-11 RX ADMIN — SODIUM CHLORIDE, PRESERVATIVE FREE 10 ML: 5 INJECTION INTRAVENOUS at 08:58

## 2021-07-11 RX ADMIN — IPRATROPIUM BROMIDE AND ALBUTEROL SULFATE 1 AMPULE: .5; 3 SOLUTION RESPIRATORY (INHALATION) at 06:08

## 2021-07-11 RX ADMIN — CEFTRIAXONE SODIUM 1000 MG: 1 INJECTION, POWDER, FOR SOLUTION INTRAMUSCULAR; INTRAVENOUS at 08:21

## 2021-07-11 RX ADMIN — ATORVASTATIN CALCIUM 40 MG: 40 TABLET, FILM COATED ORAL at 20:56

## 2021-07-11 ASSESSMENT — PAIN DESCRIPTION - LOCATION
LOCATION: LEG

## 2021-07-11 ASSESSMENT — PAIN DESCRIPTION - PAIN TYPE
TYPE: CHRONIC PAIN

## 2021-07-11 ASSESSMENT — PAIN DESCRIPTION - ORIENTATION
ORIENTATION: RIGHT

## 2021-07-11 ASSESSMENT — PAIN SCALES - GENERAL
PAINLEVEL_OUTOF10: 0
PAINLEVEL_OUTOF10: 5
PAINLEVEL_OUTOF10: 0
PAINLEVEL_OUTOF10: 5
PAINLEVEL_OUTOF10: 0
PAINLEVEL_OUTOF10: 0
PAINLEVEL_OUTOF10: 2
PAINLEVEL_OUTOF10: 0
PAINLEVEL_OUTOF10: 2
PAINLEVEL_OUTOF10: 5
PAINLEVEL_OUTOF10: 1
PAINLEVEL_OUTOF10: 1
PAINLEVEL_OUTOF10: 0

## 2021-07-11 ASSESSMENT — PAIN DESCRIPTION - FREQUENCY: FREQUENCY: INTERMITTENT

## 2021-07-11 NOTE — PROGRESS NOTES
Dot #7              Subjective: Patient feeding self lunch. Denies chest pain. Reports his shortness of breath is about the same as at home. Denies pains with deep palpations inguinal regions right and left. Says he is beginning to experience itching in lower legs especially right one with reduced pain in ulcerated areas. Objective: Afebrile vs stable on current medicinal regimen. Chest slow expiration clear lung sounds bilaterally with terminal expiratory wheezing bilaterally. Abdomen soft normal bowel sounds with no tenderness. stooling continues but nursing reports stool is forming up. Carotids clear no bruits . Weight 228 retaken several times. Bmp up. I do dressing changes and photographs are taken of wounds. Wound are showing dermal buds which are beginning to spread. Cellulitis clearing rapidly. Minimal areas left. Assessment: acute on chronic diastolic CHF, acute on chronic renal  Disease ,anemia,  MRSA,serratia, proteus cellulitis chronic venous stasis disease of lower extremities,copd          Plan: Will scan bladder for residuals after voiding times three. Continue conversion to po steroides. Continue bricanyl. Will consider consolidation to oral antibiotic in 48 hours give continued progress in wounds. All of the patient's questions answered. Possible pending problems:  Decreasing urine prodution with increasing fluid retention.         Miguel A Lynn MD

## 2021-07-11 NOTE — PROGRESS NOTES
Up to bathroom. Assisted to bathe. Brushes gums and tongue. Gown changed. Romayne Nao is able to complete ADL's with minimal assistance. Ambulates to chair. Linens changed. Denies further needs at this time.

## 2021-07-11 NOTE — PLAN OF CARE
Problem: Falls - Risk of:  Goal: Will remain free from falls  Description: Will remain free from falls  Outcome: Ongoing  Goal: Absence of physical injury  Description: Absence of physical injury  Outcome: Ongoing     Problem: Pain:  Goal: Pain level will decrease  Description: Pain level will decrease  Outcome: Ongoing  Goal: Control of acute pain  Description: Control of acute pain  Outcome: Ongoing  Goal: Control of chronic pain  Description: Control of chronic pain  Outcome: Ongoing     Problem: Skin Integrity:  Goal: Will show no infection signs and symptoms  Description: Will show no infection signs and symptoms  Outcome: Ongoing  Goal: Absence of new skin breakdown  Description: Absence of new skin breakdown  Outcome: Ongoing     Problem: Discharge Planning:  Goal: Discharged to appropriate level of care  Description: Discharged to appropriate level of care  Outcome: Ongoing     Problem:  Activity Intolerance:  Goal: Ability to tolerate increased activity will improve  Description: Ability to tolerate increased activity will improve  Outcome: Ongoing     Problem: Airway Clearance - Ineffective:  Goal: Ability to maintain a clear airway will improve  Description: Ability to maintain a clear airway will improve  Outcome: Ongoing     Problem: Breathing Pattern - Ineffective:  Goal: Ability to achieve and maintain a regular respiratory rate will improve  Description: Ability to achieve and maintain a regular respiratory rate will improve  Outcome: Ongoing     Problem: Gas Exchange - Impaired:  Goal: Levels of oxygenation will improve  Description: Levels of oxygenation will improve  Outcome: Ongoing     Problem: Nutrition  Goal: Optimal nutrition therapy  Outcome: Ongoing

## 2021-07-11 NOTE — PROGRESS NOTES
Dr Caitie Hernandez completes dressing change at the bedside. Alden tolerates with out complaint. New dressing applied. Pictures taken of wounds for documentation. Bladder scan complete. Voids 10 ml. Bladder scan reveals 12 ml remain in the bladder. Denies further needs.

## 2021-07-11 NOTE — PROGRESS NOTES
Up t BR, gown and chux under patient saturated with urine plus additional 250 ml of yellow urine in urinal.  Assisted to BR with SBA x1. Gait slow but steady. Has additional urine and loose brown BM in toilet. Returns to sit on side of bed.

## 2021-07-11 NOTE — CONSULTS
Hospitalist Consult Note    7/11/2021 8:09 AM    Subjective:   Admit Date: 7/4/2021  PCP: Aubrie Forbes MD    Physician requesting consult:  Dr. Griselda Padgett    Reason for Consult:  Medical management for COPD / Hypotension. Interval History: Lizbeth Parkinson has no complaints this morning. He states he still has a wheeze with moving around but \"I always have that\". No chest pain or increase in SOB. Appetite is good, no nausea. Bowels have slowed down. Stool C.diff negative. Diet: ADULT DIET; Regular    Medications:   Scheduled Meds:   terbutaline  2.5 mg Oral 4 times per day    methylPREDNISolone  40 mg Intravenous Q8H    midodrine  5 mg Oral TID WC    pantoprazole  40 mg Oral QAM AC    sodium chloride flush  5-40 mL Intravenous BID    cefTRIAXone (ROCEPHIN) IV  1,000 mg Intravenous Q24H    doxycycline (VIBRAMYCIN) IV  100 mg Intravenous Q12H    lactobacillus  1 capsule Oral BID WC    ipratropium-albuterol  1 ampule Inhalation 4x daily    tamsulosin  0.4 mg Oral Daily    atorvastatin  40 mg Oral Nightly    clopidogrel  75 mg Oral Daily    gabapentin  300 mg Oral BID    [Held by provider] lisinopril  10 mg Oral Daily    vitamin C  500 mg Oral Daily    Vitamin D  1,000 Units Oral Daily    vitamin E  1,200 Units Oral Daily     Continuous Infusions:  CBC:   Recent Labs     07/09/21  0639   WBC 6.3   HGB 9.8*        BMP:    Recent Labs     07/09/21  0639 07/10/21  1207    140   K 4.2 4.0   * 107   CO2 24 24   BUN 20 26*   CREATININE 1.24* 1.30*   GLUCOSE 99 129*     Hepatic: No results for input(s): AST, ALT, ALB, BILITOT, ALKPHOS in the last 72 hours. Troponin: No results for input(s): TROPONINI in the last 72 hours. BNP: No results for input(s): BNP in the last 72 hours. Lipids: No results for input(s): CHOL, HDL in the last 72 hours. Invalid input(s): LDLCALCU  INR: No results for input(s): INR in the last 72 hours.     Objective:   Vitals: BP (!) 97/53   Pulse 62   Temp 97.7 °F (36.5 °C) (Oral)   Resp 18   Ht 5' 4\" (1.626 m)   Wt 228 lb (103.4 kg) Comment: bed scale weight shows 222.3#   SpO2 94%   BMI 39.14 kg/m²   General appearance: alert and cooperative with exam  HEENT: Head: Normocephalic, no lesions, without obvious abnormality. Eye: Normal external eye, conjunctiva, lids cornea, MELECIO. Nose: Normal external nose, mucus membranes and septum. Neck: no adenopathy, no carotid bruit and supple, symmetrical, trachea midline  Lungs: Clear anteriorly with a few rales in the bases posteriorly. Heart: regular rate and rhythm, S1, S2 normal and II/VI systolic murmur. Abdomen: +BS, soft, non distended. Mansoor Villarreal  has some pain with palpation over the right lower abdomen, \"I pulled a muscle with coughing\". Extremities: ACE wraps on both legs, no calf tenderness. Neurologic: Mental status: Alert, oriented, thought content appropriate    Assessment and Plan:   1.  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  Right lower leg cellulitis - MRSA / Serratia / Proteus growing on culture.  Antibiotic changed from Zosyn to Rocephin / Doxycyline. 3.  HTN - SBP had been < 100 since admission.  Lisinopril on hold.  Added on Midodrine on 7/10/21 and patient reports episodes of light headedness when up has improved. 4.  Hyperlipidemia - on Lipitor. 5.  PVD - on Plavix. 6.  BPH - on Flomax. 7.  Vitamin D deficiency - on replacement. 8.  COPD - Wheezing / SOB has improved with addition of Solumedrol on 7/10/21.  PRN Xopenex treatments ordered.    9.  Constipation - has now turned to diarrhea with addition of Miralax (changed to PRN). 10.  Hypotension - Added on Midodrine 5 mg TID. 6.  Cough noted by nursing while eating - Speech evaluated and felt there was no signs of aspiration. Encourage patient to take small bites. 12.  Diarrhea - Miralax changed to PRN. Mansoor Villarreal states the diarrhea has slowed. C.diff negative. On Probiotic. Plan:  1. Change Solumedrol to prednisone taper. 2.  Consider a consult to Cardiology with increase weight / BNP. At this time I have nothing to add. Will sign off. Please don't hesitate to call if any assistance is needed.       Patient Active Problem List:     Ischemia of extremity     S/P peripheral artery angioplasty     Moderate malnutrition (HCC)     Urinary retention     BPH with obstruction/lower urinary tract symptoms     Dysuria     Frequency of urination     Cellulitis of left leg     Left leg cellulitis     Nocturia     Cellulitis     Renal failure associated with renal vascular disease     Severe malnutrition (HCC)     Bilateral lower extremity edema     Abnormal ECG     Acute diastolic HF (heart failure), NYHA class 4 (HCC)     Cellulitis of left lower leg      Quan Mancuso MD, MD  [facility] Hospitalist

## 2021-07-12 ENCOUNTER — APPOINTMENT (OUTPATIENT)
Dept: CT IMAGING | Age: 86
DRG: 602 | End: 2021-07-12
Payer: MEDICARE

## 2021-07-12 LAB
ANION GAP SERPL CALCULATED.3IONS-SCNC: 11 MMOL/L (ref 9–17)
BNP INTERPRETATION: ABNORMAL
BUN BLDV-MCNC: 35 MG/DL (ref 8–23)
BUN/CREAT BLD: 26 (ref 9–20)
CALCIUM SERPL-MCNC: 9.1 MG/DL (ref 8.6–10.4)
CHLORIDE BLD-SCNC: 111 MMOL/L (ref 98–107)
CO2: 22 MMOL/L (ref 20–31)
CREAT SERPL-MCNC: 1.37 MG/DL (ref 0.7–1.2)
GFR AFRICAN AMERICAN: 59 ML/MIN
GFR NON-AFRICAN AMERICAN: 49 ML/MIN
GFR SERPL CREATININE-BSD FRML MDRD: ABNORMAL ML/MIN/{1.73_M2}
GFR SERPL CREATININE-BSD FRML MDRD: ABNORMAL ML/MIN/{1.73_M2}
GLUCOSE BLD-MCNC: 104 MG/DL (ref 70–99)
LV EF: 60 %
LVEF MODALITY: NORMAL
POTASSIUM SERPL-SCNC: 3.8 MMOL/L (ref 3.7–5.3)
PRO-BNP: 3698 PG/ML
SODIUM BLD-SCNC: 144 MMOL/L (ref 135–144)

## 2021-07-12 PROCEDURE — 6370000000 HC RX 637 (ALT 250 FOR IP): Performed by: SURGERY

## 2021-07-12 PROCEDURE — 6370000000 HC RX 637 (ALT 250 FOR IP): Performed by: INTERNAL MEDICINE

## 2021-07-12 PROCEDURE — 1200000000 HC SEMI PRIVATE

## 2021-07-12 PROCEDURE — 2500000003 HC RX 250 WO HCPCS: Performed by: INTERNAL MEDICINE

## 2021-07-12 PROCEDURE — 71250 CT THORAX DX C-: CPT

## 2021-07-12 PROCEDURE — 80048 BASIC METABOLIC PNL TOTAL CA: CPT

## 2021-07-12 PROCEDURE — 94640 AIRWAY INHALATION TREATMENT: CPT

## 2021-07-12 PROCEDURE — 36415 COLL VENOUS BLD VENIPUNCTURE: CPT

## 2021-07-12 PROCEDURE — 6360000002 HC RX W HCPCS: Performed by: INTERNAL MEDICINE

## 2021-07-12 PROCEDURE — 93306 TTE W/DOPPLER COMPLETE: CPT

## 2021-07-12 PROCEDURE — 83880 ASSAY OF NATRIURETIC PEPTIDE: CPT

## 2021-07-12 PROCEDURE — 2580000003 HC RX 258: Performed by: INTERNAL MEDICINE

## 2021-07-12 PROCEDURE — 2580000003 HC RX 258: Performed by: SURGERY

## 2021-07-12 PROCEDURE — 99222 1ST HOSP IP/OBS MODERATE 55: CPT | Performed by: INTERNAL MEDICINE

## 2021-07-12 RX ORDER — FUROSEMIDE 10 MG/ML
20 INJECTION INTRAMUSCULAR; INTRAVENOUS 2 TIMES DAILY
Status: DISCONTINUED | OUTPATIENT
Start: 2021-07-12 | End: 2021-07-14 | Stop reason: HOSPADM

## 2021-07-12 RX ORDER — DOXYCYCLINE HYCLATE 100 MG
100 TABLET ORAL EVERY 12 HOURS SCHEDULED
Status: DISCONTINUED | OUTPATIENT
Start: 2021-07-12 | End: 2021-07-14 | Stop reason: HOSPADM

## 2021-07-12 RX ORDER — SPIRONOLACTONE 25 MG/1
25 TABLET ORAL DAILY
Status: DISCONTINUED | OUTPATIENT
Start: 2021-07-13 | End: 2021-07-14 | Stop reason: HOSPADM

## 2021-07-12 RX ORDER — SPIRONOLACTONE 25 MG/1
12.5 TABLET ORAL DAILY
Status: DISCONTINUED | OUTPATIENT
Start: 2021-07-12 | End: 2021-07-12

## 2021-07-12 RX ADMIN — ATORVASTATIN CALCIUM 40 MG: 40 TABLET, FILM COATED ORAL at 21:11

## 2021-07-12 RX ADMIN — CHOLECALCIFEROL TAB 25 MCG (1000 UNIT) 1000 UNITS: 25 TAB at 08:52

## 2021-07-12 RX ADMIN — IPRATROPIUM BROMIDE AND ALBUTEROL SULFATE 1 AMPULE: .5; 3 SOLUTION RESPIRATORY (INHALATION) at 11:30

## 2021-07-12 RX ADMIN — TAMSULOSIN HYDROCHLORIDE 0.4 MG: 0.4 CAPSULE ORAL at 08:52

## 2021-07-12 RX ADMIN — MIDODRINE HYDROCHLORIDE 5 MG: 5 TABLET ORAL at 09:00

## 2021-07-12 RX ADMIN — DOXYCYCLINE HYCLATE 100 MG: 100 TABLET, COATED ORAL at 21:11

## 2021-07-12 RX ADMIN — MIDODRINE HYDROCHLORIDE 5 MG: 5 TABLET ORAL at 17:25

## 2021-07-12 RX ADMIN — IPRATROPIUM BROMIDE AND ALBUTEROL SULFATE 1 AMPULE: .5; 3 SOLUTION RESPIRATORY (INHALATION) at 05:49

## 2021-07-12 RX ADMIN — MIDODRINE HYDROCHLORIDE 5 MG: 5 TABLET ORAL at 11:51

## 2021-07-12 RX ADMIN — SPIRONOLACTONE 12.5 MG: 25 TABLET, FILM COATED ORAL at 14:48

## 2021-07-12 RX ADMIN — OXYCODONE HYDROCHLORIDE AND ACETAMINOPHEN 500 MG: 500 TABLET ORAL at 08:52

## 2021-07-12 RX ADMIN — PREDNISONE 40 MG: 20 TABLET ORAL at 08:52

## 2021-07-12 RX ADMIN — PANTOPRAZOLE SODIUM 40 MG: 40 TABLET, DELAYED RELEASE ORAL at 06:00

## 2021-07-12 RX ADMIN — TERBUTALINE SULFATE 2.5 MG: 2.5 TABLET ORAL at 17:25

## 2021-07-12 RX ADMIN — SODIUM CHLORIDE, PRESERVATIVE FREE 10 ML: 5 INJECTION INTRAVENOUS at 09:56

## 2021-07-12 RX ADMIN — TERBUTALINE SULFATE 2.5 MG: 2.5 TABLET ORAL at 11:51

## 2021-07-12 RX ADMIN — GABAPENTIN 300 MG: 300 CAPSULE ORAL at 21:11

## 2021-07-12 RX ADMIN — DOXYCYCLINE 100 MG: 100 INJECTION, POWDER, LYOPHILIZED, FOR SOLUTION INTRAVENOUS at 09:56

## 2021-07-12 RX ADMIN — CLOPIDOGREL BISULFATE 75 MG: 75 TABLET ORAL at 08:52

## 2021-07-12 RX ADMIN — SODIUM CHLORIDE, PRESERVATIVE FREE 10 ML: 5 INJECTION INTRAVENOUS at 08:53

## 2021-07-12 RX ADMIN — MELATONIN 4.5 MG: at 23:49

## 2021-07-12 RX ADMIN — CEFTRIAXONE SODIUM 1000 MG: 1 INJECTION, POWDER, FOR SOLUTION INTRAMUSCULAR; INTRAVENOUS at 09:29

## 2021-07-12 RX ADMIN — GABAPENTIN 300 MG: 300 CAPSULE ORAL at 08:53

## 2021-07-12 RX ADMIN — Medication 1200 UNITS: at 09:05

## 2021-07-12 RX ADMIN — TERBUTALINE SULFATE 2.5 MG: 2.5 TABLET ORAL at 23:49

## 2021-07-12 RX ADMIN — FUROSEMIDE 20 MG: 10 INJECTION, SOLUTION INTRAMUSCULAR; INTRAVENOUS at 19:22

## 2021-07-12 RX ADMIN — Medication 1 CAPSULE: at 17:25

## 2021-07-12 RX ADMIN — SODIUM CHLORIDE, PRESERVATIVE FREE 10 ML: 5 INJECTION INTRAVENOUS at 19:22

## 2021-07-12 RX ADMIN — TERBUTALINE SULFATE 2.5 MG: 2.5 TABLET ORAL at 06:00

## 2021-07-12 RX ADMIN — IPRATROPIUM BROMIDE AND ALBUTEROL SULFATE 1 AMPULE: .5; 3 SOLUTION RESPIRATORY (INHALATION) at 21:13

## 2021-07-12 RX ADMIN — Medication 1 CAPSULE: at 08:52

## 2021-07-12 RX ADMIN — IPRATROPIUM BROMIDE AND ALBUTEROL SULFATE 1 AMPULE: .5; 3 SOLUTION RESPIRATORY (INHALATION) at 16:40

## 2021-07-12 ASSESSMENT — PAIN SCALES - GENERAL
PAINLEVEL_OUTOF10: 0
PAINLEVEL_OUTOF10: 1

## 2021-07-12 ASSESSMENT — PAIN DESCRIPTION - PAIN TYPE: TYPE: CHRONIC PAIN

## 2021-07-12 ASSESSMENT — PAIN DESCRIPTION - LOCATION: LOCATION: LEG

## 2021-07-12 ASSESSMENT — PAIN DESCRIPTION - ORIENTATION: ORIENTATION: RIGHT

## 2021-07-12 ASSESSMENT — PAIN DESCRIPTION - DESCRIPTORS: DESCRIPTORS: SHOOTING

## 2021-07-12 NOTE — PROGRESS NOTES
Dr. Alex Alcantara called and updated with labs and Dr. Kendy Cid new orders. Tasia Evans for patient to have aldactone.

## 2021-07-12 NOTE — PROGRESS NOTES
Dot #8          Subjective: Patient denies chest pain. Reports he is no more short of breath than usual.  His bowels are firming up. He claims his appetite is good. He reports that his  Leg is less and less tender with dressing changes. Objective: Chest slow expiration no wheezing at this point, but, some fine rales at the bases , and nursing reports occasional periods of wheezing. Afebrile vs stable. Oxygen saturation is good on room air. Alert oriented. Watching  [address] today. Card rrr. Carotids no bruits. Weight is up again. I do dressing change. Legs are in pretty good condition with ulcerated areas closing and cellulitis clearing. Ace wraps are important to keep oedema out of legs and to assist with orthostatsis. Assessment: acute on chronic diastolic CHF, acute on chronic renal  Disease ,anemia,  MRSA,serratia, proteus cellulitis chronic venous stasis disease of lower extremities,copd           Plan: Consultation cardiology. Will consider compromise of accepting Pre- Renal renal pattern for improvement in diastolic failure. Repeat bmp and BMP to fix starting point.      Marlene Bedoya MD

## 2021-07-12 NOTE — PLAN OF CARE
Problem: Falls - Risk of:  Goal: Will remain free from falls  Description: Will remain free from falls  7/12/2021 0102 by Ana Laura Harden RN  Outcome: Ongoing  7/11/2021 1530 by Charlee Braun RN  Outcome: Met This Shift  Goal: Absence of physical injury  Description: Absence of physical injury  7/12/2021 0102 by Ana Laura Harden RN  Outcome: Ongoing  7/11/2021 1530 by Charlee Braun RN  Outcome: Met This Shift     Problem: Pain:  Goal: Pain level will decrease  Description: Pain level will decrease  7/12/2021 0102 by Ana Laura Harden RN  Outcome: Ongoing  7/11/2021 1530 by Charlee Braun RN  Outcome: Met This Shift  Goal: Control of acute pain  Description: Control of acute pain  7/12/2021 0102 by Ana Laura Harden RN  Outcome: Ongoing  7/11/2021 1530 by Charlee Braun RN  Outcome: Met This Shift  Goal: Control of chronic pain  Description: Control of chronic pain  7/12/2021 0102 by Ana Laura Harden RN  Outcome: Ongoing  7/11/2021 1530 by Charlee Braun RN  Outcome: Met This Shift     Problem: Skin Integrity:  Goal: Will show no infection signs and symptoms  Description: Will show no infection signs and symptoms  Outcome: Ongoing  Goal: Absence of new skin breakdown  Description: Absence of new skin breakdown  Outcome: Ongoing     Problem: Discharge Planning:  Goal: Discharged to appropriate level of care  Description: Discharged to appropriate level of care  Outcome: Ongoing     Problem:  Activity Intolerance:  Goal: Ability to tolerate increased activity will improve  Description: Ability to tolerate increased activity will improve  Outcome: Ongoing     Problem: Airway Clearance - Ineffective:  Goal: Ability to maintain a clear airway will improve  Description: Ability to maintain a clear airway will improve  Outcome: Ongoing     Problem: Breathing Pattern - Ineffective:  Goal: Ability to achieve and maintain a regular respiratory rate will improve  Description: Ability to achieve and maintain a regular respiratory rate will improve  Outcome: Ongoing     Problem: Gas Exchange - Impaired:  Goal: Levels of oxygenation will improve  Description: Levels of oxygenation will improve  Outcome: Ongoing     Problem: Nutrition  Goal: Optimal nutrition therapy  Outcome: Ongoing     Problem: Infection - Methicillin-Resistant Staphylococcus Aureus Infection:  Goal: Absence of methicillin-resistant Staphylococcus aureus infection  Description: Absence of methicillin-resistant Staphylococcus aureus infection  Outcome: Ongoing

## 2021-07-12 NOTE — PROGRESS NOTES
Pt up to BR, 2nd of 3 bladder scans completed, residual is 18 at this time. 2000- Pt in bed with eyes closed, RR 14 and even, no s/s of distress. Call light resting on lap. Will continue to monitor.

## 2021-07-12 NOTE — PROGRESS NOTES
Comprehensive Nutrition Assessment    Type and Reason for Visit:  Reassess    Nutrition Recommendations/Plan:  Continue to avoid salt in diet    Nutrition Assessment:  Continued chronic moderate malnutritionr/t inadequate nutrient intakes, AEB chronic fat and muscle losses. Continued wound healing needs bilateral LE. Increased weight and edema (now generalized). Denies use of salt (addded) on \"regular\" diet, and states avoidance at home as well. No decreases in appetite despite 20# weight gains. Is taking protein foods well. Malnutrition Assessment:  Malnutrition Status: Moderate malnutrition    Context:  Chronic Illness     Findings of the 6 clinical characteristics of malnutrition:  Energy Intake:  No significant decrease in energy intake  Weight Loss:  No significant weight loss     Body Fat Loss:  1 - Mild body fat loss (moderate) Buccal region, Orbital   Muscle Mass Loss:  1 - Mild muscle mass loss (moderate) Temples (temporalis)  Fluid Accumulation:  1 - Mild (moderate) Generalized   Strength:  Not Performed    Estimated Daily Nutrient Needs:  Energy (kcal):  8183-1359 (20-23); Weight Used for Energy Requirements:  Current     Protein (g):   (1.5-1.8); Weight Used for Protein Requirements:  Ideal        Fluid (ml/day):  2200; Method Used for Fluid Requirements:  1 ml/kcal      Nutrition Related Findings:  Upper and lower extremity edema/generalized edema      Wounds:   (cellulitis with bilateral pretibial wounds)       Current Nutrition Therapies:    ADULT DIET;  Regular    Anthropometric Measures:  · Height: 5' 4\" (162.6 cm)  · Current Body Weight: 231 lb 6.4 oz (105 kg)   · Admission Body Weight: 209 lb 6.4 oz (95 kg)    · Usual Body Weight: 210 lb 12.8 oz (95.6 kg)     · Ideal Body Weight: 130 lbs; % Ideal Body Weight 169.3 %   · BMI: 39.7  · Adjusted Body Weight:  ; No Adjustment   · BMI Categories: Obese Class 2 (BMI 35.0 -39.9)       Nutrition Diagnosis:   · Moderate malnutrition related to inadequate protein-energy intake as evidenced by moderate loss of subcutaneous fat, moderate muscle loss    Lab Results   Component Value Date     07/10/2021    K 4.0 07/10/2021     07/10/2021    CO2 24 07/10/2021    BUN 26 (H) 07/10/2021    CREATININE 1.30 (H) 07/10/2021    GLUCOSE 129 (H) 07/10/2021    CALCIUM 9.4 07/10/2021    PROT 6.7 06/14/2021    LABALBU 3.7 06/14/2021    BILITOT 0.35 06/14/2021    ALKPHOS 67 06/14/2021    AST 19 06/14/2021    ALT 12 06/14/2021    LABGLOM 52 (L) 07/10/2021    GFRAA >60 07/10/2021    GLOB NOT REPORTED 10/29/2020     Nutrition Interventions:   Food and/or Nutrient Delivery:  Modify Current Diet (recommend reduced sodium in diet)  Nutrition Education/Counseling:  No recommendation at this time   Coordination of Nutrition Care:  Continue to monitor while inpatient    Goals:  PO > 75% meals with good protein sources       Nutrition Monitoring and Evaluation:   Behavioral-Environmental Outcomes:  Knowledge or Skill   Food/Nutrient Intake Outcomes:  Food and Nutrient Intake  Physical Signs/Symptoms Outcomes:  Biochemical Data, Weight, Fluid Status or Edema     Discharge Planning:    No discharge needs at this time     Electronically signed by Nati Doherty RD, LD on 7/12/21 at 8:22 AM EDT    Contact: [phone]

## 2021-07-13 LAB
ABSOLUTE EOS #: 0 K/UL (ref 0–0.4)
ABSOLUTE IMMATURE GRANULOCYTE: ABNORMAL K/UL (ref 0–0.3)
ABSOLUTE LYMPH #: 1.1 K/UL (ref 1–4.8)
ABSOLUTE MONO #: 1.3 K/UL (ref 0–1)
ALBUMIN SERPL-MCNC: 3 G/DL (ref 3.5–5.2)
ALBUMIN/GLOBULIN RATIO: ABNORMAL (ref 1–2.5)
ALP BLD-CCNC: 60 U/L (ref 40–129)
ALT SERPL-CCNC: 29 U/L (ref 5–41)
ANION GAP SERPL CALCULATED.3IONS-SCNC: 7 MMOL/L (ref 9–17)
AST SERPL-CCNC: 27 U/L
BASOPHILS # BLD: 0 % (ref 0–2)
BASOPHILS ABSOLUTE: 0 K/UL (ref 0–0.2)
BILIRUB SERPL-MCNC: 0.12 MG/DL (ref 0.3–1.2)
BILIRUBIN URINE: NEGATIVE
BUN BLDV-MCNC: 36 MG/DL (ref 8–23)
BUN/CREAT BLD: 25 (ref 9–20)
CALCIUM SERPL-MCNC: 9.1 MG/DL (ref 8.6–10.4)
CHLORIDE BLD-SCNC: 113 MMOL/L (ref 98–107)
CO2: 23 MMOL/L (ref 20–31)
COLOR: YELLOW
COMMENT UA: NORMAL
CREAT SERPL-MCNC: 1.44 MG/DL (ref 0.7–1.2)
DIFFERENTIAL TYPE: YES
EOSINOPHILS RELATIVE PERCENT: 0 % (ref 0–5)
GFR AFRICAN AMERICAN: 56 ML/MIN
GFR NON-AFRICAN AMERICAN: 46 ML/MIN
GFR SERPL CREATININE-BSD FRML MDRD: ABNORMAL ML/MIN/{1.73_M2}
GFR SERPL CREATININE-BSD FRML MDRD: ABNORMAL ML/MIN/{1.73_M2}
GLUCOSE BLD-MCNC: 131 MG/DL (ref 70–99)
GLUCOSE URINE: NEGATIVE
HCT VFR BLD CALC: 32.8 % (ref 41–53)
HEMOGLOBIN: 10.9 G/DL (ref 13.5–17.5)
IMMATURE GRANULOCYTES: ABNORMAL %
KETONES, URINE: NEGATIVE
LEUKOCYTE ESTERASE, URINE: NEGATIVE
LYMPHOCYTES # BLD: 9 % (ref 13–44)
MCH RBC QN AUTO: 30.9 PG (ref 26–34)
MCHC RBC AUTO-ENTMCNC: 33.3 G/DL (ref 31–37)
MCV RBC AUTO: 92.7 FL (ref 80–100)
MONOCYTES # BLD: 11 % (ref 5–9)
NITRITE, URINE: NEGATIVE
NRBC AUTOMATED: ABNORMAL PER 100 WBC
PDW BLD-RTO: 14.6 % (ref 12.1–15.2)
PH UA: 5 (ref 5–8)
PLATELET # BLD: 251 K/UL (ref 140–450)
PLATELET ESTIMATE: ABNORMAL
PMV BLD AUTO: ABNORMAL FL (ref 6–12)
POTASSIUM SERPL-SCNC: 3.9 MMOL/L (ref 3.7–5.3)
PROTEIN UA: NEGATIVE
RBC # BLD: 3.54 M/UL (ref 4.5–5.9)
RBC # BLD: ABNORMAL 10*6/UL
SEG NEUTROPHILS: 80 % (ref 39–75)
SEGMENTED NEUTROPHILS ABSOLUTE COUNT: 9.4 K/UL (ref 2.1–6.5)
SODIUM BLD-SCNC: 143 MMOL/L (ref 135–144)
SPECIFIC GRAVITY UA: 1.02 (ref 1–1.03)
TOTAL PROTEIN: 6.1 G/DL (ref 6.4–8.3)
TURBIDITY: CLEAR
URINE HGB: NEGATIVE
UROBILINOGEN, URINE: NORMAL
WBC # BLD: 11.8 K/UL (ref 3.5–11)
WBC # BLD: ABNORMAL 10*3/UL

## 2021-07-13 PROCEDURE — 94640 AIRWAY INHALATION TREATMENT: CPT

## 2021-07-13 PROCEDURE — 36415 COLL VENOUS BLD VENIPUNCTURE: CPT

## 2021-07-13 PROCEDURE — 6370000000 HC RX 637 (ALT 250 FOR IP): Performed by: SURGERY

## 2021-07-13 PROCEDURE — 6360000002 HC RX W HCPCS: Performed by: INTERNAL MEDICINE

## 2021-07-13 PROCEDURE — 6370000000 HC RX 637 (ALT 250 FOR IP): Performed by: INTERNAL MEDICINE

## 2021-07-13 PROCEDURE — 81003 URINALYSIS AUTO W/O SCOPE: CPT

## 2021-07-13 PROCEDURE — 2580000003 HC RX 258: Performed by: INTERNAL MEDICINE

## 2021-07-13 PROCEDURE — 80053 COMPREHEN METABOLIC PANEL: CPT

## 2021-07-13 PROCEDURE — 2580000003 HC RX 258: Performed by: SURGERY

## 2021-07-13 PROCEDURE — 85025 COMPLETE CBC W/AUTO DIFF WBC: CPT

## 2021-07-13 PROCEDURE — 1200000000 HC SEMI PRIVATE

## 2021-07-13 RX ADMIN — DOXYCYCLINE HYCLATE 100 MG: 100 TABLET, COATED ORAL at 09:56

## 2021-07-13 RX ADMIN — MIDODRINE HYDROCHLORIDE 5 MG: 5 TABLET ORAL at 16:28

## 2021-07-13 RX ADMIN — OXYCODONE HYDROCHLORIDE AND ACETAMINOPHEN 500 MG: 500 TABLET ORAL at 08:10

## 2021-07-13 RX ADMIN — ATORVASTATIN CALCIUM 40 MG: 40 TABLET, FILM COATED ORAL at 20:04

## 2021-07-13 RX ADMIN — TAMSULOSIN HYDROCHLORIDE 0.4 MG: 0.4 CAPSULE ORAL at 08:10

## 2021-07-13 RX ADMIN — Medication 1200 UNITS: at 08:09

## 2021-07-13 RX ADMIN — IPRATROPIUM BROMIDE AND ALBUTEROL SULFATE 1 AMPULE: .5; 3 SOLUTION RESPIRATORY (INHALATION) at 10:42

## 2021-07-13 RX ADMIN — TERBUTALINE SULFATE 2.5 MG: 2.5 TABLET ORAL at 12:04

## 2021-07-13 RX ADMIN — MIDODRINE HYDROCHLORIDE 5 MG: 5 TABLET ORAL at 12:04

## 2021-07-13 RX ADMIN — HYDROXYZINE PAMOATE 50 MG: 25 CAPSULE ORAL at 20:09

## 2021-07-13 RX ADMIN — GABAPENTIN 300 MG: 300 CAPSULE ORAL at 08:10

## 2021-07-13 RX ADMIN — SPIRONOLACTONE 25 MG: 25 TABLET, FILM COATED ORAL at 08:10

## 2021-07-13 RX ADMIN — TRAMADOL HYDROCHLORIDE 50 MG: 50 TABLET, FILM COATED ORAL at 01:39

## 2021-07-13 RX ADMIN — MIDODRINE HYDROCHLORIDE 5 MG: 5 TABLET ORAL at 08:09

## 2021-07-13 RX ADMIN — TERBUTALINE SULFATE 2.5 MG: 2.5 TABLET ORAL at 18:04

## 2021-07-13 RX ADMIN — IPRATROPIUM BROMIDE AND ALBUTEROL SULFATE 1 AMPULE: .5; 3 SOLUTION RESPIRATORY (INHALATION) at 06:24

## 2021-07-13 RX ADMIN — TERBUTALINE SULFATE 2.5 MG: 2.5 TABLET ORAL at 23:43

## 2021-07-13 RX ADMIN — IPRATROPIUM BROMIDE AND ALBUTEROL SULFATE 1 AMPULE: .5; 3 SOLUTION RESPIRATORY (INHALATION) at 21:17

## 2021-07-13 RX ADMIN — CLOPIDOGREL BISULFATE 75 MG: 75 TABLET ORAL at 08:10

## 2021-07-13 RX ADMIN — FUROSEMIDE 20 MG: 10 INJECTION, SOLUTION INTRAMUSCULAR; INTRAVENOUS at 08:09

## 2021-07-13 RX ADMIN — SODIUM CHLORIDE, PRESERVATIVE FREE 10 ML: 5 INJECTION INTRAVENOUS at 09:02

## 2021-07-13 RX ADMIN — PANTOPRAZOLE SODIUM 40 MG: 40 TABLET, DELAYED RELEASE ORAL at 06:18

## 2021-07-13 RX ADMIN — TERBUTALINE SULFATE 2.5 MG: 2.5 TABLET ORAL at 06:18

## 2021-07-13 RX ADMIN — SODIUM CHLORIDE, PRESERVATIVE FREE 10 ML: 5 INJECTION INTRAVENOUS at 08:09

## 2021-07-13 RX ADMIN — CHOLECALCIFEROL TAB 25 MCG (1000 UNIT) 1000 UNITS: 25 TAB at 08:10

## 2021-07-13 RX ADMIN — GABAPENTIN 300 MG: 300 CAPSULE ORAL at 20:04

## 2021-07-13 RX ADMIN — PREDNISONE 40 MG: 20 TABLET ORAL at 08:10

## 2021-07-13 RX ADMIN — Medication 1 CAPSULE: at 08:09

## 2021-07-13 RX ADMIN — FUROSEMIDE 20 MG: 10 INJECTION, SOLUTION INTRAMUSCULAR; INTRAVENOUS at 18:04

## 2021-07-13 RX ADMIN — Medication 1 CAPSULE: at 16:28

## 2021-07-13 RX ADMIN — SODIUM CHLORIDE, PRESERVATIVE FREE 10 ML: 5 INJECTION INTRAVENOUS at 20:05

## 2021-07-13 RX ADMIN — DOXYCYCLINE HYCLATE 100 MG: 100 TABLET, COATED ORAL at 21:33

## 2021-07-13 RX ADMIN — IPRATROPIUM BROMIDE AND ALBUTEROL SULFATE 1 AMPULE: .5; 3 SOLUTION RESPIRATORY (INHALATION) at 16:20

## 2021-07-13 RX ADMIN — CEFTRIAXONE SODIUM 1000 MG: 1 INJECTION, POWDER, FOR SOLUTION INTRAMUSCULAR; INTRAVENOUS at 08:08

## 2021-07-13 ASSESSMENT — PAIN SCALES - GENERAL
PAINLEVEL_OUTOF10: 2
PAINLEVEL_OUTOF10: 5
PAINLEVEL_OUTOF10: 1
PAINLEVEL_OUTOF10: 2
PAINLEVEL_OUTOF10: 3
PAINLEVEL_OUTOF10: 5
PAINLEVEL_OUTOF10: 0
PAINLEVEL_OUTOF10: 0
PAINLEVEL_OUTOF10: 1
PAINLEVEL_OUTOF10: 2

## 2021-07-13 ASSESSMENT — PAIN DESCRIPTION - ONSET
ONSET: ON-GOING
ONSET: GRADUAL

## 2021-07-13 ASSESSMENT — PAIN DESCRIPTION - LOCATION
LOCATION: LEG

## 2021-07-13 ASSESSMENT — PAIN DESCRIPTION - FREQUENCY
FREQUENCY: INTERMITTENT
FREQUENCY: INTERMITTENT
FREQUENCY: CONTINUOUS
FREQUENCY: INTERMITTENT

## 2021-07-13 ASSESSMENT — PAIN DESCRIPTION - PAIN TYPE
TYPE: ACUTE PAIN
TYPE: ACUTE PAIN

## 2021-07-13 ASSESSMENT — PAIN DESCRIPTION - DESCRIPTORS
DESCRIPTORS: SHOOTING
DESCRIPTORS: SHARP;SHOOTING
DESCRIPTORS: SHOOTING

## 2021-07-13 ASSESSMENT — PAIN DESCRIPTION - ORIENTATION
ORIENTATION: RIGHT

## 2021-07-13 NOTE — CONSULTS
[place] [id number]                                  CONSULTATION    PATIENT NAME: Debra Virk                   :        1930  MED REC NO:   [id number]                              ROOM:       [id number]  ACCOUNT NO:   [de-identified]                           ADMIT DATE: 2021  PROVIDER:     Marcus Almaraz    CONSULT DATE:  2021    REASON FOR CONSULT:  Cardiac evaluation for increased weight consistent  with CHF. HISTORY OF PRESENT ILLNESS:  The patient is a 59-year-old very complex  gentleman whom I met on 10/28/2020 when he was admitted for cellulitis  with also marked edema and breakdown of the skin. He was admitted on  2021 for essentially the same reason with cellulitis of his right  leg. He lives with his daughter and has a history of self-medicating  with mainly  and homeopathic creams and topical agents. Dr. Ruth [place] with [facility] has treated his cellulitis many times and  each time has resolved the cellulitis and skin breakdown. He was admitted on  and antibiotics were started along with  wrapping of the legs. He was progressing nicely; however, he developed  some renal insufficiency. He is on low-dose Aldactone at 12.5 mg daily  along with Lasix 20 mg every other day at home. His diuretics were stopped because of acute on chronic renal  insufficiency. His weight has blossomed. He developed marked swelling  of his abdomen. He was uncomfortable with his abdominal swelling. He  has had some increase in his pedal edema, but it is mainly abdominal  swelling. I was asked to see him for his increased weight and abdominal  swelling. He is a very independent 59-year-old gentleman, lives with his daughter,  whose children live close by. He is very limited in his activity.   He  has a history of peripheral vascular disease with stenting of his right  lower extremity. He also has paroxysmal atrial fibrillation and is not  anticoagulated because of falls. He is on Plavix. He had been hospitalized in 04/2020 and also 07/2020 at [facility]. At that time, he did have atrial fibrillation with a slow ventricular  response. There was consideration of a dual pacemaker, but he refused. He has been in sinus rhythm when I have seen him. He has never had  cardiac catheterization. He does have peripheral vascular disease with  stenting of the lower extremity. When I see him, he is resting comfortably. He is awake and alert,  answers questions appropriately. He does state that his abdomen is  markedly swollen over the last 3 days. He denies any chest pain or  chest discomfort, really denies much shortness of breath although he has  been very inactive, but he has been essentially bed-bound here in the  hospital.  He has had no PND or orthopnea. His appetite is fair and he has no abdominal discomfort. His bowels  have been moving. He has good bowel sounds. Again, he has never had a myocardial infarction or cardiac  catheterization to my knowledge. His blood pressure has been low. CARDIAC RISK FACTORS:  Known CAD:  Negative. Peripheral Vascular Disease:  Positive. Hypertension:  Positive. Hyperlipidemia:  Positive. Smoking:  Positive. Diabetes:  Negative. MEDICATIONS PRIOR TO ADMISSION:  He was on lisinopril 10 mg daily,  rifampin 300 mg daily, Neurontin 300 mg b.i.d., Lasix 20 mg every other  day, Flomax 0.4 mg b.i.d., Aldactone 25 mg half a tablet daily, Plavix  75 mg daily, multivitamins daily, Lipitor 40 mg daily, magnesium 100 mg  daily, melatonin 5 mg daily p.r.n., albuterol p.r.n. PAST MEDICAL AND SURGICAL HISTORY:  1. History of hyperlipidemia and hypertension. 2.  COPD. 3.  He has had tonsillectomy. 4.  Right leg angioplasty and stent placement.     FAMILY HISTORY:  No significant heart disease in the family. SOCIAL HISTORY:  He is 80years old, . He was a roth. He  smokes 2 packs of cigarettes a day. He has two children, with him  living with his daughter. He is very inactive at home. REVIEW OF SYSTEMS:  Cardiac as above. Other systems reviewed including  cardiovascular, respiratory, GI, , musculoskeletal, integumentary,  neurologic, endocrine, hematologic and allergic/immunologic, which were  all negative except for what is described above. He does complain of  his abdomen having marked swelling. PHYSICAL EXAMINATION:  VITAL SIGNS:  His blood pressure was 110/64 with a heart rate of 66 and  regular. Respiratory rate 18. O2 saturation was 95% on room air. GENERAL:  He is a pleasant 80year-old gentleman, complaining of his  abdomen being swollen otherwise. Denied pain. He was oriented to  person, place and time. Answered questions appropriately. SKIN:  His legs are wrapped and I did not unwrap to look at his ulcers. HEENT:  The pupils are equally round and reactive to light and  accommodation. Extraocular movements were intact. Mucous membranes  were dry. NECK:  Did not appear to have JVD, although it was difficult to tell  with him. No lymphadenopathy or thyromegaly. CARDIOVASCULAR EXAM:  S1 and S2 were normal.  No S3 or S4. Soft  systolic blowing type murmur. No diastolic murmur. PMI was normal.  No  lift, thrust, or pericardial friction rub. LUNGS:  Had a few crackles bilaterally. ABDOMEN:  Very swollen although nontender. Bowel sounds were present. Could not feel liver, spleen or aorta. EXTREMITIES:  His legs were wrapped. They were edematous. NEUROLOGIC EXAM:  Unremarkable. PSYCHIATRIC EXAM:  Unremarkable. LABORATORY DATA:  Sodium 144, potassium 3.8, BUN 35, creatinine 1.37. His white count on 07/09 was 6.3 with a hemoglobin of 9.8 and a platelet  count of 370,771.     EKG showed sinus rhythm with a first-degree AV block and possible old  inferior and

## 2021-07-13 NOTE — PROGRESS NOTES
Dr. Dickinson Fuel called in. Update given including latest vitals and I&O. No new orders at this time.

## 2021-07-13 NOTE — PROGRESS NOTES
Pt awake at shift report, but reported he has only slept juan daniel 1 hour despite HS melatonin and prn pain med. He would like to nap -this writer informs him she will return prior to brk for VS and assessment so that he may rest util brk. Urine collected per nightshift nurse and this writer takes specimen to lab.  Electronically signed by Oniel Bains RN on 7/13/2021 at 7:50 AM

## 2021-07-13 NOTE — PROGRESS NOTES
Up from chair to BR with standby assist. Tolerated well. Large soft,loose BM along with 300 mL of voided urine. Lupe care done. Returned to bed. Wound care done on bilateral lower extremities. Legs re-wrapped. Repositioned in bed. Call light in reach. Bed alarm on.

## 2021-07-13 NOTE — PROGRESS NOTES
At patient bedside with Magan Mccoy RN getting report. Patient reports that he spilled his urinal. Chux saturated with large amount of urine, urinal empty. Lupe care provided. Bed pad and gown changed. Pt. Up to chair with standby assist. Vitals and assessment as charted. Call light in reach. No further needs at this time. Family at bedside.

## 2021-07-13 NOTE — PROGRESS NOTES
Dot #9              Subjective: Denies worsening shortness of breath. Has had a good day . He reports his bowels are working. He reports pains in his right leg are mostly gone. Objective: chest slow expiration no wheezing. Clean bilaterally. Card rrr occ. Premature beat. Abdomen soft normal bowel sounds. Carotids no bruits. Wounds right leg continue to show signs of healing and ulcerations are closing in with good dermal buds in medial ulceration and near complete reepithelialization of the lateral ulcer. Wounds treated and redressed sterile gauze and ace wrapped. Left leg skin care has restored soft not cracking surface to the lower extremity. It is ace wrapped. Afebrile with stable vs and urine out put response to diuretics is good. Electrolytes preserved and creatinine is 1.44 . [facility] Net I and o -600. Assessment: acute on chronic diastolic CHF, acute on chronic renal  Disease ,anemia,  MRSA,serratia, proteus cellulitis chronic venous stasis disease of lower extremities,copd            Plan: continue present regimen. If continues to respond to diuretics then will consider transfer to swing bed status. Home going is his goals and desire and we shall arrange things with his daughter in that regard. All of his questions answered. Evaluation and involvement cardiology service greatly appreciated.         Ryne Reardon MD

## 2021-07-13 NOTE — PROGRESS NOTES
Pt is restless up in chair. Sharp, shooting pain in the RLE rated 5/10. Tramadol given for pain. Repositioned up in chair. Call light in reach. 0210- Pt states that Tramadol did not help with the pain. Pt does not want Dr. Narayan Forward for alternative pain medication. 0240- Pt calls out saying that the ACE wrap needs taken off of RLE. This RN removed ACE wrap at this time. Pt states that the sore areas on RLE are 8/10 pain. This RN offers to reposition patient or call Doctor for alternative pain medication and patient refuses. ACE wrap re-applied. Pt back to bed with standby assist. Repositioned in bed with pillow support. Call light in reach. Bed alarm on.

## 2021-07-13 NOTE — PLAN OF CARE
Problem: Falls - Risk of:  Goal: Will remain free from falls  Description: Will remain free from falls  7/12/2021 2340 by Alexander Sheffield RN  Outcome: Met This Shift     Problem: Falls - Risk of:  Goal: Absence of physical injury  Description: Absence of physical injury  7/12/2021 2340 by Alexander Sheffield RN  Outcome: Met This Shift     Problem: Pain:  Goal: Pain level will decrease  Description: Pain level will decrease  Outcome: Met This Shift     Problem: Skin Integrity:  Goal: Will show no infection signs and symptoms  Description: Will show no infection signs and symptoms  7/12/2021 2340 by Alexander Sheffield RN  Outcome: Ongoing     Problem: Skin Integrity:  Goal: Absence of new skin breakdown  Description: Absence of new skin breakdown  7/12/2021 2340 by Alexander Sheffield RN  Outcome: Ongoing     Problem: Discharge Planning:  Goal: Discharged to appropriate level of care  Description: Discharged to appropriate level of care  Outcome: Ongoing     Problem:  Activity Intolerance:  Goal: Ability to tolerate increased activity will improve  Description: Ability to tolerate increased activity will improve  7/12/2021 2340 by Alexander Sheffield RN  Outcome: Ongoing     Problem: Airway Clearance - Ineffective:  Goal: Ability to maintain a clear airway will improve  Description: Ability to maintain a clear airway will improve  Outcome: Met This Shift     Problem: Mobility - Impaired:  Goal: Mobility will improve to maximum level  Description: Mobility will improve to maximum level  Outcome: Ongoing     Problem: HEMODYNAMIC STATUS  Goal: Patient has stable vital signs and fluid balance  Outcome: Ongoing     Problem: FLUID AND ELECTROLYTE IMBALANCE  Goal: Fluid and electrolyte balance are achieved/maintained  Outcome: Ongoing

## 2021-07-14 ENCOUNTER — HOSPITAL ENCOUNTER (INPATIENT)
Age: 86
LOS: 2 days | Discharge: HOME | DRG: 602 | End: 2021-07-16
Attending: SURGERY | Admitting: SURGERY
Payer: MEDICARE

## 2021-07-14 VITALS
RESPIRATION RATE: 18 BRPM | TEMPERATURE: 97.7 F | OXYGEN SATURATION: 99 % | DIASTOLIC BLOOD PRESSURE: 58 MMHG | BODY MASS INDEX: 39.01 KG/M2 | WEIGHT: 228.5 LBS | SYSTOLIC BLOOD PRESSURE: 130 MMHG | HEART RATE: 70 BPM | HEIGHT: 64 IN

## 2021-07-14 LAB
ANION GAP SERPL CALCULATED.3IONS-SCNC: 8 MMOL/L (ref 9–17)
BUN BLDV-MCNC: 36 MG/DL (ref 8–23)
BUN/CREAT BLD: 27 (ref 9–20)
CALCIUM SERPL-MCNC: 8.9 MG/DL (ref 8.6–10.4)
CHLORIDE BLD-SCNC: 109 MMOL/L (ref 98–107)
CO2: 25 MMOL/L (ref 20–31)
CREAT SERPL-MCNC: 1.33 MG/DL (ref 0.7–1.2)
GFR AFRICAN AMERICAN: >60 ML/MIN
GFR NON-AFRICAN AMERICAN: 50 ML/MIN
GFR SERPL CREATININE-BSD FRML MDRD: ABNORMAL ML/MIN/{1.73_M2}
GFR SERPL CREATININE-BSD FRML MDRD: ABNORMAL ML/MIN/{1.73_M2}
GLUCOSE BLD-MCNC: 97 MG/DL (ref 70–99)
POTASSIUM SERPL-SCNC: 3.8 MMOL/L (ref 3.7–5.3)
SODIUM BLD-SCNC: 142 MMOL/L (ref 135–144)

## 2021-07-14 PROCEDURE — 2580000003 HC RX 258: Performed by: INTERNAL MEDICINE

## 2021-07-14 PROCEDURE — 6360000002 HC RX W HCPCS: Performed by: INTERNAL MEDICINE

## 2021-07-14 PROCEDURE — 97166 OT EVAL MOD COMPLEX 45 MIN: CPT

## 2021-07-14 PROCEDURE — 6370000000 HC RX 637 (ALT 250 FOR IP): Performed by: SURGERY

## 2021-07-14 PROCEDURE — 97535 SELF CARE MNGMENT TRAINING: CPT

## 2021-07-14 PROCEDURE — 6370000000 HC RX 637 (ALT 250 FOR IP): Performed by: INTERNAL MEDICINE

## 2021-07-14 PROCEDURE — 99231 SBSQ HOSP IP/OBS SF/LOW 25: CPT | Performed by: INTERNAL MEDICINE

## 2021-07-14 PROCEDURE — 94640 AIRWAY INHALATION TREATMENT: CPT

## 2021-07-14 PROCEDURE — 94761 N-INVAS EAR/PLS OXIMETRY MLT: CPT

## 2021-07-14 PROCEDURE — 6360000002 HC RX W HCPCS: Performed by: SURGERY

## 2021-07-14 PROCEDURE — 2580000003 HC RX 258: Performed by: SURGERY

## 2021-07-14 PROCEDURE — 36415 COLL VENOUS BLD VENIPUNCTURE: CPT

## 2021-07-14 PROCEDURE — 80048 BASIC METABOLIC PNL TOTAL CA: CPT

## 2021-07-14 PROCEDURE — 1200000002 HC SEMI PRIVATE SWING BED

## 2021-07-14 RX ORDER — SPIRONOLACTONE 25 MG/1
25 TABLET ORAL DAILY
Status: DISCONTINUED | OUTPATIENT
Start: 2021-07-15 | End: 2021-07-14 | Stop reason: DRUGHIGH

## 2021-07-14 RX ORDER — PREDNISONE 20 MG/1
20 TABLET ORAL DAILY
Status: CANCELLED | OUTPATIENT
Start: 2021-07-15 | End: 2021-07-16

## 2021-07-14 RX ORDER — LEVALBUTEROL INHALATION SOLUTION 1.25 MG/3ML
1.25 SOLUTION RESPIRATORY (INHALATION)
Status: CANCELLED | OUTPATIENT
Start: 2021-07-14

## 2021-07-14 RX ORDER — FUROSEMIDE 20 MG/1
20 TABLET ORAL EVERY OTHER DAY
Status: DISCONTINUED | OUTPATIENT
Start: 2021-07-16 | End: 2021-07-16 | Stop reason: HOSPADM

## 2021-07-14 RX ORDER — SODIUM CHLORIDE 0.9 % (FLUSH) 0.9 %
5-40 SYRINGE (ML) INJECTION 2 TIMES DAILY
Status: DISCONTINUED | OUTPATIENT
Start: 2021-07-14 | End: 2021-07-16 | Stop reason: HOSPADM

## 2021-07-14 RX ORDER — CHOLECALCIFEROL (VITAMIN D3) 125 MCG
5 CAPSULE ORAL DAILY PRN
Status: CANCELLED | OUTPATIENT
Start: 2021-07-14

## 2021-07-14 RX ORDER — LANOLIN ALCOHOL/MO/W.PET/CERES
6 CREAM (GRAM) TOPICAL NIGHTLY PRN
Status: DISCONTINUED | OUTPATIENT
Start: 2021-07-14 | End: 2021-07-16 | Stop reason: HOSPADM

## 2021-07-14 RX ORDER — FUROSEMIDE 20 MG/1
20 TABLET ORAL EVERY OTHER DAY
Status: CANCELLED | OUTPATIENT
Start: 2021-07-14

## 2021-07-14 RX ORDER — LISINOPRIL 10 MG/1
10 TABLET ORAL DAILY
Status: CANCELLED | OUTPATIENT
Start: 2021-07-15

## 2021-07-14 RX ORDER — SPIRONOLACTONE 25 MG/1
12.5 TABLET ORAL DAILY
Status: DISCONTINUED | OUTPATIENT
Start: 2021-07-15 | End: 2021-07-16 | Stop reason: HOSPADM

## 2021-07-14 RX ORDER — GABAPENTIN 300 MG/1
300 CAPSULE ORAL 2 TIMES DAILY
Status: DISCONTINUED | OUTPATIENT
Start: 2021-07-14 | End: 2021-07-16 | Stop reason: HOSPADM

## 2021-07-14 RX ORDER — ALBUTEROL SULFATE 90 UG/1
2 AEROSOL, METERED RESPIRATORY (INHALATION) 4 TIMES DAILY
Status: CANCELLED | OUTPATIENT
Start: 2021-07-14

## 2021-07-14 RX ORDER — VITAMIN B COMPLEX
1000 TABLET ORAL DAILY
Status: DISCONTINUED | OUTPATIENT
Start: 2021-07-15 | End: 2021-07-16 | Stop reason: HOSPADM

## 2021-07-14 RX ORDER — POLYETHYLENE GLYCOL 3350 17 G/17G
17 POWDER, FOR SOLUTION ORAL DAILY PRN
Status: CANCELLED | OUTPATIENT
Start: 2021-07-14

## 2021-07-14 RX ORDER — CLOPIDOGREL BISULFATE 75 MG/1
75 TABLET ORAL DAILY
Status: DISCONTINUED | OUTPATIENT
Start: 2021-07-15 | End: 2021-07-14

## 2021-07-14 RX ORDER — VITAMIN B COMPLEX
1000 TABLET ORAL DAILY
Status: CANCELLED | OUTPATIENT
Start: 2021-07-15

## 2021-07-14 RX ORDER — VITAMIN E 268 MG
1000 CAPSULE ORAL DAILY
Status: DISCONTINUED | OUTPATIENT
Start: 2021-07-15 | End: 2021-07-14 | Stop reason: SDUPTHER

## 2021-07-14 RX ORDER — LACTOBACILLUS RHAMNOSUS GG 10B CELL
1 CAPSULE ORAL 2 TIMES DAILY WITH MEALS
Status: CANCELLED | OUTPATIENT
Start: 2021-07-14

## 2021-07-14 RX ORDER — IPRATROPIUM BROMIDE AND ALBUTEROL SULFATE 2.5; .5 MG/3ML; MG/3ML
1 SOLUTION RESPIRATORY (INHALATION) 4 TIMES DAILY
Status: CANCELLED | OUTPATIENT
Start: 2021-07-14

## 2021-07-14 RX ORDER — CHOLECALCIFEROL (VITAMIN D3) 125 MCG
5 CAPSULE ORAL NIGHTLY PRN
Status: DISCONTINUED | OUTPATIENT
Start: 2021-07-14 | End: 2021-07-14 | Stop reason: SDUPTHER

## 2021-07-14 RX ORDER — VITAMIN E 268 MG
1000 CAPSULE ORAL DAILY
Status: CANCELLED | OUTPATIENT
Start: 2021-07-15

## 2021-07-14 RX ORDER — GABAPENTIN 300 MG/1
300 CAPSULE ORAL 2 TIMES DAILY
Status: CANCELLED | OUTPATIENT
Start: 2021-07-14

## 2021-07-14 RX ORDER — DOXYCYCLINE HYCLATE 100 MG
100 TABLET ORAL EVERY 12 HOURS SCHEDULED
Status: DISCONTINUED | OUTPATIENT
Start: 2021-07-14 | End: 2021-07-16 | Stop reason: HOSPADM

## 2021-07-14 RX ORDER — CLOPIDOGREL BISULFATE 75 MG/1
75 TABLET ORAL DAILY
Status: CANCELLED | OUTPATIENT
Start: 2021-07-15

## 2021-07-14 RX ORDER — HYDROXYZINE PAMOATE 25 MG/1
50 CAPSULE ORAL EVERY 6 HOURS PRN
Status: DISCONTINUED | OUTPATIENT
Start: 2021-07-14 | End: 2021-07-14

## 2021-07-14 RX ORDER — HYDROXYZINE PAMOATE 25 MG/1
50 CAPSULE ORAL EVERY 6 HOURS PRN
Status: CANCELLED | OUTPATIENT
Start: 2021-07-14

## 2021-07-14 RX ORDER — LEVALBUTEROL INHALATION SOLUTION 1.25 MG/3ML
1.25 SOLUTION RESPIRATORY (INHALATION)
Status: DISCONTINUED | OUTPATIENT
Start: 2021-07-14 | End: 2021-07-16 | Stop reason: HOSPADM

## 2021-07-14 RX ORDER — GABAPENTIN 300 MG/1
300 CAPSULE ORAL 2 TIMES DAILY
Status: DISCONTINUED | OUTPATIENT
Start: 2021-07-14 | End: 2021-07-14

## 2021-07-14 RX ORDER — ATORVASTATIN CALCIUM 40 MG/1
40 TABLET, FILM COATED ORAL NIGHTLY
Status: DISCONTINUED | OUTPATIENT
Start: 2021-07-14 | End: 2021-07-16 | Stop reason: HOSPADM

## 2021-07-14 RX ORDER — MIDODRINE HYDROCHLORIDE 5 MG/1
5 TABLET ORAL
Status: CANCELLED | OUTPATIENT
Start: 2021-07-14

## 2021-07-14 RX ORDER — TAMSULOSIN HYDROCHLORIDE 0.4 MG/1
0.4 CAPSULE ORAL DAILY
Status: CANCELLED | OUTPATIENT
Start: 2021-07-15

## 2021-07-14 RX ORDER — TAMSULOSIN HYDROCHLORIDE 0.4 MG/1
0.4 CAPSULE ORAL 2 TIMES DAILY
Status: DISCONTINUED | OUTPATIENT
Start: 2021-07-14 | End: 2021-07-16 | Stop reason: HOSPADM

## 2021-07-14 RX ORDER — ASCORBIC ACID 500 MG
500 TABLET ORAL DAILY
Status: CANCELLED | OUTPATIENT
Start: 2021-07-15

## 2021-07-14 RX ORDER — ASCORBIC ACID 500 MG
500 TABLET ORAL DAILY
Status: DISCONTINUED | OUTPATIENT
Start: 2021-07-15 | End: 2021-07-16 | Stop reason: HOSPADM

## 2021-07-14 RX ORDER — PANTOPRAZOLE SODIUM 40 MG/1
40 TABLET, DELAYED RELEASE ORAL
Status: CANCELLED | OUTPATIENT
Start: 2021-07-15

## 2021-07-14 RX ORDER — ALBUTEROL SULFATE 90 UG/1
2 AEROSOL, METERED RESPIRATORY (INHALATION) EVERY 6 HOURS PRN
Status: CANCELLED | OUTPATIENT
Start: 2021-07-14

## 2021-07-14 RX ORDER — TERBUTALINE SULFATE 2.5 MG/1
2.5 TABLET ORAL EVERY 6 HOURS SCHEDULED
Status: CANCELLED | OUTPATIENT
Start: 2021-07-14

## 2021-07-14 RX ORDER — LISINOPRIL 10 MG/1
10 TABLET ORAL DAILY
Status: DISCONTINUED | OUTPATIENT
Start: 2021-07-14 | End: 2021-07-16 | Stop reason: HOSPADM

## 2021-07-14 RX ORDER — LISINOPRIL 10 MG/1
10 TABLET ORAL DAILY
Status: DISCONTINUED | OUTPATIENT
Start: 2021-07-15 | End: 2021-07-14

## 2021-07-14 RX ORDER — BISACODYL 10 MG
10 SUPPOSITORY, RECTAL RECTAL DAILY PRN
Status: DISCONTINUED | OUTPATIENT
Start: 2021-07-14 | End: 2021-07-16 | Stop reason: HOSPADM

## 2021-07-14 RX ORDER — ATORVASTATIN CALCIUM 40 MG/1
40 TABLET, FILM COATED ORAL NIGHTLY
Status: CANCELLED | OUTPATIENT
Start: 2021-07-14

## 2021-07-14 RX ORDER — CEFTRIAXONE 1 G/1
1000 INJECTION, POWDER, FOR SOLUTION INTRAMUSCULAR; INTRAVENOUS DAILY
Status: DISCONTINUED | OUTPATIENT
Start: 2021-07-15 | End: 2021-07-15

## 2021-07-14 RX ORDER — SODIUM CHLORIDE 0.9 % (FLUSH) 0.9 %
5-40 SYRINGE (ML) INJECTION 2 TIMES DAILY
Status: CANCELLED | OUTPATIENT
Start: 2021-07-14

## 2021-07-14 RX ORDER — TRAMADOL HYDROCHLORIDE 50 MG/1
50 TABLET ORAL EVERY 12 HOURS PRN
Status: DISCONTINUED | OUTPATIENT
Start: 2021-07-14 | End: 2021-07-16 | Stop reason: HOSPADM

## 2021-07-14 RX ORDER — ALBUTEROL SULFATE 90 UG/1
2 AEROSOL, METERED RESPIRATORY (INHALATION) 4 TIMES DAILY
Status: DISCONTINUED | OUTPATIENT
Start: 2021-07-14 | End: 2021-07-16 | Stop reason: HOSPADM

## 2021-07-14 RX ORDER — CLOPIDOGREL BISULFATE 75 MG/1
75 TABLET ORAL DAILY
Status: DISCONTINUED | OUTPATIENT
Start: 2021-07-15 | End: 2021-07-16 | Stop reason: HOSPADM

## 2021-07-14 RX ORDER — VITAMIN B COMPLEX
1000 TABLET ORAL DAILY
Status: DISCONTINUED | OUTPATIENT
Start: 2021-07-15 | End: 2021-07-14

## 2021-07-14 RX ORDER — CALCIUM CARBONATE 260MG(650)
100 TABLET,CHEWABLE ORAL DAILY
Status: CANCELLED | OUTPATIENT
Start: 2021-07-14

## 2021-07-14 RX ORDER — DOXYCYCLINE HYCLATE 100 MG
100 TABLET ORAL EVERY 12 HOURS SCHEDULED
Status: CANCELLED | OUTPATIENT
Start: 2021-07-14

## 2021-07-14 RX ORDER — PREDNISONE 20 MG/1
20 TABLET ORAL DAILY
Status: COMPLETED | OUTPATIENT
Start: 2021-07-15 | End: 2021-07-15

## 2021-07-14 RX ORDER — IPRATROPIUM BROMIDE AND ALBUTEROL SULFATE 2.5; .5 MG/3ML; MG/3ML
1 SOLUTION RESPIRATORY (INHALATION) 4 TIMES DAILY
Status: DISCONTINUED | OUTPATIENT
Start: 2021-07-14 | End: 2021-07-16 | Stop reason: HOSPADM

## 2021-07-14 RX ORDER — FUROSEMIDE 10 MG/ML
20 INJECTION INTRAMUSCULAR; INTRAVENOUS 2 TIMES DAILY
Status: CANCELLED | OUTPATIENT
Start: 2021-07-14

## 2021-07-14 RX ORDER — VITAMIN E 268 MG
1200 CAPSULE ORAL DAILY
Status: DISCONTINUED | OUTPATIENT
Start: 2021-07-15 | End: 2021-07-16 | Stop reason: HOSPADM

## 2021-07-14 RX ORDER — TERBUTALINE SULFATE 2.5 MG/1
2.5 TABLET ORAL EVERY 6 HOURS SCHEDULED
Status: DISCONTINUED | OUTPATIENT
Start: 2021-07-14 | End: 2021-07-16 | Stop reason: HOSPADM

## 2021-07-14 RX ORDER — CLOPIDOGREL BISULFATE 75 MG/1
75 TABLET ORAL DAILY
Status: CANCELLED | OUTPATIENT
Start: 2021-07-14

## 2021-07-14 RX ORDER — CALCIUM CARBONATE 260MG(650)
100 TABLET,CHEWABLE ORAL DAILY
Status: DISCONTINUED | OUTPATIENT
Start: 2021-07-14 | End: 2021-07-14 | Stop reason: CLARIF

## 2021-07-14 RX ORDER — ATORVASTATIN CALCIUM 40 MG/1
40 TABLET, FILM COATED ORAL NIGHTLY
Status: DISCONTINUED | OUTPATIENT
Start: 2021-07-14 | End: 2021-07-14

## 2021-07-14 RX ORDER — POLYETHYLENE GLYCOL 3350 17 G/17G
17 POWDER, FOR SOLUTION ORAL DAILY PRN
Status: DISCONTINUED | OUTPATIENT
Start: 2021-07-14 | End: 2021-07-16 | Stop reason: HOSPADM

## 2021-07-14 RX ORDER — MULTIVIT WITH MINERALS/LUTEIN
1000 TABLET ORAL DAILY
Status: CANCELLED | OUTPATIENT
Start: 2021-07-14

## 2021-07-14 RX ORDER — FUROSEMIDE 10 MG/ML
20 INJECTION INTRAMUSCULAR; INTRAVENOUS 2 TIMES DAILY
Status: DISCONTINUED | OUTPATIENT
Start: 2021-07-14 | End: 2021-07-16 | Stop reason: HOSPADM

## 2021-07-14 RX ORDER — BISACODYL 10 MG
10 SUPPOSITORY, RECTAL RECTAL DAILY PRN
Status: CANCELLED | OUTPATIENT
Start: 2021-07-14

## 2021-07-14 RX ORDER — CHOLECALCIFEROL (VITAMIN D3) 125 MCG
5 CAPSULE ORAL NIGHTLY PRN
Status: CANCELLED | OUTPATIENT
Start: 2021-07-14

## 2021-07-14 RX ORDER — TRAMADOL HYDROCHLORIDE 50 MG/1
50 TABLET ORAL EVERY 12 HOURS PRN
Status: CANCELLED | OUTPATIENT
Start: 2021-07-14

## 2021-07-14 RX ORDER — MIDODRINE HYDROCHLORIDE 5 MG/1
5 TABLET ORAL
Status: DISCONTINUED | OUTPATIENT
Start: 2021-07-14 | End: 2021-07-16 | Stop reason: HOSPADM

## 2021-07-14 RX ORDER — LACTOBACILLUS RHAMNOSUS GG 10B CELL
1 CAPSULE ORAL 2 TIMES DAILY WITH MEALS
Status: DISCONTINUED | OUTPATIENT
Start: 2021-07-14 | End: 2021-07-16 | Stop reason: HOSPADM

## 2021-07-14 RX ORDER — SPIRONOLACTONE 25 MG/1
12.5 TABLET ORAL DAILY
Status: CANCELLED | OUTPATIENT
Start: 2021-07-14

## 2021-07-14 RX ORDER — HYDROXYZINE PAMOATE 25 MG/1
50 CAPSULE ORAL EVERY 6 HOURS PRN
Status: DISCONTINUED | OUTPATIENT
Start: 2021-07-14 | End: 2021-07-16 | Stop reason: HOSPADM

## 2021-07-14 RX ORDER — LISINOPRIL 10 MG/1
10 TABLET ORAL DAILY
Status: CANCELLED | OUTPATIENT
Start: 2021-07-14

## 2021-07-14 RX ORDER — SPIRONOLACTONE 25 MG/1
25 TABLET ORAL DAILY
Status: CANCELLED | OUTPATIENT
Start: 2021-07-15

## 2021-07-14 RX ORDER — PANTOPRAZOLE SODIUM 40 MG/1
40 TABLET, DELAYED RELEASE ORAL
Status: DISCONTINUED | OUTPATIENT
Start: 2021-07-15 | End: 2021-07-16 | Stop reason: HOSPADM

## 2021-07-14 RX ORDER — ASCORBIC ACID 500 MG
500 TABLET ORAL DAILY
Status: CANCELLED | OUTPATIENT
Start: 2021-07-14

## 2021-07-14 RX ORDER — TAMSULOSIN HYDROCHLORIDE 0.4 MG/1
0.4 CAPSULE ORAL DAILY
Status: DISCONTINUED | OUTPATIENT
Start: 2021-07-15 | End: 2021-07-14 | Stop reason: DRUGHIGH

## 2021-07-14 RX ORDER — ASCORBIC ACID 500 MG
500 TABLET ORAL DAILY
Status: DISCONTINUED | OUTPATIENT
Start: 2021-07-14 | End: 2021-07-14 | Stop reason: SDUPTHER

## 2021-07-14 RX ORDER — ALBUTEROL SULFATE 90 UG/1
2 AEROSOL, METERED RESPIRATORY (INHALATION) EVERY 6 HOURS PRN
Status: DISCONTINUED | OUTPATIENT
Start: 2021-07-14 | End: 2021-07-16 | Stop reason: HOSPADM

## 2021-07-14 RX ORDER — TAMSULOSIN HYDROCHLORIDE 0.4 MG/1
0.4 CAPSULE ORAL 2 TIMES DAILY
Status: CANCELLED | OUTPATIENT
Start: 2021-07-14

## 2021-07-14 RX ADMIN — CEFTRIAXONE SODIUM 1000 MG: 1 INJECTION, POWDER, FOR SOLUTION INTRAMUSCULAR; INTRAVENOUS at 09:58

## 2021-07-14 RX ADMIN — Medication 1 CAPSULE: at 10:00

## 2021-07-14 RX ADMIN — MIDODRINE HYDROCHLORIDE 5 MG: 5 TABLET ORAL at 13:38

## 2021-07-14 RX ADMIN — SODIUM CHLORIDE, PRESERVATIVE FREE 10 ML: 5 INJECTION INTRAVENOUS at 22:17

## 2021-07-14 RX ADMIN — MIDODRINE HYDROCHLORIDE 5 MG: 5 TABLET ORAL at 09:59

## 2021-07-14 RX ADMIN — IPRATROPIUM BROMIDE AND ALBUTEROL SULFATE 1 AMPULE: .5; 3 SOLUTION RESPIRATORY (INHALATION) at 16:26

## 2021-07-14 RX ADMIN — IPRATROPIUM BROMIDE AND ALBUTEROL SULFATE 1 AMPULE: .5; 3 SOLUTION RESPIRATORY (INHALATION) at 20:23

## 2021-07-14 RX ADMIN — OXYCODONE HYDROCHLORIDE AND ACETAMINOPHEN 500 MG: 500 TABLET ORAL at 09:59

## 2021-07-14 RX ADMIN — GABAPENTIN 300 MG: 300 CAPSULE ORAL at 09:59

## 2021-07-14 RX ADMIN — TERBUTALINE SULFATE 2.5 MG: 2.5 TABLET ORAL at 07:43

## 2021-07-14 RX ADMIN — PREDNISONE 20 MG: 20 TABLET ORAL at 09:59

## 2021-07-14 RX ADMIN — Medication 1200 UNITS: at 09:59

## 2021-07-14 RX ADMIN — TERBUTALINE SULFATE 2.5 MG: 2.5 TABLET ORAL at 13:38

## 2021-07-14 RX ADMIN — TAMSULOSIN HYDROCHLORIDE 0.4 MG: 0.4 CAPSULE ORAL at 22:17

## 2021-07-14 RX ADMIN — TRAMADOL HYDROCHLORIDE 50 MG: 50 TABLET, FILM COATED ORAL at 01:07

## 2021-07-14 RX ADMIN — Medication 1 CAPSULE: at 18:04

## 2021-07-14 RX ADMIN — TERBUTALINE SULFATE 2.5 MG: 2.5 TABLET ORAL at 22:47

## 2021-07-14 RX ADMIN — FUROSEMIDE 20 MG: 10 INJECTION, SOLUTION INTRAMUSCULAR; INTRAVENOUS at 18:04

## 2021-07-14 RX ADMIN — ATORVASTATIN CALCIUM 40 MG: 40 TABLET, FILM COATED ORAL at 22:15

## 2021-07-14 RX ADMIN — GABAPENTIN 300 MG: 300 CAPSULE ORAL at 22:15

## 2021-07-14 RX ADMIN — IPRATROPIUM BROMIDE AND ALBUTEROL SULFATE 1 AMPULE: .5; 3 SOLUTION RESPIRATORY (INHALATION) at 10:22

## 2021-07-14 RX ADMIN — TAMSULOSIN HYDROCHLORIDE 0.4 MG: 0.4 CAPSULE ORAL at 09:59

## 2021-07-14 RX ADMIN — MIDODRINE HYDROCHLORIDE 5 MG: 5 TABLET ORAL at 18:04

## 2021-07-14 RX ADMIN — CLOPIDOGREL BISULFATE 75 MG: 75 TABLET ORAL at 09:59

## 2021-07-14 RX ADMIN — SPIRONOLACTONE 25 MG: 25 TABLET, FILM COATED ORAL at 09:59

## 2021-07-14 RX ADMIN — DOXYCYCLINE HYCLATE 100 MG: 100 TABLET, COATED ORAL at 09:59

## 2021-07-14 RX ADMIN — FUROSEMIDE 20 MG: 10 INJECTION, SOLUTION INTRAMUSCULAR; INTRAVENOUS at 09:59

## 2021-07-14 RX ADMIN — DOXYCYCLINE HYCLATE 100 MG: 100 TABLET, COATED ORAL at 22:15

## 2021-07-14 RX ADMIN — IPRATROPIUM BROMIDE AND ALBUTEROL SULFATE 1 AMPULE: .5; 3 SOLUTION RESPIRATORY (INHALATION) at 06:20

## 2021-07-14 RX ADMIN — CHOLECALCIFEROL TAB 25 MCG (1000 UNIT) 1000 UNITS: 25 TAB at 09:59

## 2021-07-14 RX ADMIN — PANTOPRAZOLE SODIUM 40 MG: 40 TABLET, DELAYED RELEASE ORAL at 07:42

## 2021-07-14 RX ADMIN — SODIUM CHLORIDE, PRESERVATIVE FREE 10 ML: 5 INJECTION INTRAVENOUS at 10:00

## 2021-07-14 RX ADMIN — TERBUTALINE SULFATE 2.5 MG: 2.5 TABLET ORAL at 18:04

## 2021-07-14 ASSESSMENT — PAIN SCALES - GENERAL
PAINLEVEL_OUTOF10: 6
PAINLEVEL_OUTOF10: 6
PAINLEVEL_OUTOF10: 0

## 2021-07-14 ASSESSMENT — PAIN DESCRIPTION - ORIENTATION: ORIENTATION: RIGHT

## 2021-07-14 ASSESSMENT — PAIN DESCRIPTION - PAIN TYPE: TYPE: ACUTE PAIN

## 2021-07-14 ASSESSMENT — PAIN DESCRIPTION - ONSET: ONSET: ON-GOING

## 2021-07-14 ASSESSMENT — PAIN DESCRIPTION - FREQUENCY: FREQUENCY: CONTINUOUS

## 2021-07-14 ASSESSMENT — PAIN DESCRIPTION - LOCATION: LOCATION: HIP

## 2021-07-14 ASSESSMENT — PAIN DESCRIPTION - DESCRIPTORS: DESCRIPTORS: DISCOMFORT;TIGHTNESS

## 2021-07-14 NOTE — PROGRESS NOTES
reduced sodium in diet)  Nutrition Education/Counseling:  No recommendation at this time   Coordination of Nutrition Care:  Continue to monitor while inpatient    Goals:  PO > 75% meals with good protein sources       Recent Labs     07/12/21  1347 07/12/21  1347 07/13/21  0515 07/14/21  0650     --  143 142   K 3.8  --  3.9 3.8   *  --  113* 109*   CO2 22  --  23 25   BUN 35*  --  36* 36*   CREATININE 1.37*  --  1.44* 1.33*   GLUCOSE 104*  --  131* 97   ALT  --   --  29  --    ALKPHOS  --   --  60  --    GFR       NOT REPORTED   < >       NOT REPORTED       NOT REPORTED    < > = values in this interval not displayed.       Lab Results   Component Value Date    LABALBU 3.0 07/13/2021      Nutrition Monitoring and Evaluation:   Behavioral-Environmental Outcomes:  Knowledge or Skill   Food/Nutrient Intake Outcomes:  Food and Nutrient Intake  Physical Signs/Symptoms Outcomes:  Biochemical Data, Weight, Fluid Status or Edema     Discharge Planning:    Continue current diet     Electronically signed by Kait Hernandez RD, LD on 7/14/21 at 10:38 AM EDT    Contact: [phone]

## 2021-07-14 NOTE — PROGRESS NOTES
Cardiology    He is resting quietly this morning. He feels his abdomen is slightly better. No unusual sob and no chest pain. VSS at this time. Exam unchanged with relatively clear lungs. Abdomen protuberant but soft. Weight down 4 pounds. Awaiting labs from this morning. So far so good. ..     Cosme Phalen MD

## 2021-07-14 NOTE — PROGRESS NOTES
[facility] Occupational Therapy  Plan of Care  OT Orders Received and Evaluation Complete  Date: 2021  Patient Name: Shamar Noel        MRN: [id number]    : 1930  (80 y.o.)  Referring Practitioner: Dr. Alex Alcantara MD     Referral Date: 2021  Diagnosis: Cellulitis  Treatment Diagnosis: M62.81-Generalized weakness    Identified Problem Areas  Performance deficits / Impairments: Decreased functional mobility , Decreased ADL status, Decreased strength, Decreased endurance  Assessment: Patient is a 81 y/o male admitted to the Swing Bed Unit due cellulitis in legs. Patient demonstrates decreased strength and endurance overall that is limiting his independence with ADLs at [facility]. Patient to benefit from OT services in order to address these deficits. Prognosis: Good     Justification for Skilled Services:  [x]  Complete Daily Tasks Safely  [] Improve Balance   [x]  Return to Prior Level of Function  [x]  Family/Caregiver Education    [x] Improve UE strength  [x] Patient Education: []Adaptive Equipment   [x]Home Exercise Program and Progression    Treatment Plan  Plan  Times per week: 7  Times per day: Daily (1-2 tmes)  Current Treatment Recommendations: Strengthening, Endurance Training, Functional Mobility Training, Self-Care / ADL, Safety Education & Training  [] Modalities:  [x] Therapeutic Exercise   [x] Therapeutic Activity  [] Splinting:     [] Home Safety Evaluation         [x] ADL Retraining                       [] Muscle Re-education [] Cognitive Retraining            [] Sensory Integration  [x] Patient Education [x] Home Exercise Program [x] Fine Motor Coordination  Discharge Recommendations: Continue to assess pending progress, Home with assist PRN    GOALS  Short term goals  Time Frame for Short term goals: 3 days  Short term goal 1: Patient to tolerate 15 minutes ther-ex/ther-act to improve strength/endurance for ADLs.   Short term goal 2: Patient to complete functional mobility with LRAD with SUP. Short term goal 3: Patient to complete UB/LB bathing/dressing with setup. Short term goal 4: Patient to tolerate >5 minutes dynamic standing during functional tasks with SUP.       JULIET Nguyen/KENYON                  Date: 7/14/2021

## 2021-07-14 NOTE — PLAN OF CARE
Problem: Falls - Risk of:  Goal: Will remain free from falls  Description: Will remain free from falls  Outcome: Met This Shift  Goal: Absence of physical injury  Description: Absence of physical injury  Outcome: Met This Shift     Problem: Pain:  Goal: Pain level will decrease  Description: Pain level will decrease  Outcome: Met This Shift     Problem: Skin Integrity:  Goal: Will show no infection signs and symptoms  Description: Will show no infection signs and symptoms  Outcome: Ongoing     Problem: Discharge Planning:  Goal: Discharged to appropriate level of care  Description: Discharged to appropriate level of care  Outcome: Ongoing     Problem:  Activity Intolerance:  Goal: Ability to tolerate increased activity will improve  Description: Ability to tolerate increased activity will improve  Outcome: Ongoing     Problem: Airway Clearance - Ineffective:  Goal: Ability to maintain a clear airway will improve  Description: Ability to maintain a clear airway will improve  Outcome: Met This Shift     Problem: Gas Exchange - Impaired:  Goal: Levels of oxygenation will improve  Description: Levels of oxygenation will improve  Outcome: Ongoing     Problem: Nutrition  Goal: Optimal nutrition therapy  Outcome: Ongoing     Problem: Infection - Methicillin-Resistant Staphylococcus Aureus Infection:  Goal: Absence of methicillin-resistant Staphylococcus aureus infection  Description: Absence of methicillin-resistant Staphylococcus aureus infection  Outcome: Ongoing     Problem: Mobility - Impaired:  Goal: Mobility will improve to maximum level  Description: Mobility will improve to maximum level  Outcome: Ongoing     Problem: Mobility - Impaired:  Goal: Mobility will improve to maximum level  Description: Mobility will improve to maximum level  Outcome: Ongoing     Problem: Skin Integrity - Impaired:  Goal: Will show no infection signs and symptoms  Description: Will show no infection signs and symptoms  Outcome: Ongoing     Problem: HEMODYNAMIC STATUS  Goal: Patient has stable vital signs and fluid balance  Outcome: Ongoing     Problem: FLUID AND ELECTROLYTE IMBALANCE  Goal: Fluid and electrolyte balance are achieved/maintained  Outcome: Ongoing

## 2021-07-14 NOTE — H&P
No  Other:         Family History: cardiovascular disease at an advanced age. Past History  Asthma No  Chronic Bronchitis Yes  Emphysema Yes  Tuberculosis No  Smokes Yes( pipe 70 years)  Head Injury No  Epilepsy No  Kidney Disease No  Liver Disease/Hepatitis No  Yellow Jaundice No  Diabetes No  Thyroid Disease No  Heart Disease Yes  High Blood Pressure Yes (previously)  Angina No  Rheumatic Fever No  Cortisone Rx No  Alcohol Excess No  Pregnancy No  Glaucoma No  Loose Teeth Yes and No  Tranquilizers No  Other: peripheral vascular disease. 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 Authorizing Provider   ibuprofen (ADVIL;MOTRIN) 200 MG tablet Take 400 mg by mouth every 6 hours as needed for Pain       Historical Provider, MD   magnesium 30 MG tablet Take 30 mg by mouth daily       Historical Provider, MD   POTASSIUM GLUCONATE PO Take by mouth       Historical Provider, MD   Albuterol Sulfate (PRO AIR HFA IN) Inhale into the lungs 2 puffs every 6 hours as needed.        Historical Provider, MD                    hydrOXYzine (VISTARIL) 50 MG capsule Take 50 mg by mouth 3 times daily as needed for Itching       Historical Provider, MD   furosemide (LASIX) 20 MG tablet Take 20 mg by mouth 2 times daily        Historical Provider, MD   tamsulosin (FLOMAX) 0.4 MG capsule Take 0.4 mg by mouth 2 times daily       Historical Provider, MD   lisinopril (PRINIVIL;ZESTRIL) 5 MG tablet Take 5 mg by mouth daily       Historical Provider, MD      ,   Current Hospital Medications Plavix   No current facility-administered medications for this encounter. Allergies: Patient has no known allergies. Previous Surgery: percutaneous cath with angioplasty left leg 6/18, t and a, herniorrhaphy green light prostatic surgery         Physical Examination     Constitutional: well nourished tired appearing     Neurological: oriented times three cn 2-12 intact  Conductive hearing deficit      Eyes: perrla eom intact conj pink non icteric      Lymphatic wnl      Neck/Ear/Nose/Throat: no carotid bruits no masses slight cervical kyphosis     Respiratory: increased ap diameter chest slow expiration no rales faint rhonchi both bases no wheezing,but occasional bronchitic cough. Cardiovascular: rrr faint MRM base hear radiating into left axilla     Abdomen/GI: soft nor masses no tenderness. normal bowel sounds. Musculoskeletal: right  lower leg swollen right leg much larger than left with extensive skin maceration and breakdown tissues weeping serum erythematous capillary filling less than two seconds. doppler signals biphasic dorsalis pedis and posterior tibialis and popliteal arteries. Palpable femoral pulses bilaterally. G/U female: na                          G/U male: grossly wnl male age appropriate rectal negative mild enlargement of prostate non tender resilient . Viscous stool in rectal vault brown hemoccult negative. Bruise right gluteus. Chest/Breasts: negative      Skin/Integumentary: solar keratosis and elastosis      Psychological: droll sense of humor      Other:               Conclusion:   Cellulitis  Right leg  with skin breakdown and dependent oedema copd, peripheral vascular disease pulmonary hypertension bronchitis, prostatic urinary outflow obstruction dehydration. Planned course of Action: admit to swing bed. antibiotics,  cardiac assistance                 The beneficiary may reasonably be expected to be discharged or transferred to a hospital within 96 hours after admission.       Estimated length of stay 96 plus hours            Time In:     Time Out: 2373        Toya Bosworth, MD

## 2021-07-14 NOTE — PROGRESS NOTES
Patient awake in bed. Vitals and reassesment as charted. Dressing change done at this time. Bilateral legs re-wrapped. Repositioned in bed. Call light in reach. Bed alarm on.

## 2021-07-14 NOTE — PROGRESS NOTES
Dot # 10              Subjective: Patient reports feeling pretty fair today. He denies worsening shortness of breath. He reports cramp in right lower quadrant and reports having experienced that in past years. He indicates area where he has an old inguinal surgical scar. Objective: chest slow expiration no rales other than faint basal rales on expiration. Card rrr carotids no bruits. Abdomen soft normal bowel sounds. No masses no peritoneal signs. Minimal ankle oedema. Dressing done by myself and wound care. Ulcerated areas in right ankle closing up .  marginal and fissured areas medial and laterally are reepithelialize. Cellulitis is now cleared from leg. I and o adequate and his weight is down to 228 and 8 oz. He is afebrile with stable vs. Latest cardiac echo reviewed. Study greatly appreciated. Assessment: acute on chronic diastolic CHF, acute on chronic renal  Disease ,anemia,  MRSA,serratia, proteus cellulitis chronic venous stasis disease of lower extremities,copd               Plan: Discharge and readmit to swing bed  Pt Ot   Continue iv diuresis. switch rocephin to IM. All of the patient's questions answered.       Destiny Bronson MD

## 2021-07-15 LAB
ANION GAP SERPL CALCULATED.3IONS-SCNC: 8 MMOL/L (ref 9–17)
BUN BLDV-MCNC: 41 MG/DL (ref 8–23)
BUN/CREAT BLD: 33 (ref 9–20)
CALCIUM SERPL-MCNC: 8.9 MG/DL (ref 8.6–10.4)
CHLORIDE BLD-SCNC: 109 MMOL/L (ref 98–107)
CO2: 26 MMOL/L (ref 20–31)
CREAT SERPL-MCNC: 1.26 MG/DL (ref 0.7–1.2)
GFR AFRICAN AMERICAN: >60 ML/MIN
GFR NON-AFRICAN AMERICAN: 54 ML/MIN
GFR SERPL CREATININE-BSD FRML MDRD: ABNORMAL ML/MIN/{1.73_M2}
GFR SERPL CREATININE-BSD FRML MDRD: ABNORMAL ML/MIN/{1.73_M2}
GLUCOSE BLD-MCNC: 98 MG/DL (ref 70–99)
POTASSIUM SERPL-SCNC: 3.7 MMOL/L (ref 3.7–5.3)
SODIUM BLD-SCNC: 143 MMOL/L (ref 135–144)

## 2021-07-15 PROCEDURE — 1200000002 HC SEMI PRIVATE SWING BED

## 2021-07-15 PROCEDURE — 94664 DEMO&/EVAL PT USE INHALER: CPT

## 2021-07-15 PROCEDURE — 80048 BASIC METABOLIC PNL TOTAL CA: CPT

## 2021-07-15 PROCEDURE — 97530 THERAPEUTIC ACTIVITIES: CPT

## 2021-07-15 PROCEDURE — 2500000003 HC RX 250 WO HCPCS: Performed by: SURGERY

## 2021-07-15 PROCEDURE — 97162 PT EVAL MOD COMPLEX 30 MIN: CPT

## 2021-07-15 PROCEDURE — 97535 SELF CARE MNGMENT TRAINING: CPT

## 2021-07-15 PROCEDURE — 99231 SBSQ HOSP IP/OBS SF/LOW 25: CPT | Performed by: INTERNAL MEDICINE

## 2021-07-15 PROCEDURE — 2580000003 HC RX 258: Performed by: SURGERY

## 2021-07-15 PROCEDURE — 6370000000 HC RX 637 (ALT 250 FOR IP): Performed by: SURGERY

## 2021-07-15 PROCEDURE — 97110 THERAPEUTIC EXERCISES: CPT

## 2021-07-15 PROCEDURE — 94640 AIRWAY INHALATION TREATMENT: CPT

## 2021-07-15 PROCEDURE — 6360000002 HC RX W HCPCS: Performed by: SURGERY

## 2021-07-15 PROCEDURE — 36415 COLL VENOUS BLD VENIPUNCTURE: CPT

## 2021-07-15 RX ADMIN — MIDODRINE HYDROCHLORIDE 5 MG: 5 TABLET ORAL at 18:06

## 2021-07-15 RX ADMIN — TAMSULOSIN HYDROCHLORIDE 0.4 MG: 0.4 CAPSULE ORAL at 21:30

## 2021-07-15 RX ADMIN — PREDNISONE 20 MG: 20 TABLET ORAL at 08:26

## 2021-07-15 RX ADMIN — SODIUM CHLORIDE, PRESERVATIVE FREE 10 ML: 5 INJECTION INTRAVENOUS at 08:36

## 2021-07-15 RX ADMIN — SODIUM CHLORIDE, PRESERVATIVE FREE 10 ML: 5 INJECTION INTRAVENOUS at 21:31

## 2021-07-15 RX ADMIN — CHOLECALCIFEROL TAB 25 MCG (1000 UNIT) 1000 UNITS: 25 TAB at 08:26

## 2021-07-15 RX ADMIN — ATORVASTATIN CALCIUM 40 MG: 40 TABLET, FILM COATED ORAL at 21:30

## 2021-07-15 RX ADMIN — Medication 1200 UNITS: at 08:25

## 2021-07-15 RX ADMIN — TRAMADOL HYDROCHLORIDE 50 MG: 50 TABLET, FILM COATED ORAL at 06:26

## 2021-07-15 RX ADMIN — PANTOPRAZOLE SODIUM 40 MG: 40 TABLET, DELAYED RELEASE ORAL at 06:19

## 2021-07-15 RX ADMIN — LIDOCAINE HYDROCHLORIDE 1000 MG: 10 INJECTION, SOLUTION EPIDURAL; INFILTRATION; INTRACAUDAL; PERINEURAL at 08:25

## 2021-07-15 RX ADMIN — TAMSULOSIN HYDROCHLORIDE 0.4 MG: 0.4 CAPSULE ORAL at 08:25

## 2021-07-15 RX ADMIN — TERBUTALINE SULFATE 2.5 MG: 2.5 TABLET ORAL at 14:51

## 2021-07-15 RX ADMIN — FUROSEMIDE 20 MG: 10 INJECTION, SOLUTION INTRAMUSCULAR; INTRAVENOUS at 18:06

## 2021-07-15 RX ADMIN — IPRATROPIUM BROMIDE AND ALBUTEROL SULFATE 1 AMPULE: .5; 3 SOLUTION RESPIRATORY (INHALATION) at 05:51

## 2021-07-15 RX ADMIN — IPRATROPIUM BROMIDE AND ALBUTEROL SULFATE 1 AMPULE: .5; 3 SOLUTION RESPIRATORY (INHALATION) at 20:33

## 2021-07-15 RX ADMIN — OXYCODONE HYDROCHLORIDE AND ACETAMINOPHEN 500 MG: 500 TABLET ORAL at 08:26

## 2021-07-15 RX ADMIN — GABAPENTIN 300 MG: 300 CAPSULE ORAL at 08:26

## 2021-07-15 RX ADMIN — TERBUTALINE SULFATE 2.5 MG: 2.5 TABLET ORAL at 18:06

## 2021-07-15 RX ADMIN — MELATONIN 6 MG: at 21:30

## 2021-07-15 RX ADMIN — CLOPIDOGREL BISULFATE 75 MG: 75 TABLET ORAL at 08:27

## 2021-07-15 RX ADMIN — Medication 1 CAPSULE: at 08:26

## 2021-07-15 RX ADMIN — Medication 1 CAPSULE: at 18:06

## 2021-07-15 RX ADMIN — MIDODRINE HYDROCHLORIDE 5 MG: 5 TABLET ORAL at 14:25

## 2021-07-15 RX ADMIN — FUROSEMIDE 20 MG: 10 INJECTION, SOLUTION INTRAMUSCULAR; INTRAVENOUS at 08:27

## 2021-07-15 RX ADMIN — SPIRONOLACTONE 12.5 MG: 25 TABLET, FILM COATED ORAL at 08:26

## 2021-07-15 RX ADMIN — DOXYCYCLINE HYCLATE 100 MG: 100 TABLET, COATED ORAL at 08:25

## 2021-07-15 RX ADMIN — GABAPENTIN 300 MG: 300 CAPSULE ORAL at 21:30

## 2021-07-15 RX ADMIN — DOXYCYCLINE HYCLATE 100 MG: 100 TABLET, COATED ORAL at 21:31

## 2021-07-15 RX ADMIN — TERBUTALINE SULFATE 2.5 MG: 2.5 TABLET ORAL at 23:59

## 2021-07-15 RX ADMIN — MIDODRINE HYDROCHLORIDE 5 MG: 5 TABLET ORAL at 08:26

## 2021-07-15 RX ADMIN — IPRATROPIUM BROMIDE AND ALBUTEROL SULFATE 1 AMPULE: .5; 3 SOLUTION RESPIRATORY (INHALATION) at 11:09

## 2021-07-15 RX ADMIN — TERBUTALINE SULFATE 2.5 MG: 2.5 TABLET ORAL at 06:19

## 2021-07-15 ASSESSMENT — PAIN SCALES - GENERAL
PAINLEVEL_OUTOF10: 4
PAINLEVEL_OUTOF10: 3
PAINLEVEL_OUTOF10: 4
PAINLEVEL_OUTOF10: 0

## 2021-07-15 ASSESSMENT — PAIN DESCRIPTION - DESCRIPTORS: DESCRIPTORS: SHOOTING

## 2021-07-15 ASSESSMENT — PAIN DESCRIPTION - LOCATION: LOCATION: LEG

## 2021-07-15 ASSESSMENT — PAIN DESCRIPTION - ONSET: ONSET: GRADUAL

## 2021-07-15 ASSESSMENT — PAIN DESCRIPTION - ORIENTATION: ORIENTATION: RIGHT

## 2021-07-15 ASSESSMENT — PAIN DESCRIPTION - FREQUENCY: FREQUENCY: INTERMITTENT

## 2021-07-15 ASSESSMENT — PAIN DESCRIPTION - PAIN TYPE: TYPE: ACUTE PAIN

## 2021-07-15 NOTE — PLAN OF CARE
Problem: Skin Integrity:  Goal: Will show no infection signs and symptoms  Description: Will show no infection signs and symptoms  7/15/2021 0226 by Tasia Stroud RN  Outcome: Ongoing     Problem: Falls - Risk of:  Goal: Will remain free from falls  Description: Will remain free from falls  Outcome: Met This Shift  Goal: Absence of physical injury  Description: Absence of physical injury  Outcome: Met This Shift     Problem: Discharge Planning:  Goal: Discharged to appropriate level of care  Description: Discharged to appropriate level of care  Outcome: Ongoing     Problem: Mobility - Impaired:  Goal: Mobility will improve to maximum level  Description: Mobility will improve to maximum level  Outcome: Ongoing     Problem: Pain:  Goal: Pain level will decrease  Description: Pain level will decrease  Outcome: Ongoing

## 2021-07-15 NOTE — PROGRESS NOTES
[facility]  Physical Therapy  Evaluation  Date: 7/15/2021  Patient Name: Shamar Noel        MRN: [id number]    : 1930  (80 y.o.)  Gender: male   Referring Practitioner: Dr Topher Alvarado  Diagnosis: Cellulitis  Additional Pertinent Hx: Pt admit 21 via ER due to severe pain B/L LEs R worse than L. Pt reported increasing pain, redness, drainage from right leg. He has a history of chronic lymphedema with frequent cellulitis of the legs. Last admitted for this in 2021   Past Medical History:   Diagnosis Date    Arthritis     COPD (chronic obstructive pulmonary disease) ([address]     Dependent edema     Hyperlipidemia     Hypertension     Psoriasis      Past Surgical History:   Procedure Laterality Date    ANGIOPLASTY Left 2018    Diana Kincaid -- leg    HERNIA REPAIR      NASAL POLYP SURGERY Bilateral     TONSILLECTOMY      TURP N/A 2019    CYSTOSCOPY TRANSURETHRAL RESECTION -PROSTATE LASER- PVP GREENLIGHT performed by Faisal Rogel MD at [address] [place] [address]       Restrictions  Restrictions/Precautions: General Precautions, Fall Risk      Subjective  Pain Level: 3    Orientation  Overall Orientation Status: Within Functional Limits    Home Living  Type of Home: House  Home Layout: One level  Entrance Stairs - Number of Steps: 3  Entrance Stairs - Rails: Both  Home Access: Stairs to enter with rails  Home Equipment: Rolling walker (Has cane but states it's an antique that he will not use)    Prior Level of Function  Additional Comments: Per report will do the cooking and can do ADLs; daughter assists with the cleaning. Prior Function  ADL Assistance: Independent  Homemaking Assistance: Independent  Ambulation Assistance: Independent  Transfer Assistance: Independent  Additional Comments: Per report will do the cooking and can do ADLs; daughter assists with the cleaning.     Objective  RUE AROM (degrees)  RUE AROM : WFL  LUE AROM (degrees)  LUE AROM : WFL  Strength RLE  Strength RLE: WNL  Comment: 4+/5  Strength LLE  Strength LLE: WNL  Comment: 4+/5     Scooting: Modified independent  Sit to Stand: Modified independent  Stand to sit: Modified independent     Ambulation 1  Surface: level tile  Device: No Device  Assistance: Modified Independent, Supervision  Quality of Gait: Pt with wide TOMAS but symetrical step length, continuous steps with turns, no LOB. Gait Deviations: Increased TOMAS  Distance: 110ft  Comments: Pt able to straighten chair linens on return to room without difficulty or LOB also. Balance  Sitting - Static: Good  Sitting - Dynamic: Good  Standing - Static: Good  Standing - Dynamic: Good    Assessment  Activity Tolerance: Patient Tolerated treatment well   Chart Reviewed: Yes  Assessment: Pt is at baseline at this time. Pt is able to independently transfer and amb without AD. Pt does have DME at home if needed, however at this time pt has no need for AD. Pt agreeable to D/C from PT due to being at baseline. Plan  D/C to home when medically cleared.   No PT needs at this time     Time In: 1120  Time Out: 1145  Timed Coded Minutes: 0  Total Treatment Time: 25     Ene Spears PT, PT 7/15/2021

## 2021-07-15 NOTE — PROGRESS NOTES
Pt. Resting in bed. Vitals and assessment as charted. Up to bathroom with standby assist then returned back to bed. Repositioned. Wound care done at this time. Legs re wrapped. Call light in reach. Bed alarm on.

## 2021-07-15 NOTE — PLAN OF CARE
[facility] Physical Therapy  Date: 7/15/2021  Patient Name: Pratima Joya        : 1930  (36 y.o.)  Referring Practitioner: Dr Jerry Reyes  Admission Date: 2021  Referral Date : 21  Diagnosis: Cellulitis     PT Orders Received and Evaluation Complete  Pt is at baseline at this time. Pt is able to independently transfer and amb without AD. Pt does have DME at home if needed, however at this time pt has no need for AD. Pt agreeable to D/C from PT due to being at baseline. Plan  Recommend D/C home when medically cleared, no PT needs at this time.       Jung Garcia, PT, PT   Date: 7/15/2021

## 2021-07-15 NOTE — PROGRESS NOTES
[facility]  Occupational Therapy  Daily Note  Date: 7/15/2021  Patient Name: Santiago Leung        MRN: [id number]    : 1930  (80 y.o.)    Subjective: Pt seated in chair upon arrival.  Pt is PROGRESSING toward goals and independence of Self Care this treatment session  Home Assist PRN    Objective     Balance  Sitting Balance: Modified independent   Standing Balance: Contact guard assistance       Sit to stand: Stand by assistance  Stand to sit: Stand by assistance       Assessment  Assessment: Pt tolerated session well this afternoon. Demos functional mobility to and from therapy room with no device and CGA from therapist. Completes ther ex with 2# dowel and tabletop UE bike with few rest breaks as needed. Returns to chair with call light in reach and personal alarm on. Prognosis: Good  Discharge Recommendations: Continue to assess pending progress, Home with assist PRN  Activity Tolerance: Patient Tolerated treatment well  Safety Devices in place: Yes          Exercises:  See Flowsheets    Goals  Short Term Goals  Time Frame for Short term goals: 3-5 days  Short term goal 1: Patient to tolerate 15 minutes ther-ex/ther-act to improve strength/endurance for ADLs. Short term goal 2: Patient to complete functional mobility with LRAD with SUP. Short term goal 3: Patient to complete UB/LB bathing/dressing with setup. Short term goal 4: Patient to tolerate >5 minutes dynamic standing during functional tasks with SUP.         Time In: 1325  Time Out: 1400  Timed Coded Minutes: 35  Total Treatment Time: Yasme[address], CAT/L    Date: 7/15/2021

## 2021-07-15 NOTE — PROGRESS NOTES
Cardiology    Down another 2 pounds with Creatinine continuing to improve. Will maintain the course watching the creatinine to guide therapy. May have to supplement low dose of potassium.     Wicho Ferrer

## 2021-07-15 NOTE — PROGRESS NOTES
Dot #11            Subjective: Patient reports feeling fairly well. He denies any chest pains and denies any shortness of breath. His bowels continue to move well and the stool is formining up. He asks about going home. Objective: chest slow expiration no rales no rhonchi. No wheezing this morning. Eating well. Sleeping sparingly. I do dressing change and wound care. All areas of ulceration nearly closed and cellulitis is gone. His weight is down to 226. Abdomen is soft with no masses and normal bowel sounds. Wounds cleansed and painted with betadine and sterile guaze applied and dressings held in place with plastic tape and ace wrapped. Assessment: acute on chronic diastolic CHF, acute on chronic renal  Disease ,anemia,  MRSA,serratia, proteus cellulitis chronic venous stasis disease of lower extremities,copd             Plan: All of the patients questions answered. Will give supplemental potassium. Continue diureses. Patient will want to go home tomorrow evening. Continue current wound care to right leg and ace wrap to both.        Ying Leon MD

## 2021-07-15 NOTE — PROGRESS NOTES
[facility]  Occupational Therapy  Daily Note  Date: 7/15/2021  Patient Name: Augustine Guthrie        MRN: [id number]    : 1930  (80 y.o.)    Subjective: Pt seated in chair upon arrival.  Pt is PROGRESSING toward goals and independence of Self Care this treatment session  Home Assist PRN    Objective  ADL    Balance  Sitting Balance: Modified independent   Standing Balance: Contact guard assistance       Sit to stand: Stand by assistance  Stand to sit: Stand by assistance       Assessment  Assessment: Pt in chair upon arrival. Tolerated treatment session well. Engages in UB strengthening exercise via 2# weight, 1x15, tolerates well. Requires rest breaks as needed due to SOB. Pt demos functional mobility in hallway and back to room ~75 feet and to/from BR with CGA from therapist, no LOB noted. RT in room for tx following OT session. Pt seated in chair with call light in reach and personal alarm on. Prognosis: Good  Discharge Recommendations: Continue to assess pending progress, Home with assist PRN  Activity Tolerance: Patient Tolerated treatment well  Safety Devices in place: Yes      Exercises:  See Flowsheets    Goals  Short Term Goals  Time Frame for Short term goals: 3-5 days  Short term goal 1: Patient to tolerate 15 minutes ther-ex/ther-act to improve strength/endurance for ADLs. Short term goal 2: Patient to complete functional mobility with LRAD with SUP. Short term goal 3: Patient to complete UB/LB bathing/dressing with setup. Short term goal 4: Patient to tolerate >5 minutes dynamic standing during functional tasks with SUP.         Time In: 1040  Time Out: 1110  Timed Coded Minutes: 30  Total Treatment Time: [address], CAT/L    Date: 7/15/2021

## 2021-07-15 NOTE — PROGRESS NOTES
Dr. Yarely Segura called in. Given update on patient including last vitals and I/O. New orders received and entered.

## 2021-07-15 NOTE — PROGRESS NOTES
Swing Bed packet presented to Patient this a.m. and explanation of contents provided. Patient familiar with Swing Bed process as he has received skilled care at [facility] previously. Patient in good spirits this a.m. States that he is working towards his goal of returning home with his daughter very soon. Patient signs acknowledgement for Swing Bed packet received. Voices no other concerns or questions at this time. LSW to assist with discharge planning as appropriate.     [address]  7/15/2021

## 2021-07-16 VITALS
BODY MASS INDEX: 38.77 KG/M2 | TEMPERATURE: 97.7 F | SYSTOLIC BLOOD PRESSURE: 128 MMHG | HEIGHT: 64 IN | DIASTOLIC BLOOD PRESSURE: 66 MMHG | RESPIRATION RATE: 18 BRPM | OXYGEN SATURATION: 99 % | HEART RATE: 73 BPM | WEIGHT: 227.1 LBS

## 2021-07-16 LAB
ANION GAP SERPL CALCULATED.3IONS-SCNC: 6 MMOL/L (ref 9–17)
BUN BLDV-MCNC: 44 MG/DL (ref 8–23)
BUN/CREAT BLD: 32 (ref 9–20)
CALCIUM SERPL-MCNC: 8.7 MG/DL (ref 8.6–10.4)
CHLORIDE BLD-SCNC: 107 MMOL/L (ref 98–107)
CO2: 28 MMOL/L (ref 20–31)
CREAT SERPL-MCNC: 1.39 MG/DL (ref 0.7–1.2)
GFR AFRICAN AMERICAN: 58 ML/MIN
GFR NON-AFRICAN AMERICAN: 48 ML/MIN
GFR SERPL CREATININE-BSD FRML MDRD: ABNORMAL ML/MIN/{1.73_M2}
GFR SERPL CREATININE-BSD FRML MDRD: ABNORMAL ML/MIN/{1.73_M2}
GLUCOSE BLD-MCNC: 107 MG/DL (ref 70–99)
POTASSIUM SERPL-SCNC: 3.7 MMOL/L (ref 3.7–5.3)
SODIUM BLD-SCNC: 141 MMOL/L (ref 135–144)

## 2021-07-16 PROCEDURE — 6360000002 HC RX W HCPCS: Performed by: SURGERY

## 2021-07-16 PROCEDURE — 2580000003 HC RX 258: Performed by: SURGERY

## 2021-07-16 PROCEDURE — 6370000000 HC RX 637 (ALT 250 FOR IP): Performed by: SURGERY

## 2021-07-16 PROCEDURE — 2500000003 HC RX 250 WO HCPCS: Performed by: SURGERY

## 2021-07-16 PROCEDURE — 80048 BASIC METABOLIC PNL TOTAL CA: CPT

## 2021-07-16 PROCEDURE — 36415 COLL VENOUS BLD VENIPUNCTURE: CPT

## 2021-07-16 PROCEDURE — 94640 AIRWAY INHALATION TREATMENT: CPT

## 2021-07-16 PROCEDURE — 97535 SELF CARE MNGMENT TRAINING: CPT

## 2021-07-16 RX ORDER — FUROSEMIDE 40 MG/1
120 TABLET ORAL DAILY
Qty: 60 TABLET | Refills: 3 | Status: SHIPPED | OUTPATIENT
Start: 2021-07-16 | End: 2022-07-16

## 2021-07-16 RX ORDER — TERBUTALINE SULFATE 2.5 MG/1
2.5 TABLET ORAL 3 TIMES DAILY
Qty: 120 TABLET | Refills: 3 | Status: SHIPPED | OUTPATIENT
Start: 2021-07-16

## 2021-07-16 RX ORDER — DOXYCYCLINE HYCLATE 100 MG
100 TABLET ORAL DAILY
Qty: 10 TABLET | Refills: 0 | Status: SHIPPED | OUTPATIENT
Start: 2021-07-16 | End: 2021-07-26

## 2021-07-16 RX ORDER — PANTOPRAZOLE SODIUM 40 MG/1
40 TABLET, DELAYED RELEASE ORAL
Qty: 30 TABLET | Refills: 3 | Status: SHIPPED | OUTPATIENT
Start: 2021-07-17

## 2021-07-16 RX ORDER — MIDODRINE HYDROCHLORIDE 5 MG/1
5 TABLET ORAL
Qty: 90 TABLET | Refills: 3 | Status: SHIPPED | OUTPATIENT
Start: 2021-07-16

## 2021-07-16 RX ORDER — SPIRONOLACTONE 25 MG/1
25 TABLET ORAL DAILY
Qty: 30 TABLET | Refills: 3 | Status: SHIPPED | OUTPATIENT
Start: 2021-07-16

## 2021-07-16 RX ORDER — TAMSULOSIN HYDROCHLORIDE 0.4 MG/1
0.4 CAPSULE ORAL DAILY
Qty: 30 CAPSULE | Refills: 3 | Status: SHIPPED | OUTPATIENT
Start: 2021-07-16

## 2021-07-16 RX ADMIN — SPIRONOLACTONE 12.5 MG: 25 TABLET, FILM COATED ORAL at 08:55

## 2021-07-16 RX ADMIN — TAMSULOSIN HYDROCHLORIDE 0.4 MG: 0.4 CAPSULE ORAL at 08:55

## 2021-07-16 RX ADMIN — DOXYCYCLINE HYCLATE 100 MG: 100 TABLET, COATED ORAL at 08:55

## 2021-07-16 RX ADMIN — OXYCODONE HYDROCHLORIDE AND ACETAMINOPHEN 500 MG: 500 TABLET ORAL at 08:54

## 2021-07-16 RX ADMIN — FUROSEMIDE 20 MG: 10 INJECTION, SOLUTION INTRAMUSCULAR; INTRAVENOUS at 17:13

## 2021-07-16 RX ADMIN — SODIUM CHLORIDE, PRESERVATIVE FREE 10 ML: 5 INJECTION INTRAVENOUS at 09:00

## 2021-07-16 RX ADMIN — MIDODRINE HYDROCHLORIDE 5 MG: 5 TABLET ORAL at 09:00

## 2021-07-16 RX ADMIN — Medication 1200 UNITS: at 08:55

## 2021-07-16 RX ADMIN — PANTOPRAZOLE SODIUM 40 MG: 40 TABLET, DELAYED RELEASE ORAL at 05:39

## 2021-07-16 RX ADMIN — CHOLECALCIFEROL TAB 25 MCG (1000 UNIT) 1000 UNITS: 25 TAB at 08:55

## 2021-07-16 RX ADMIN — IPRATROPIUM BROMIDE AND ALBUTEROL SULFATE 1 AMPULE: .5; 3 SOLUTION RESPIRATORY (INHALATION) at 05:32

## 2021-07-16 RX ADMIN — CLOPIDOGREL BISULFATE 75 MG: 75 TABLET ORAL at 08:55

## 2021-07-16 RX ADMIN — FUROSEMIDE 20 MG: 10 INJECTION, SOLUTION INTRAMUSCULAR; INTRAVENOUS at 08:53

## 2021-07-16 RX ADMIN — GABAPENTIN 300 MG: 300 CAPSULE ORAL at 08:54

## 2021-07-16 RX ADMIN — IPRATROPIUM BROMIDE AND ALBUTEROL SULFATE 1 AMPULE: .5; 3 SOLUTION RESPIRATORY (INHALATION) at 10:50

## 2021-07-16 RX ADMIN — IPRATROPIUM BROMIDE AND ALBUTEROL SULFATE 1 AMPULE: .5; 3 SOLUTION RESPIRATORY (INHALATION) at 16:16

## 2021-07-16 RX ADMIN — Medication 1 CAPSULE: at 08:55

## 2021-07-16 RX ADMIN — TERBUTALINE SULFATE 2.5 MG: 2.5 TABLET ORAL at 17:13

## 2021-07-16 RX ADMIN — MIDODRINE HYDROCHLORIDE 5 MG: 5 TABLET ORAL at 17:13

## 2021-07-16 RX ADMIN — Medication 1 CAPSULE: at 17:13

## 2021-07-16 RX ADMIN — MIDODRINE HYDROCHLORIDE 5 MG: 5 TABLET ORAL at 13:19

## 2021-07-16 RX ADMIN — FUROSEMIDE 20 MG: 20 TABLET ORAL at 08:55

## 2021-07-16 RX ADMIN — TERBUTALINE SULFATE 2.5 MG: 2.5 TABLET ORAL at 13:19

## 2021-07-16 RX ADMIN — TERBUTALINE SULFATE 2.5 MG: 2.5 TABLET ORAL at 05:39

## 2021-07-16 RX ADMIN — LIDOCAINE HYDROCHLORIDE 1000 MG: 10 INJECTION, SOLUTION EPIDURAL; INFILTRATION; INTRACAUDAL; PERINEURAL at 08:53

## 2021-07-16 ASSESSMENT — PAIN SCALES - GENERAL
PAINLEVEL_OUTOF10: 0
PAINLEVEL_OUTOF10: 0

## 2021-07-16 ASSESSMENT — PAIN SCALES - WONG BAKER: WONGBAKER_NUMERICALRESPONSE: 0

## 2021-07-16 NOTE — PROGRESS NOTES
[facility]  Occupational Therapy  Daily Note  Date: 2021  Patient Name: Ronna Farley        MRN: [id number]    : 1930  (80 y.o.)    Subjective: Pt seated in chair upon arrival.  Pt is PROGRESSING toward goals and independence of Self Care this treatment session  Home Assist PRN    Objective  ADL     UE Bathing: Supervision  LE Bathing: Supervision  UE Dressing: Setup (gown only)  LE Dressing: None (gown only)  Toileting: Supervision        Sit to stand: Stand by assistance  Stand to sit: Stand by assistance        Assessment  Assessment: Pt tolerated session well. Completes seated/standing ADLs as documented above. Pt able to stand and wash isadora area and bottom with no AD and no LOB noted. Returns to chair with call light in reach, denies further needs at this time. Pt d/c home later today. Prognosis: Good  Discharge Recommendations: Continue to assess pending progress, Home with assist PRN  Activity Tolerance: Patient Tolerated treatment well  Safety Devices in place: Yes       Exercises:  See Flowsheets    Goals  Short Term Goals  Time Frame for Short term goals: 3-5 days  Short term goal 1: Patient to tolerate 15 minutes ther-ex/ther-act to improve strength/endurance for ADLs. - MET  Short term goal 2: Patient to complete functional mobility with LRAD with SUP. - MET  Short term goal 3: Patient to complete UB/LB bathing/dressing with setup. - MET  Short term goal 4: Patient to tolerate >5 minutes dynamic standing during functional tasks with SUP.      Time In: 0950  Time Out: 1010  Timed Coded Minutes: 20  Total Treatment Time: [facility]/    Date: 2021

## 2021-07-16 NOTE — PROGRESS NOTES
Dot #12              Subjective: Patient reports feeling pretty  Well today. His bowels are still acting and he reports the bowels are forming up. He denies chest pains or worsening shortness of breath. Objective: Chest slow expiration nor rales no rhonchi no wheezing. Card occ premature beat. Carotids no bruits. Abdomen soft normal bowel sounds no peritoneal signs. Appetite good. I do wound care. Leg care provided and wounds are healing rapidly. Cellulitis is gone. Weight is down another two pound. He is 226. His potassium is holding but I will subsidize at 10 [place] q 3 days. I have discussed with his the importance of ace wraps and wound care and skin conditioning. I have informed him of his condition of chronic heart failure and the need for repeat ct chest in three months. I have emphasis ed the importance to him of follow-up with Dr. Tung Avitia and myself. I will see him this coming  Week on Wednesday and emphasized to him importance of daily weight and target of 220 lbs. I have explained to him the importance of his BP meds in keeping his blood pressure up and for his \"lung pills\". I have emphasized to him the importance of topical wound care and skin conditioning. Assessment: acute on chronic diastolic CHF, acute on chronic renal  Disease ,anemia,  MRSA,serratia, proteus cellulitis chronic venous stasis disease of lower extremities,copd              Plan: All of his questions answered. Plan is to discharge him after his 6 pm iv lasix dose.       Mariano Ba MD

## 2021-07-16 NOTE — PROGRESS NOTES
ALFONSO provided medicare notice of non coverage this date with discharge of skilled services on Sunday 7/18/2021 and discharge to home on Monday 7/19/2021. Pt and Dr spoke yesterday and Dr Deondre Del Real release pt to go home this date and pt would like to leave this date as well. Pt understands he may discharge on Monday, but he prefers to discharge today if Dr releases. Pt identifies no other discharge needs or concerns. Pt signs letter of non coverage and copy was made and placed in paper chart as well as provided to pt. ALFONSO called and spoke with pt's dtr regarding potential discharge today and dtr states that this was her understanding. Dtr states that they are ready for him to come home and he has no discharge needs or concerns that she anticipates. Pt doing well with therapies. Dtr plans to pick pt up tonmiriam.  Lance Denise MSW LSW 7/16/2021

## 2021-07-16 NOTE — PROGRESS NOTES
Comprehensive Nutrition Assessment    Type and Reason for Visit:  Initial (SBU)    Nutrition Recommendations/Plan:  Encourage protein and oral fluids    Nutrition Assessment:  Chronic moderate malnutrition r/t inadequate nutrient intakes, AEB chronic mild to moderate losses in temporal, orbitbal and buccal regions. Continues to take PO well and with ongoing wound healing needs of BLE. Renal indices are creeping up a little with <1000 ml oral fluids reported in I/O yesterday. May benefit from increased oral fluids. Malnutrition Assessment:  Malnutrition Status: Moderate malnutrition    Context:  Chronic Illness     Findings of the 6 clinical characteristics of malnutrition:  Energy Intake:  No significant decrease in energy intake  Weight Loss:  No significant weight loss     Body Fat Loss:  1 - Mild body fat loss Orbital, Buccal region   Muscle Mass Loss:  1 - Mild muscle mass loss Temples (temporalis)  Fluid Accumulation:  1 - Mild Extremities, Generalized   Strength:  Not Performed    Estimated Daily Nutrient Needs:  Energy (kcal):  4589-2381 (20-25); Weight Used for Energy Requirements:  Usual     Protein (g):   (1.5-1.8); Weight Used for Protein Requirements:  Ideal        Fluid (ml/day):  2000 minimally; Method Used for Fluid Requirements:  1 ml/kcal      Nutrition Related Findings:  1-2+ BLE and generalized edema, orbital fat losses, mild temporal losses      Wounds:  Multiple (bilateral lower leg wounds)       Current Nutrition Therapies:    ADULT DIET;  Regular    Anthropometric Measures:  · Height: 5' 4\" (162.6 cm)  · Current Body Weight: 227 lb 1.6 oz (103 kg)   · Admission Body Weight: 209 lb 6.4 oz (95 kg) (acutely)    · Usual Body Weight: 210 lb 12.8 oz (95.6 kg)     · Ideal Body Weight: 130 lbs; % Ideal Body Weight 174.7 %   · BMI: 39  · Adjusted Body Weight:  ; No Adjustment   · BMI Categories: Obese Class 2 (BMI 35.0 -39.9)       Nutrition Diagnosis:   · Moderate malnutrition related to inadequate protein-energy intake as evidenced by mild loss of subcutaneous fat, mild muscle loss, wounds    Lab Results   Component Value Date     07/16/2021    K 3.7 07/16/2021     07/16/2021    CO2 28 07/16/2021    BUN 44 (H) 07/16/2021    CREATININE 1.39 (H) 07/16/2021    GLUCOSE 107 (H) 07/16/2021    CALCIUM 8.7 07/16/2021    PROT 6.1 (L) 07/13/2021    LABALBU 3.0 (L) 07/13/2021    BILITOT 0.12 (L) 07/13/2021    ALKPHOS 60 07/13/2021    AST 27 07/13/2021    ALT 29 07/13/2021    LABGLOM 48 (L) 07/16/2021    GFRAA 58 (L) 07/16/2021    GLOB NOT REPORTED 10/29/2020     No results found for: LABA1C  No results found for: EAG  No results found for: VITD25    Nutrition Interventions:   Food and/or Nutrient Delivery:  Continue Current Diet  Nutrition Education/Counseling:  No recommendation at this time   Coordination of Nutrition Care:  Continue to monitor while inpatient    Goals:  PO >75% meals with increased protein and oral fluids       Nutrition Monitoring and Evaluation:   Behavioral-Environmental Outcomes:  Knowledge or Skill   Food/Nutrient Intake Outcomes:  Food and Nutrient Intake  Physical Signs/Symptoms Outcomes:  Biochemical Data, Fluid Status or Edema, Weight     Discharge Planning:    Continue current diet     Electronically signed by Tha Day RD, PATRICIA on 7/16/21 at 12:26 PM EDT    Contact: [phone]

## 2021-07-20 NOTE — DISCHARGE SUMMARY
[address], [facility]                               DISCHARGE SUMMARY    PATIENT NAME: Gertrude Lennon                   :        1930  MED REC NO:   [id number]                              ROOM:       9504  ACCOUNT NO:   [de-identified]                           ADMIT DATE: 2021  PROVIDER:     Marina Dunn [facility] DATE: 2021    ADMITTING DIAGNOSIS:  Cellulitis of the right lower extremity, L03.90. SECONDARY DIAGNOSIS AND COMPLICATIONS:  Past history of ischemia of the  right lower extremity requiring a catheter intervention, angioplasty and  stent placement. 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 PAST SURGICAL HISTORY:  Right inguinal hernia repair, past history of  angioplasty of the right lower extremity, past history of tonsillectomy,  past history of nasal polyp surgery, past history of transurethral  resection of prostate, laser GreenLight surgery 2019. CONSULTATIONS:  Inpatient hospitalist consultation, inpatient  consultation cardiology. SUMMARY:  This 44-year-old white male presents to the emergency room. He is weak. He has been falling. He has increased pain, swelling, and  infection of his right lower extremity extending from his tibial  tuberosity distally. He has two ulcerative lesions on the lower right  leg and culture of these wounds returned back a growth of Serratia  marcescens, drug-resistant Staph aureus as MRSA. The ulcerated areas  measure 9 cm x 7 cm and an additional one which is lateral at the  malleolus on the right is 3 cm x 5 cm.   The patient's zone of cellulitis  extends 33.5 cm and ends 7 cm above the medial malleolus. His weight on  admission is listed as 205 pounds, his known ideal dry body weight is  210 pounds. He is started on parenteral fluids, but the patient's  weight goes up to 232 pounds and 1.6 ounces. There is a sequential  correlation with the patient's creatinine decreasing from his  presentation where it eventually decreases to 1.26. The patient,  however, displays features of diastolic heart failure. After a  consultation with Internal Medicine and Cardiology, a decision is made  to accept a higher creatinine in exchange for better control of his  diastolic congestive features. The patient has a very heavy, very long  smoking history of two packs a day cigarettes and then a regular pipe  usage which he continues to the day of admission. The patient has  chronic bronchitis with this and so this is treated. The patient, who  also in dealing with his relative hypotension and his diastolic heart  features is continued on his clopidogrel. He is continued on his  atorvastatin and he is started on midodrine 5 mg on a t.i.d. basis. He  is continued on pantoprazole and he is continued on tamsulosin due to  his urinary outlet symptoms. His Lasix regimen is increased to 40 mg on  a three times a day basis and then the patient ultimately is started on  his ProAir inhaler and he is continued on his DuoNeb treatment. Antibiotics, the patient is started on Rocephin and tetracycline and  this controls his cellulitis as well as intensive four times a day wound  service. He has the cellulitis clear in his legs and has ulcerated  areas which are grade 2 ulcerations, begin to reepithelialize. The  patient requires some physical therapy and so, he is discharged to the  [place] [address] on 07/14/2021. He will be continued in the Swing Bed  Program to receive physical therapy, gait stabilization and diuresis.    He will be transitioned to oral antibiotics. He will have continuance  of his wound care regimen. We photographed his wounds every 2 days as  they have been treated and as they have responded.         CONOR Luke    D: 07/20/2021 13:40:18       T: 07/20/2021 13:47:39     [email or web address]  Job#: [id number]     Doc#: [id number]    CC:

## 2021-07-21 NOTE — DISCHARGE SUMMARY
[place] [address]                               DISCHARGE SUMMARY    PATIENT NAME: Yara Priest                   :        1930  MED REC NO:   [id number]                              ROOM:       1897  ACCOUNT NO:   [de-identified]                           ADMIT DATE: 2021  PROVIDER:     Razia Dunn [facility] DATE: 2021    Admission to swing bed status. ADMISSION DIAGNOSIS:  Cellulitis of the right lower leg with  ulcerations. FINAL DISCHARGE DIAGNOSIS:  Cellulitis, LO3.90 and cellulitis of the  right lower leg, LO3.119. SECONDARY DIAGNOSES AND COMPLICATIONS:  Cellulitis due to mixed  infection of right lower leg infection due to Staph aureus methicillin  resistant, Serratia marcescens and Proteus penneri, past medical history  of ischemic right lower extremity status post peripheral angioplasty and  stent placement, past history of urinary retention secondary to benign  prostatic hypertrophy requiring transurethral resection, chronic renal  insufficiency and acute diastolic heart failure, [place] class IV,  pulmonary hypertension, COPD, dependent edema, hyperlipidemia,  psoriasis, and arthritis. PAST SURGICAL HISTORY:  Angioplasty right lower leg with stent  placement, right inguinal herniorrhaphy, nasal polyp surgery,  transurethral resection, tonsillectomy, cystoscopy with transurethral  resection, GreenLight therapy. CONSULTATIONS:  Consultation to cardiology, consultation to hospitalist.    SUMMARY:  This 80-year-old white male presents with renal insufficiency  and prerenal azotemia. He is given parenteral fluids and has  improvement of his creatinine to the level of 1.24. The patient's  weight, however, progressively rises to a point of needing diuresis and  consultation with cardiology and hospitalist services.   He was  ultimately diuresed down to 226 pounds. He leaves the hospital at 227  pounds. His cellulitis has cleared with parenteral antibiotics and topical wound care treatment plan to his right lower extremity  ulcerations. These have been photographed every 2 days and have  received 4 times a day wound service. He will be seen in the office of  Dr. Kelly Joe this following Wednesday, 07/21/2021. He will keep track  of his daily weights. We are diuresing in accordance with  recommendation of Cardiology based on ultrasound studies of his heart  and ejection fraction. These things have been reviewed with him and  reviewed with his daughter. CONOR Sands    D: 07/21/2021 12:29:50       T: 07/21/2021 15:25:20     [email or web address]  Job#: [id number]     Doc#: [id number]    CC:      Patient was discharged home.     Jayce Harmon MD  9/15/21

## 2021-08-24 ENCOUNTER — HOSPITAL ENCOUNTER (EMERGENCY)
Age: 86
Discharge: HOME | End: 2021-08-24
Attending: EMERGENCY MEDICINE
Payer: MEDICARE

## 2021-08-24 VITALS
BODY MASS INDEX: 36.73 KG/M2 | RESPIRATION RATE: 20 BRPM | TEMPERATURE: 97.7 F | DIASTOLIC BLOOD PRESSURE: 107 MMHG | OXYGEN SATURATION: 95 % | WEIGHT: 214 LBS | HEART RATE: 64 BPM | SYSTOLIC BLOOD PRESSURE: 148 MMHG
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
## 2021-08-24 DIAGNOSIS — I87.2 VENOUS STASIS DERMATITIS OF RIGHT LOWER EXTREMITY: ICD-10-CM

## 2021-08-24 LAB
ABSOLUTE EOS #: 0.2 K/UL (ref 0–0.4)
ABSOLUTE IMMATURE GRANULOCYTE: ABNORMAL K/UL (ref 0–0.3)
ABSOLUTE LYMPH #: 0.8 K/UL (ref 1–4.8)
ABSOLUTE MONO #: 0.6 K/UL (ref 0–1)
ANION GAP SERPL CALCULATED.3IONS-SCNC: 9 MMOL/L (ref 9–17)
BASOPHILS # BLD: 1 % (ref 0–2)
BASOPHILS ABSOLUTE: 0.1 K/UL (ref 0–0.2)
BUN BLDV-MCNC: 27 MG/DL (ref 8–23)
BUN/CREAT BLD: 14 (ref 9–20)
CALCIUM SERPL-MCNC: 10.1 MG/DL (ref 8.6–10.4)
CHLORIDE BLD-SCNC: 103 MMOL/L (ref 98–107)
CK MB: 6.3 NG/ML
CO2: 26 MMOL/L (ref 20–31)
CREAT SERPL-MCNC: 1.96 MG/DL (ref 0.7–1.2)
DIFFERENTIAL TYPE: YES
EOSINOPHILS RELATIVE PERCENT: 2 % (ref 0–5)
GFR AFRICAN AMERICAN: 39 ML/MIN
GFR NON-AFRICAN AMERICAN: 32 ML/MIN
GFR SERPL CREATININE-BSD FRML MDRD: ABNORMAL ML/MIN/{1.73_M2}
GFR SERPL CREATININE-BSD FRML MDRD: ABNORMAL ML/MIN/{1.73_M2}
GLUCOSE BLD-MCNC: 92 MG/DL (ref 70–99)
HCT VFR BLD CALC: 33.8 % (ref 41–53)
HEMOGLOBIN: 11.3 G/DL (ref 13.5–17.5)
IMMATURE GRANULOCYTES: ABNORMAL %
LYMPHOCYTES # BLD: 11 % (ref 13–44)
MCH RBC QN AUTO: 30.2 PG (ref 26–34)
MCHC RBC AUTO-ENTMCNC: 33.6 G/DL (ref 31–37)
MCV RBC AUTO: 90 FL (ref 80–100)
MONOCYTES # BLD: 8 % (ref 5–9)
NRBC AUTOMATED: ABNORMAL PER 100 WBC
PDW BLD-RTO: 13.9 % (ref 12.1–15.2)
PLATELET # BLD: 259 K/UL (ref 140–450)
PLATELET ESTIMATE: ABNORMAL
PMV BLD AUTO: ABNORMAL FL (ref 6–12)
POTASSIUM SERPL-SCNC: 3.9 MMOL/L (ref 3.7–5.3)
RBC # BLD: 3.75 M/UL (ref 4.5–5.9)
RBC # BLD: ABNORMAL 10*6/UL
SEG NEUTROPHILS: 78 % (ref 39–75)
SEGMENTED NEUTROPHILS ABSOLUTE COUNT: 5.9 K/UL (ref 2.1–6.5)
SODIUM BLD-SCNC: 138 MMOL/L (ref 135–144)
TOTAL CK: 234 U/L (ref 39–308)
WBC # BLD: 7.6 K/UL (ref 3.5–11)
WBC # BLD: ABNORMAL 10*3/UL

## 2021-08-24 PROCEDURE — 82553 CREATINE MB FRACTION: CPT

## 2021-08-24 PROCEDURE — 80048 BASIC METABOLIC PNL TOTAL CA: CPT

## 2021-08-24 PROCEDURE — 93005 ELECTROCARDIOGRAM TRACING: CPT | Performed by: EMERGENCY MEDICINE

## 2021-08-24 PROCEDURE — 2580000003 HC RX 258: Performed by: EMERGENCY MEDICINE

## 2021-08-24 PROCEDURE — 82550 ASSAY OF CK (CPK): CPT

## 2021-08-24 PROCEDURE — 99285 EMERGENCY DEPT VISIT HI MDM: CPT

## 2021-08-24 PROCEDURE — 85025 COMPLETE CBC W/AUTO DIFF WBC: CPT

## 2021-08-24 RX ORDER — 0.9 % SODIUM CHLORIDE 0.9 %
500 INTRAVENOUS SOLUTION INTRAVENOUS ONCE
Status: COMPLETED | OUTPATIENT
Start: 2021-08-24 | End: 2021-08-24

## 2021-08-24 RX ORDER — CEPHALEXIN 500 MG/1
500 CAPSULE ORAL 3 TIMES DAILY
Qty: 21 CAPSULE | Refills: 0 | Status: SHIPPED | OUTPATIENT
Start: 2021-08-24 | End: 2021-08-31

## 2021-08-24 RX ADMIN — SODIUM CHLORIDE 500 ML: 9 INJECTION, SOLUTION INTRAVENOUS at 12:50

## 2021-08-24 NOTE — ED PROVIDER NOTES
HPI:  8/24/21,   Time: 12:26 PM EDT         Husam Merida is a 80 y.o. male presenting to the ED for patient states he fell when bending over putting slippers away and family called for assistance with lifting and EMS personnel were concerned because patient had abnormal heart rhythm per auscultation, beginning just prior to arrival ago. Patient has no complaints and denies chest pain and has shortness of breath though with exertion only and that is chronic. Denies palpitations denies head injury    ROS:   Pertinent positives and negatives are stated within HPI, all other systems reviewed and are negative.  --------------------------------------------- PAST HISTORY ---------------------------------------------  Past Medical History:  has a past medical history of Arthritis, COPD (chronic obstructive pulmonary disease) ([facility] [address] Dependent edema, Hyperlipidemia, Hypertension, and Psoriasis. Past Surgical History:  has a past surgical history that includes hernia repair; Tonsillectomy; angioplasty (Left, 06/20/2018); Nasal polyp surgery (Bilateral); and TURP (N/A, 4/4/2019). Social History:  reports that he has been smoking pipe. He has a 140.00 pack-year smoking history. He has never used smokeless tobacco. He reports that he does not drink alcohol and does not use drugs. Family History: family history is not on file. The patients home medications have been reviewed. Allergies: Patient has no known allergies.     -------------------------------------------------- RESULTS -------------------------------------------------  All laboratory and radiology results have been personally reviewed by myself   LABS:  Results for orders placed or performed during the hospital encounter of 08/24/21   CBC Auto Differential   Result Value Ref Range    WBC 7.6 3.5 - 11.0 k/uL    RBC 3.75 (L) 4.5 - 5.9 m/uL    Hemoglobin 11.3 (L) 13.5 - 17.5 g/dL    Hematocrit 33.8 (L) 41 - 53 %    MCV 90.0 80 - 100 fL    MCH 30.2 26 - 34 pg    MCHC 33.6 31 - 37 g/dL    RDW 13.9 12.1 - 15.2 %    Platelets 695 568 - 874 k/uL    MPV NOT REPORTED 6.0 - 12.0 fL    NRBC Automated NOT REPORTED per 100 WBC    Differential Type YES     Seg Neutrophils 78 (H) 39 - 75 %    Lymphocytes 11 (L) 13 - 44 %    Monocytes 8 5 - 9 %    Eosinophils % 2 0 - 5 %    Basophils 1 0 - 2 %    Immature Granulocytes NOT REPORTED 0 %    Segs Absolute 5.90 2.1 - 6.5 k/uL    Absolute Lymph # 0.80 (L) 1.0 - 4.8 k/uL    Absolute Mono # 0.60 0.0 - 1.0 k/uL    Absolute Eos # 0.20 0.0 - 0.4 k/uL    Basophils Absolute 0.10 0.0 - 0.2 k/uL    Absolute Immature Granulocyte NOT REPORTED 0.00 - 0.30 k/uL    WBC Morphology NOT REPORTED     RBC Morphology NOT REPORTED     Platelet Estimate NOT REPORTED    Basic Metabolic Panel w/ Reflex to MG   Result Value Ref Range    Glucose 92 70 - 99 mg/dL    BUN 27 (H) 8 - 23 mg/dL    CREATININE 1.96 (H) 0.70 - 1.20 mg/dL    Bun/Cre Ratio 14 9 - 20    Calcium 10.1 8.6 - 10.4 mg/dL    Sodium 138 135 - 144 mmol/L    Potassium 3.9 3.7 - 5.3 mmol/L    Chloride 103 98 - 107 mmol/L    CO2 26 20 - 31 mmol/L    Anion Gap 9 9 - 17 mmol/L    GFR Non-African American 32 (L) >60 mL/min    GFR  39 (L) >60 mL/min    GFR Comment          GFR Staging NOT REPORTED    CK   Result Value Ref Range    Total  39 - 308 U/L   CK MB   Result Value Ref Range    CK-MB 6.3 <10.5 ng/mL   EKG 12 Lead   Result Value Ref Range    Ventricular Rate 62 BPM    Atrial Rate 62 BPM    P-R Interval 216 ms    QRS Duration 104 ms    Q-T Interval 452 ms    QTc Calculation (Bazett) 458 ms    P Axis 44 degrees    R Axis -27 degrees    T Axis 5 degrees       RADIOLOGY:  Interpreted by Radiologist.  No orders to display       ------------------------- NURSING NOTES AND VITALS REVIEWED ---------------------------   The nursing notes within the ED encounter and vital signs as below have been reviewed.    BP (!) 150/85   Pulse 71   Temp 97.7 °F (36.5 °C)   Resp 16   Wt 214 lb (97.1 kg)   SpO2 94%   BMI 36.73 kg/m²   Oxygen Saturation Interpretation: Normal      ---------------------------------------------------PHYSICAL EXAM--------------------------------------      Constitutional/General: Alert and oriented x3, well appearing, non toxic in NAD  Head: NC/AT  Eyes: PERRL, EOMI  Mouth: Oropharynx clear, handling secretions, no trismus  Neck: Supple, full ROM, no meningeal signs  Pulmonary: Lungs clear to auscultation bilaterally, no wheezes, rales, or rhonchi. Not in respiratory distress  Cardiovascular:  Regular rate and rhythm, no murmurs, gallops, or rubs. 2+ distal pulses  Abdomen: Soft, non tender, non distended,   Extremities: Moves all extremities x 4. Warm and well perfused  Skin: warm and dry without rash  Neurologic: GCS 15, no acute focal deficits  Psych: Normal Affect      ------------------------------ ED COURSE/MEDICAL DECISION MAKING----------------------  Medications   0.9 % sodium chloride bolus (500 mLs IntraVENous New Bag 8/24/21 1250)         Medical Decision Making:    Evaluation of suspected dysrhythmia    Counseling: The emergency provider has spoken with the patient and discussed todays results, in addition to providing specific details for the plan of care and counseling regarding the diagnosis and prognosis. Questions are answered at this time and they are agreeable with the plan.      --------------------------------- IMPRESSION AND DISPOSITION ---------------------------------    IMPRESSION  1.  Fall, initial encounter Stable       DISPOSITION  Disposition: Discharge to home  Patient condition is stable      EKG: Sinus rhythm with first-degree block with a rate of 62 bpm, LVH, no clear acute ischemic change or ectopy other than first-degree AV block    Addendum spoke with Dr. Sugey De La Rosa regarding placing an Daune Antoine boot on his right leg however we do not have Unna boots according to the nursing staff and patient will follow up with Dr. Sugey De La Rosa at 4 o'clock today        Selene Church MD  08/24/21 1319       Selene Church MD  08/24/21 6918

## 2021-08-25 LAB
EKG ATRIAL RATE: 62 BPM
EKG P AXIS: 44 DEGREES
EKG P-R INTERVAL: 216 MS
EKG Q-T INTERVAL: 452 MS
EKG QRS DURATION: 104 MS
EKG QTC CALCULATION (BAZETT): 458 MS
EKG R AXIS: -27 DEGREES
EKG T AXIS: 5 DEGREES
EKG VENTRICULAR RATE: 62 BPM

## 2021-08-25 PROCEDURE — 93010 ELECTROCARDIOGRAM REPORT: CPT | Performed by: INTERNAL MEDICINE

## 2023-12-29 NOTE — PROGRESS NOTES
Dr. Jenny Bergeron calls for update. Update given. Will be in shortly for wound care. Failed protocol

## 2024-05-07 NOTE — PLAN OF CARE
Problem: Adult Inpatient Plan of Care  Goal: Plan of Care Review  Outcome: Progressing  Goal: Patient-Specific Goal (Individualized)  Outcome: Progressing  Goal: Absence of Hospital-Acquired Illness or Injury  Outcome: Progressing  Goal: Optimal Comfort and Wellbeing  Outcome: Progressing  Goal: Readiness for Transition of Care  Outcome: Progressing     Problem: Infection  Goal: Absence of Infection Signs and Symptoms  Outcome: Progressing     Problem: Fall Injury Risk  Goal: Absence of Fall and Fall-Related Injury  Outcome: Progressing     Problem: Skin Injury Risk Increased  Goal: Skin Health and Integrity  Outcome: Progressing     Problem: Wound  Goal: Optimal Coping  Outcome: Progressing  Goal: Optimal Functional Ability  Outcome: Progressing  Goal: Absence of Infection Signs and Symptoms  Outcome: Progressing  Goal: Improved Oral Intake  Outcome: Progressing  Goal: Optimal Pain Control and Function  Outcome: Progressing  Goal: Skin Health and Integrity  Outcome: Progressing  Goal: Optimal Wound Healing  Outcome: Progressing     Problem: Acute Kidney Injury/Impairment  Goal: Fluid and Electrolyte Balance  Outcome: Progressing  Goal: Improved Oral Intake  Outcome: Progressing  Goal: Effective Renal Function  Outcome: Progressing      Problem: Falls - Risk of:  Goal: Will remain free from falls  Description: Will remain free from falls  Outcome: Ongoing     Problem: Pain:  Goal: Pain level will decrease  Description: Pain level will decrease  Outcome: Ongoing  Goal: Control of acute pain  Description: Control of acute pain  Outcome: Ongoing  Goal: Control of chronic pain  Description: Control of chronic pain  Outcome: Ongoing

## (undated) DEVICE — Device

## (undated) DEVICE — SOLUTION IRRIG 1500ML STRL H2O USP POUR PLAS BTL

## (undated) DEVICE — DOVER HYDROGEL COATED LATEX FOLEY CATHETER, 30 ML, 3-WAY 22 FR/CH (7.3 MM): Brand: DOVER

## (undated) DEVICE — STRAP,CATHETER,ELASTIC,HOOK&LOOP: Brand: MEDLINE

## (undated) DEVICE — LASER SURG W22XH58IN D36IN 475LB SLD STATE FREQ DOUBLED

## (undated) DEVICE — GOWN,AURORA,NON-REINFORCED,2XL: Brand: MEDLINE

## (undated) DEVICE — Z DISCONTINUED USE 2624853 GLOVE SURG SZ 75 L12IN THK91MIL BRN LTX FREE

## (undated) DEVICE — SOLUTION SURG PREP CHLORHEXIDINE GLUC 4% 8 OZ EXIDINE

## (undated) DEVICE — SET GRAV VENT NVENT CK VLV 3 NDL FREE PRT 10 GTT

## (undated) DEVICE — MEDI-VAC NON-CONDUCTIVE SUCTION TUBING 6MM X 6.1M (20 FT.) L: Brand: CARDINAL HEALTH

## (undated) DEVICE — SYRINGE CATH TIP 50ML

## (undated) DEVICE — BAG DRNGE 2000ML ROUNDED TEARDROP W/ ANTIREFLX CHMBR DISP

## (undated) DEVICE — 4-PORT MANIFOLD: Brand: NEPTUNE 2

## (undated) DEVICE — Z DISCONTINUED BY MEDLINE USE 2711682 TRAY SKIN PREP DRY W/ PREM GLV

## (undated) DEVICE — PLUG CATH F UNIV ENDCAP FOR OCCL DRNGE LUMN DISP

## (undated) DEVICE — SOLUTION IV 1000ML 0.9% SOD CHL PH 5 INJ USP VIAFLX PLAS

## (undated) DEVICE — Z INACTIVE USE 2660664 SOLUTION IRRIG 3000ML 0.9% SOD CHL USP UROMATIC PLAS CONT

## (undated) DEVICE — DALE FOLEY CATHETER HOLDER, LEGBAND, FITS UP TO 30": Brand: DALE FOLEY CATHETER HOLDER

## (undated) DEVICE — SYRINGE,TOOMEY,IRRIGATION,70CC,STERILE: Brand: MEDLINE

## (undated) DEVICE — DRAINBAG,ANTI-REFLUX TOWER,L/F,2000ML,LL: Brand: MEDLINE